# Patient Record
Sex: MALE | Race: WHITE | NOT HISPANIC OR LATINO | URBAN - METROPOLITAN AREA
[De-identification: names, ages, dates, MRNs, and addresses within clinical notes are randomized per-mention and may not be internally consistent; named-entity substitution may affect disease eponyms.]

---

## 2017-01-04 PROBLEM — Z00.00 ENCOUNTER FOR PREVENTIVE HEALTH EXAMINATION: Status: ACTIVE | Noted: 2017-01-04

## 2017-01-14 ENCOUNTER — EMERGENCY (EMERGENCY)
Facility: HOSPITAL | Age: 22
LOS: 0 days | Discharge: HOME | End: 2017-01-14
Admitting: EMERGENCY MEDICINE
Payer: COMMERCIAL

## 2017-01-14 DIAGNOSIS — K92.1 MELENA: ICD-10-CM

## 2017-01-14 DIAGNOSIS — K62.5 HEMORRHAGE OF ANUS AND RECTUM: ICD-10-CM

## 2017-01-14 PROCEDURE — 99284 EMERGENCY DEPT VISIT MOD MDM: CPT

## 2017-02-02 ENCOUNTER — APPOINTMENT (OUTPATIENT)
Dept: PULMONOLOGY | Facility: CLINIC | Age: 22
End: 2017-02-02

## 2017-02-10 ENCOUNTER — INPATIENT (INPATIENT)
Facility: HOSPITAL | Age: 22
LOS: 2 days | Discharge: HOME | End: 2017-02-13
Attending: HOSPITALIST | Admitting: HOSPITALIST
Payer: COMMERCIAL

## 2017-02-10 VITALS — WEIGHT: 139.99 LBS | HEIGHT: 67 IN

## 2017-02-10 DIAGNOSIS — K52.9 NONINFECTIVE GASTROENTERITIS AND COLITIS, UNSPECIFIED: ICD-10-CM

## 2017-02-10 DIAGNOSIS — R10.9 UNSPECIFIED ABDOMINAL PAIN: ICD-10-CM

## 2017-02-10 DIAGNOSIS — R19.7 DIARRHEA, UNSPECIFIED: ICD-10-CM

## 2017-02-10 DIAGNOSIS — K51.211 ULCERATIVE (CHRONIC) PROCTITIS WITH RECTAL BLEEDING: ICD-10-CM

## 2017-02-10 DIAGNOSIS — R10.13 EPIGASTRIC PAIN: ICD-10-CM

## 2017-02-10 DIAGNOSIS — K29.70 GASTRITIS, UNSPECIFIED, WITHOUT BLEEDING: ICD-10-CM

## 2017-02-10 LAB
ALBUMIN SERPL ELPH-MCNC: 3.6 G/DL — SIGNIFICANT CHANGE UP (ref 3.3–5)
ALP SERPL-CCNC: 45 U/L — SIGNIFICANT CHANGE UP (ref 40–120)
ALT FLD-CCNC: 23 U/L — SIGNIFICANT CHANGE UP (ref 12–78)
ANION GAP SERPL CALC-SCNC: 4 MMOL/L — LOW (ref 5–17)
ANION GAP SERPL CALC-SCNC: 5 MMOL/L — SIGNIFICANT CHANGE UP (ref 5–17)
APTT BLD: 25.2 SEC — LOW (ref 27.5–37.4)
AST SERPL-CCNC: 8 U/L — LOW (ref 15–37)
BILIRUB SERPL-MCNC: 0.6 MG/DL — SIGNIFICANT CHANGE UP (ref 0.2–1.2)
BUN SERPL-MCNC: 11 MG/DL — SIGNIFICANT CHANGE UP (ref 7–23)
BUN SERPL-MCNC: 12 MG/DL — SIGNIFICANT CHANGE UP (ref 7–23)
CALCIUM SERPL-MCNC: 7.6 MG/DL — LOW (ref 8.5–10.1)
CALCIUM SERPL-MCNC: 8.6 MG/DL — SIGNIFICANT CHANGE UP (ref 8.5–10.1)
CHLORIDE SERPL-SCNC: 104 MMOL/L — SIGNIFICANT CHANGE UP (ref 96–108)
CHLORIDE SERPL-SCNC: 107 MMOL/L — SIGNIFICANT CHANGE UP (ref 96–108)
CO2 SERPL-SCNC: 30 MMOL/L — SIGNIFICANT CHANGE UP (ref 22–31)
CO2 SERPL-SCNC: 32 MMOL/L — HIGH (ref 22–31)
CREAT SERPL-MCNC: 0.86 MG/DL — SIGNIFICANT CHANGE UP (ref 0.5–1.3)
CREAT SERPL-MCNC: 0.94 MG/DL — SIGNIFICANT CHANGE UP (ref 0.5–1.3)
GLUCOSE SERPL-MCNC: 86 MG/DL — SIGNIFICANT CHANGE UP (ref 70–99)
GLUCOSE SERPL-MCNC: 92 MG/DL — SIGNIFICANT CHANGE UP (ref 70–99)
INR BLD: 1.1 RATIO — SIGNIFICANT CHANGE UP (ref 0.88–1.16)
POTASSIUM SERPL-MCNC: 3.7 MMOL/L — SIGNIFICANT CHANGE UP (ref 3.5–5.3)
POTASSIUM SERPL-MCNC: 3.9 MMOL/L — SIGNIFICANT CHANGE UP (ref 3.5–5.3)
POTASSIUM SERPL-SCNC: 3.7 MMOL/L — SIGNIFICANT CHANGE UP (ref 3.5–5.3)
POTASSIUM SERPL-SCNC: 3.9 MMOL/L — SIGNIFICANT CHANGE UP (ref 3.5–5.3)
PROT SERPL-MCNC: 6.9 GM/DL — SIGNIFICANT CHANGE UP (ref 6–8.3)
PROTHROM AB SERPL-ACNC: 12.1 SEC — SIGNIFICANT CHANGE UP (ref 10–13.1)
SODIUM SERPL-SCNC: 140 MMOL/L — SIGNIFICANT CHANGE UP (ref 135–145)
SODIUM SERPL-SCNC: 142 MMOL/L — SIGNIFICANT CHANGE UP (ref 135–145)

## 2017-02-10 PROCEDURE — 99285 EMERGENCY DEPT VISIT HI MDM: CPT

## 2017-02-10 PROCEDURE — 74177 CT ABD & PELVIS W/CONTRAST: CPT | Mod: 26

## 2017-02-10 RX ORDER — PROCHLORPERAZINE MALEATE 5 MG
10 TABLET ORAL ONCE
Qty: 0 | Refills: 0 | Status: COMPLETED | OUTPATIENT
Start: 2017-02-10 | End: 2017-02-10

## 2017-02-10 RX ORDER — HYDROMORPHONE HYDROCHLORIDE 2 MG/ML
0.5 INJECTION INTRAMUSCULAR; INTRAVENOUS; SUBCUTANEOUS ONCE
Qty: 0 | Refills: 0 | Status: DISCONTINUED | OUTPATIENT
Start: 2017-02-10 | End: 2017-02-10

## 2017-02-10 RX ORDER — DEXTROSE MONOHYDRATE, SODIUM CHLORIDE, AND POTASSIUM CHLORIDE 50; .745; 4.5 G/1000ML; G/1000ML; G/1000ML
1000 INJECTION, SOLUTION INTRAVENOUS
Qty: 0 | Refills: 0 | Status: DISCONTINUED | OUTPATIENT
Start: 2017-02-10 | End: 2017-02-13

## 2017-02-10 RX ORDER — CIPROFLOXACIN LACTATE 400MG/40ML
400 VIAL (ML) INTRAVENOUS EVERY 12 HOURS
Qty: 0 | Refills: 0 | Status: DISCONTINUED | OUTPATIENT
Start: 2017-02-11 | End: 2017-02-13

## 2017-02-10 RX ORDER — CIPROFLOXACIN LACTATE 400MG/40ML
400 VIAL (ML) INTRAVENOUS ONCE
Qty: 0 | Refills: 0 | Status: COMPLETED | OUTPATIENT
Start: 2017-02-10 | End: 2017-02-10

## 2017-02-10 RX ORDER — PANTOPRAZOLE SODIUM 20 MG/1
40 TABLET, DELAYED RELEASE ORAL
Qty: 0 | Refills: 0 | Status: DISCONTINUED | OUTPATIENT
Start: 2017-02-10 | End: 2017-02-13

## 2017-02-10 RX ORDER — MORPHINE SULFATE 50 MG/1
2 CAPSULE, EXTENDED RELEASE ORAL
Qty: 0 | Refills: 0 | Status: DISCONTINUED | OUTPATIENT
Start: 2017-02-10 | End: 2017-02-11

## 2017-02-10 RX ORDER — ONDANSETRON 8 MG/1
4 TABLET, FILM COATED ORAL EVERY 6 HOURS
Qty: 0 | Refills: 0 | Status: DISCONTINUED | OUTPATIENT
Start: 2017-02-10 | End: 2017-02-13

## 2017-02-10 RX ORDER — SODIUM CHLORIDE 9 MG/ML
1000 INJECTION INTRAMUSCULAR; INTRAVENOUS; SUBCUTANEOUS ONCE
Qty: 0 | Refills: 0 | Status: COMPLETED | OUTPATIENT
Start: 2017-02-10 | End: 2017-02-10

## 2017-02-10 RX ORDER — METRONIDAZOLE 500 MG
500 TABLET ORAL EVERY 8 HOURS
Qty: 0 | Refills: 0 | Status: DISCONTINUED | OUTPATIENT
Start: 2017-02-10 | End: 2017-02-13

## 2017-02-10 RX ORDER — ONDANSETRON 8 MG/1
4 TABLET, FILM COATED ORAL ONCE
Qty: 0 | Refills: 0 | Status: COMPLETED | OUTPATIENT
Start: 2017-02-10 | End: 2017-02-10

## 2017-02-10 RX ORDER — CIPROFLOXACIN LACTATE 400MG/40ML
VIAL (ML) INTRAVENOUS
Qty: 0 | Refills: 0 | Status: DISCONTINUED | OUTPATIENT
Start: 2017-02-10 | End: 2017-02-13

## 2017-02-10 RX ORDER — MORPHINE SULFATE 50 MG/1
4 CAPSULE, EXTENDED RELEASE ORAL ONCE
Qty: 0 | Refills: 0 | Status: DISCONTINUED | OUTPATIENT
Start: 2017-02-10 | End: 2017-02-10

## 2017-02-10 RX ADMIN — HYDROMORPHONE HYDROCHLORIDE 0.5 MILLIGRAM(S): 2 INJECTION INTRAMUSCULAR; INTRAVENOUS; SUBCUTANEOUS at 13:59

## 2017-02-10 RX ADMIN — PANTOPRAZOLE SODIUM 40 MILLIGRAM(S): 20 TABLET, DELAYED RELEASE ORAL at 19:23

## 2017-02-10 RX ADMIN — MORPHINE SULFATE 2 MILLIGRAM(S): 50 CAPSULE, EXTENDED RELEASE ORAL at 22:52

## 2017-02-10 RX ADMIN — MORPHINE SULFATE 4 MILLIGRAM(S): 50 CAPSULE, EXTENDED RELEASE ORAL at 12:28

## 2017-02-10 RX ADMIN — Medication 200 MILLIGRAM(S): at 22:31

## 2017-02-10 RX ADMIN — ONDANSETRON 4 MILLIGRAM(S): 8 TABLET, FILM COATED ORAL at 20:47

## 2017-02-10 RX ADMIN — ONDANSETRON 4 MILLIGRAM(S): 8 TABLET, FILM COATED ORAL at 12:29

## 2017-02-10 RX ADMIN — MORPHINE SULFATE 2 MILLIGRAM(S): 50 CAPSULE, EXTENDED RELEASE ORAL at 18:24

## 2017-02-10 RX ADMIN — Medication 10 MILLIGRAM(S): at 13:59

## 2017-02-10 RX ADMIN — MORPHINE SULFATE 2 MILLIGRAM(S): 50 CAPSULE, EXTENDED RELEASE ORAL at 18:40

## 2017-02-10 RX ADMIN — DEXTROSE MONOHYDRATE, SODIUM CHLORIDE, AND POTASSIUM CHLORIDE 75 MILLILITER(S): 50; .745; 4.5 INJECTION, SOLUTION INTRAVENOUS at 22:30

## 2017-02-10 RX ADMIN — MORPHINE SULFATE 4 MILLIGRAM(S): 50 CAPSULE, EXTENDED RELEASE ORAL at 13:59

## 2017-02-10 RX ADMIN — HYDROMORPHONE HYDROCHLORIDE 0.5 MILLIGRAM(S): 2 INJECTION INTRAMUSCULAR; INTRAVENOUS; SUBCUTANEOUS at 14:05

## 2017-02-10 RX ADMIN — SODIUM CHLORIDE 1000 MILLILITER(S): 9 INJECTION INTRAMUSCULAR; INTRAVENOUS; SUBCUTANEOUS at 15:44

## 2017-02-10 RX ADMIN — Medication 20 MILLIGRAM(S): at 15:36

## 2017-02-10 NOTE — CONSULT NOTE ADULT - PROBLEM SELECTOR RECOMMENDATION 2
check repeat cdiff  at increased risk due to colitis and steroids  iv fluids until taking signficant po

## 2017-02-10 NOTE — CONSULT NOTE ADULT - SUBJECTIVE AND OBJECTIVE BOX
Patient is a 21y old  Male who presents with a chief complaint of colitis, rectal bleeding, nausea with vomiting    HPI:  20yo male with hx nonspecific colitis (likely crohn's colitis) with recent flare responding slowly to oral prednisone now tapered to 10mg po bid.  Pt with some persistent rectal bleeding and nausea/vomiting last 2 days.  Also with intermittent crampy abd pain.  Pt with outpt cdiff postive for gdh antigen but negative toxin.      PAST MEDICAL & SURGICAL HISTORY:  colitis diagnosed this year - had responded to uceris in the past and now on prednisone 10 mg bid tapered from 40mg      MEDICATIONS  (STANDING):  see h and p and med rec  MEDICATIONS  (PRN):    SOCIAL HISTORY: occ marijuana, tob, etoh, ex ivdu    FAMILY HISTORY:  noncontributory    REVIEW OF SYSTEMS:    CONSTITUTIONAL: weakness  EYES/ENT: No visual changes;  No vertigo or throat pain   NECK: No pain or stiffness  RESPIRATORY: No cough, wheezing, hemoptysis; No shortness of breath  CARDIOVASCULAR: No chest pain or palpitations  GASTROINTESTINAL: abdominal pain, rectal bleeding  GENITOURINARY: No dysuria, frequency or hematuria  NEUROLOGICAL: No numbness or weakness  SKIN: No itching, burning, rashes, or lesions   PSYCH: Normal mood and affect. feels hyper from steroids  All other review of systems is negative unless indicated above.    Vital Signs Last 24 Hrs  T(C): 37, Max: 37 (02-10 @ 11:33)  T(F): 98.6, Max: 98.6 (02-10 @ 11:33)  HR: 94 (94 - 94)  BP: 121/109 (121/109 - 121/109)  BP(mean): --  RR: 18 (18 - 18)  SpO2: 100% (100% - 100%)    PHYSICAL EXAM:    Constitutional: NAD, well-developed  HEENT: MMM  Neck: No LAD  Respiratory: CTAB  Cardiovascular: S1 and S2, RRR, no M/G/R  Gastrointestinal: BS+, mildly tender lower abdomen  Extremities: No peripheral edema  Vascular: 2+ peripheral pulses  Neurological: A/O x 3, no focal deficits  Psychiatric: Normal mood, normal affect  Skin: No rashes    LABS:                        17.0   21.0  )-----------( 332      ( 10 Feb 2017 12:17 )             49.1     10 Feb 2017 12:17    140    |  104    |  12     ----------------------------<  92     3.9     |  32     |  0.94     Ca    8.6        10 Feb 2017 12:17    TPro  6.9    /  Alb  3.6    /  TBili  0.6    /  DBili  x      /  AST  8      /  ALT  23     /  AlkPhos  45     10 Feb 2017 12:17    PT/INR - ( 10 Feb 2017 12:17 )   PT: 12.1 sec;   INR: 1.10 ratio         PTT - ( 10 Feb 2017 12:17 )  PTT:25.2 sec  LIVER FUNCTIONS - ( 10 Feb 2017 12:17 )  Alb: 3.6 g/dL / Pro: 6.9 gm/dL / ALK PHOS: 45 U/L / ALT: 23 U/L / AST: 8 U/L / GGT: x             RADIOLOGY & ADDITIONAL STUDIES:

## 2017-02-10 NOTE — ED PROVIDER NOTE - OBJECTIVE STATEMENT
22 y/o M with a PMHx of UC in August presents to the ED c/o bright red blood per rectum for the past 2 months with NVD for the past few days with associated abd pain. Pt states that he had 8 episodes of NV this morning, spoke with GI who advised him to visit the ED for an evaluation. Pt currently uncomfortable, anxious and denies hematuria, HA, CP, SOB, fever, chills, cough or any other acute c/o at this time. [facility]. 22 y/o M with a PMHx of UC in August presents to the ED c/o bright red blood per rectum for the past 2 months with NVD for the past few days with associated intermittent abd pain. Pt states that he had 8 episodes of NV this morning, spoke with GI who advised him to visit the ED for an evaluation. Pt currently uncomfortable, anxious and denies hematuria, HA, CP, SOB, fever, chills, cough or any other acute c/o at this time. [facility]. 22 y/o M with a PMHx of UC in August presents to the ED c/o bright red blood per rectum for the past 2 months as well as NV with associated intermittent abd pain for the past few days. Pt states that he had 8 episodes of NV this morning, spoke with GI who advised him to visit the ED for an evaluation. Pt currently uncomfortable, anxious and denies hematuria, HA, CP, SOB, fever, chills, cough or any other acute c/o at this time. GI [facility].

## 2017-02-10 NOTE — H&P ADULT - NSHPPHYSICALEXAM_GEN_ALL_CORE
PHYSICAL EXAM:      Constitutional: comfortable    Eyes: no icterus,    HEENT- PERRLA    Neck:-supple    Back:-normal     Respiratory: air entry equal, no rales    Cardiovascular:S1s2 normal    Gastrointestinal: soft, bs+    Genitourinary: no palpable bladder    Extremities: no swelling noted      Neurological: CN-II-XII    Skin: no rash, ulcer    Lymph Nodes: no enlarged lymph node    Musculoskeletal: power intact in all muscle     Psychiatric: alert, oriented to time, place and persone

## 2017-02-10 NOTE — H&P ADULT - ASSESSMENT
#Abdominal pain with thickened gastric antrum in CT scan   -DDX- gastritis/GE  vs other etiology   -start emperically abx, C diff to follow, Dr. Ferrera evaluation to follow, clear liquid diet for now   -IV ppi    #Chronic colitis- due to ulcerative colitis- ct his dose of steroids     #dvt pr     #Above discussed with pt and all questions have been answered

## 2017-02-10 NOTE — ED PROVIDER NOTE - DETAILS:
I, Felipa Ugarte, performed the initial face to face bedside interview with this patient regarding history of present illness, review of symptoms and relevant past medical, social and family history.  I completed an independent physical examination.  I was the initial provider who evaluated this patient. The history, relevant review of systems, past medical and surgical history, medical decision making, and physical examination was documented by the scribe in my presence and I attest to the accuracy of the documentation.

## 2017-02-10 NOTE — ED STATDOCS - PROGRESS NOTE DETAILS
22 y/o male pmhx colitis presents to ED sent in by GI for work up. Possible c diff. Sent for likely admission. Pt sent to MAIN ER for further eval.

## 2017-02-10 NOTE — ED ADULT NURSE REASSESSMENT NOTE - NS ED NURSE REASSESS COMMENT FT1
pt complains of intermittent nausea, spoke with dr lynn about order for zofran which she will put in. report given to RN on 2S.

## 2017-02-10 NOTE — H&P ADULT - HISTORY OF PRESENT ILLNESS
20 y/o male was relatively alright till few days ago when he noticed pain in abdomen, located in epigastric area, crampy in nature, aggravated by food, relieved by itself, associated with bloody colored bowel movement(which is old for him). Non radiating.   -he has been diagnosed with ulcervative colitis and  was taking steroids prescribed by Dr. Ferrera which he was taking till this incidence happened

## 2017-02-10 NOTE — ED PROVIDER NOTE - MEDICAL DECISION MAKING DETAILS
Pt currently anxious presenting abd pain with plans to receive labs and imaging for further evaluation.

## 2017-02-10 NOTE — CONSULT NOTE ADULT - PROBLEM SELECTOR RECOMMENDATION 9
continue prednisone 10mg po bid for now  will continue taper likely as outpt in few days  no need for iv steroids at this time

## 2017-02-10 NOTE — H&P ADULT - NSHPLABSRESULTS_GEN_ALL_CORE
10 Feb 2017 12:17    140    |  104    |  12     ----------------------------<  92     3.9     |  32     |  0.94     Ca    8.6        10 Feb 2017 12:17    TPro  6.9    /  Alb  3.6    /  TBili  0.6    /  DBili  x      /  AST  8      /  ALT  23     /  AlkPhos  45     10 Feb 2017 12:17                          17.0   21.0  )-----------( 332      ( 10 Feb 2017 12:17 )             49.1

## 2017-02-10 NOTE — ED PROVIDER NOTE - CONSTITUTIONAL, MLM
normal... Ill appearing, well nourished, awake, alert, oriented to person, place, time/situation presenting mild distress. Well appearing, well nourished, awake, alert, oriented to person, place, time/situation presenting mild distress.

## 2017-02-11 LAB
ALBUMIN SERPL ELPH-MCNC: 2.6 G/DL — LOW (ref 3.3–5)
ANION GAP SERPL CALC-SCNC: 7 MMOL/L — SIGNIFICANT CHANGE UP (ref 5–17)
BUN SERPL-MCNC: 9 MG/DL — SIGNIFICANT CHANGE UP (ref 7–23)
CALCIUM SERPL-MCNC: 7.7 MG/DL — LOW (ref 8.5–10.1)
CHLORIDE SERPL-SCNC: 103 MMOL/L — SIGNIFICANT CHANGE UP (ref 96–108)
CO2 SERPL-SCNC: 31 MMOL/L — SIGNIFICANT CHANGE UP (ref 22–31)
CREAT SERPL-MCNC: 1.03 MG/DL — SIGNIFICANT CHANGE UP (ref 0.5–1.3)
GLUCOSE SERPL-MCNC: 108 MG/DL — HIGH (ref 70–99)
HCT VFR BLD CALC: 46.2 % — SIGNIFICANT CHANGE UP (ref 39–50)
HGB BLD-MCNC: 16.4 G/DL — SIGNIFICANT CHANGE UP (ref 13–17)
MAGNESIUM SERPL-MCNC: 1.7 MG/DL — LOW (ref 1.8–2.4)
MCHC RBC-ENTMCNC: 32.7 PG — SIGNIFICANT CHANGE UP (ref 27–34)
MCHC RBC-ENTMCNC: 35.5 GM/DL — SIGNIFICANT CHANGE UP (ref 32–36)
MCV RBC AUTO: 92.2 FL — SIGNIFICANT CHANGE UP (ref 80–100)
PHOSPHATE SERPL-MCNC: 2.5 MG/DL — SIGNIFICANT CHANGE UP (ref 2.5–4.5)
PLATELET # BLD AUTO: 262 K/UL — SIGNIFICANT CHANGE UP (ref 150–400)
POTASSIUM SERPL-MCNC: 3.5 MMOL/L — SIGNIFICANT CHANGE UP (ref 3.5–5.3)
POTASSIUM SERPL-SCNC: 3.5 MMOL/L — SIGNIFICANT CHANGE UP (ref 3.5–5.3)
RBC # BLD: 5.02 M/UL — SIGNIFICANT CHANGE UP (ref 4.2–5.8)
RBC # FLD: 11.1 % — SIGNIFICANT CHANGE UP (ref 10.3–14.5)
SODIUM SERPL-SCNC: 141 MMOL/L — SIGNIFICANT CHANGE UP (ref 135–145)
WBC # BLD: 19.2 K/UL — HIGH (ref 3.8–10.5)
WBC # FLD AUTO: 19.2 K/UL — HIGH (ref 3.8–10.5)

## 2017-02-11 RX ORDER — MORPHINE SULFATE 50 MG/1
2 CAPSULE, EXTENDED RELEASE ORAL
Qty: 0 | Refills: 0 | Status: DISCONTINUED | OUTPATIENT
Start: 2017-02-11 | End: 2017-02-13

## 2017-02-11 RX ORDER — HYDROMORPHONE HYDROCHLORIDE 2 MG/ML
0.5 INJECTION INTRAMUSCULAR; INTRAVENOUS; SUBCUTANEOUS
Qty: 0 | Refills: 0 | Status: DISCONTINUED | OUTPATIENT
Start: 2017-02-11 | End: 2017-02-13

## 2017-02-11 RX ORDER — DIPHENHYDRAMINE HCL 50 MG
12.5 CAPSULE ORAL ONCE
Qty: 0 | Refills: 0 | Status: COMPLETED | OUTPATIENT
Start: 2017-02-11 | End: 2017-02-11

## 2017-02-11 RX ORDER — METOCLOPRAMIDE HCL 10 MG
5 TABLET ORAL ONCE
Qty: 0 | Refills: 0 | Status: COMPLETED | OUTPATIENT
Start: 2017-02-11 | End: 2017-02-11

## 2017-02-11 RX ADMIN — Medication 12.5 MILLIGRAM(S): at 23:35

## 2017-02-11 RX ADMIN — Medication 200 MILLIGRAM(S): at 06:46

## 2017-02-11 RX ADMIN — MORPHINE SULFATE 2 MILLIGRAM(S): 50 CAPSULE, EXTENDED RELEASE ORAL at 11:25

## 2017-02-11 RX ADMIN — MORPHINE SULFATE 2 MILLIGRAM(S): 50 CAPSULE, EXTENDED RELEASE ORAL at 20:19

## 2017-02-11 RX ADMIN — Medication 5 MILLIGRAM(S): at 03:22

## 2017-02-11 RX ADMIN — Medication 200 MILLIGRAM(S): at 18:33

## 2017-02-11 RX ADMIN — Medication 20 MILLIGRAM(S): at 13:14

## 2017-02-11 RX ADMIN — Medication 20 MILLIGRAM(S): at 09:08

## 2017-02-11 RX ADMIN — PANTOPRAZOLE SODIUM 40 MILLIGRAM(S): 20 TABLET, DELAYED RELEASE ORAL at 06:48

## 2017-02-11 RX ADMIN — MORPHINE SULFATE 2 MILLIGRAM(S): 50 CAPSULE, EXTENDED RELEASE ORAL at 21:20

## 2017-02-11 RX ADMIN — ONDANSETRON 4 MILLIGRAM(S): 8 TABLET, FILM COATED ORAL at 10:51

## 2017-02-11 RX ADMIN — DEXTROSE MONOHYDRATE, SODIUM CHLORIDE, AND POTASSIUM CHLORIDE 75 MILLILITER(S): 50; .745; 4.5 INJECTION, SOLUTION INTRAVENOUS at 13:29

## 2017-02-11 RX ADMIN — ONDANSETRON 4 MILLIGRAM(S): 8 TABLET, FILM COATED ORAL at 05:36

## 2017-02-11 RX ADMIN — MORPHINE SULFATE 2 MILLIGRAM(S): 50 CAPSULE, EXTENDED RELEASE ORAL at 02:40

## 2017-02-11 RX ADMIN — PANTOPRAZOLE SODIUM 40 MILLIGRAM(S): 20 TABLET, DELAYED RELEASE ORAL at 19:39

## 2017-02-11 RX ADMIN — MORPHINE SULFATE 2 MILLIGRAM(S): 50 CAPSULE, EXTENDED RELEASE ORAL at 10:51

## 2017-02-11 NOTE — PROGRESS NOTE ADULT - ASSESSMENT
UC  cont tx    gastritis with G antral thickenning  PPI  will consider EGD  advance diet  anti emetics

## 2017-02-11 NOTE — PROGRESS NOTE ADULT - ASSESSMENT
A/P    #Abdominal pain with thickened gastric antrum in CT scan   -DDX- gastritis/GE  vs other etiology   -ct emperically abx, C diff to follow, Dr. Ferrera evaluation appreciated, clear liquid diet for now   -IV ppi to continue   -still having cramp in abdomen     #Chronic colitis- due to ulcerative colitis- in view of his nausea episode change po steroids to iv and monitor him     #dvt pr     #Above discussed with pt and all questions have been answered

## 2017-02-11 NOTE — PROGRESS NOTE ADULT - SUBJECTIVE AND OBJECTIVE BOX
Patient is a 21y old  Male who presents with a chief complaint of abdominal pain, nausea, vomiting, abdominal cramp (10 Feb 2017 17:07)      HPI:  22 y/o male was relatively alright till few days ago when he noticed pain in abdomen, located in epigastric area, crampy in nature, aggravated by food, relieved by itself, associated with bloody colored bowel movement(which is old for him). Non radiating.   -he has been diagnosed with ulcervative colitis and  was taking steroids prescribed by Dr. Cheung which he was taking till this incidence happened (10 Feb 2017 17:07)      n V improved and will try clears  feels that inc steroids helped him  mild upper abd pain, burning, neg radiation  dec diarrhea    PAST MEDICAL & SURGICAL HISTORY:  Ulcerative proctitis with rectal bleeding  No pertinent past medical history  No significant past surgical history  No significant past surgical history      MEDICATIONS  (STANDING):  ciprofloxacin   IVPB  IV Intermittent   metroNIDAZOLE    Tablet 500milliGRAM(s) Oral every 8 hours  dextrose 5% + sodium chloride 0.45% with potassium chloride 20 mEq/L 1000milliLiter(s) IV Continuous <Continuous>  pantoprazole  Injectable 40milliGRAM(s) IV Push two times a day  ciprofloxacin   IVPB 400milliGRAM(s) IV Intermittent every 12 hours  dicyclomine 20milliGRAM(s) Oral three times a day before meals  methylPREDNISolone sodium succinate Injectable 20milliGRAM(s) IV Push every 12 hours    MEDICATIONS  (PRN):  ondansetron Injectable 4milliGRAM(s) IV Push every 6 hours PRN Nausea and/or Vomiting  morphine  - Injectable 2milliGRAM(s) IV Push every 3 hours PRN Moderate Pain (4 - 6)  HYDROmorphone  Injectable 0.5milliGRAM(s) IV Push every 2 hours PRN Severe Pain (7 - 10)      Allergies    No Known Allergies    Intolerances        SOCIAL HISTORY:    FAMILY HISTORY:  No pertinent family history in first degree relatives      REVIEW OF SYSTEMS:    CONSTITUTIONAL: No weakness, fevers or chills  EYES/ENT: No visual changes;  No vertigo or throat pain   NECK: No pain or stiffness  RESPIRATORY: No cough, wheezing, hemoptysis; No shortness of breath  CARDIOVASCULAR: No chest pain or palpitations  GENITOURINARY: No dysuria, frequency or hematuria  NEUROLOGICAL: No numbness or weakness  SKIN: No itching, burning, rashes, or lesions   All other review of systems is negative unless indicated above.    Vital Signs Last 24 Hrs  T(C): 36.6, Max: 36.6 (02-10 @ 20:15)  T(F): 97.9, Max: 97.9 (02-11 @ 08:57)  HR: 97 (86 - 97)  BP: 120/67 (120/67 - 135/56)  BP(mean): --  RR: 18 (16 - 18)  SpO2: 100% (100% - 100%)    PHYSICAL EXAM:    Constitutional: NAD, well-developed  HEENT: EOMI, throat clear  Neck: No LAD, supple  Respiratory: CTA and P  Cardiovascular: S1 and S2, RRR, no M  Gastrointestinal: BS+, soft, NT/ND, neg HSM,  Extremities: No peripheral edema, neg clubing, cyanosis  Vascular: 2+ peripheral pulses  Neurological: A/O x 3, no focal deficits  Psychiatric: Normal mood, normal affect  Skin: No rashes    LABS:  CBC Full  -  ( 11 Feb 2017 11:05 )  WBC Count : 19.2 K/uL  Hemoglobin : 16.4 g/dL  Hematocrit : 46.2 %  Platelet Count - Automated : 262 K/uL  Mean Cell Volume : 92.2 fl  Mean Cell Hemoglobin : 32.7 pg  Mean Cell Hemoglobin Concentration : 35.5 gm/dL  Auto Neutrophil # : x  Auto Lymphocyte # : x  Auto Monocyte # : x  Auto Eosinophil # : x  Auto Basophil # : x  Auto Neutrophil % : x  Auto Lymphocyte % : x  Auto Monocyte % : x  Auto Eosinophil % : x  Auto Basophil % : x    11 Feb 2017 11:05    141    |  103    |  9      ----------------------------<  108    3.5     |  31     |  1.03     Ca    7.7        11 Feb 2017 11:05  Phos  2.5       11 Feb 2017 11:05  Mg     1.7       11 Feb 2017 11:05    TPro  x      /  Alb  2.6    /  TBili  x      /  DBili  x      /  AST  x      /  ALT  x      /  AlkPhos  x      11 Feb 2017 11:05    PT/INR - ( 10 Feb 2017 12:17 )   PT: 12.1 sec;   INR: 1.10 ratio         PTT - ( 10 Feb 2017 12:17 )  PTT:25.2 sec        RADIOLOGY & ADDITIONAL STUDIES:EXAM:  CT ABDOMEN AND PELVIS OC IC                            PROCEDURE DATE:  02/10/2017        INTERPRETATION:      CT Abdomen and Pelvis with IV contrast        Clinical Information:Abdominal pain  Exam Date: 2/10/2017 11:43 AM  Technique: CT ofthe abdomen and pelvis was performed following the   administration of oral and IV contrast          with no prior studies   available for comparison .    FINDINGS:    Visualized lung bases: within normal limits    Liver: within normal limits  Gallbladder: within normal limits.  Bile ducts: No intrahepatic or extrahepatic biliary dilatation  Pancreas: within normal limits    Spleen: within normal limits  Adrenal: within normal limits    Kidneys, Ureters and Bladder:: Kidneys enhance bilaterally and   symmetrically without hydronephrosis. No calculi, or renal masses. The   ureters and bladder are within normal limits.    Pelvis: No pelvic free fluid, mass or adenopathy.    Bowel: Moderate thickening of the gastric antrum with some mucosal wall  enhancement may represent gastritis however neoplastic etiology cannot be   excluded and upper endoscopy is recommended. Subcentimeter adjacent lymph   nodes. No bowel obstruction. Minimal thickening and shagginess to the   colon with prominence ofthe vasa recta particularly the rectosigmoid   colon and descending colon may represent chronic colitis and may be   indicative of a inflammatory bowel disease, clinically correlate. Normal   appendix visualized.    Peritoneum: No intra-abdominal free air, ascites or organized fluid   collections.    Retroperitoneum:     Subcentimeter retroperitoneal lymph nodes   non-specific in nature   Vessels:     Within normal limits.    Abdominal wall:  within normal limits  Bones:  Within normal limits.    IMPRESSION:    Moderate thickening of the gastric antrum with submucosal wall   enhancement compatible with gastritis, upper endoscopy is recommended.     Minimal thickening and shagginess to the colon with prominence of the   vasa recta particularlythe rectosigmoid colon and descending colon may   represent chronic colitis and may be indicative of a inflammatory bowel   disease, clinically correlate.     No bowel obstruction. Normal appendix visualized.              VITOR LANDIN M.D., ATTENDING RADIOLOGIST  This document has been electronically signed. Feb 10 2017  2:54PM

## 2017-02-11 NOTE — PROGRESS NOTE ADULT - SUBJECTIVE AND OBJECTIVE BOX
Patient is a 21y old  Male who presents with a chief complaint of abdominal pain, nausea, vomiting, abdominal cramp (10 Feb 2017 17:07)        HPI:  20 y/o male was relatively alright till few days ago when he noticed pain in abdomen, located in epigastric area, crampy in nature, aggravated by food, relieved by itself, associated with bloody colored bowel movement(which is old for him). Non radiating.   -he has been diagnosed with ulcervative colitis and  was taking steroids prescribed by Dr. Ferrera which he was taking till this incidence happened (10 Feb 2017 17:07)        PHYSICAL EXAM:  Vital Signs Last 24 Hrs  T(C): 36.6, Max: 37 (02-10 @ 11:33)  T(F): 97.8, Max: 98.6 (02-10 @ 11:33)  HR: 86 (78 - 94)  BP: 135/56 (115/74 - 135/56)  BP(mean): --  RR: 16 (16 - 18)  SpO2: 100% (99% - 100%)  GENERAL: comfortable   HEAD:  Atraumatic, Normocephalic  EYES: EOMI, PERRLA, conjunctiva and sclera clear  HEENT: Moist mucous membranes  NECK: Supple, No JVD  NERVOUS SYSTEM:  Alert & Oriented X3, Motor Strength 5/5 B/L upper and lower extremities; DTRs 2+ intact and symmetric  CHEST/LUNG: Clear to auscultation bilaterally; No rales, rhonchi, wheezing, or rubs  HEART: Regular rate and rhythm; No murmurs, rubs, or gallops  ABDOMEN: Soft, Nontender, Nondistended; Bowel sounds present  GENITOURINARY- Voiding, no palpable bladder  EXTREMITIES:  2+ Peripheral Pulses, No clubbing, cyanosis, or edema  MUSCULOSKELTAL- No muscle tenderness, Muscle tone normal, No joint tenderness, no Joint swelling, Joint range of motion-normal  SKIN-no rash, no lesion

## 2017-02-12 LAB
ALBUMIN SERPL ELPH-MCNC: 2.5 G/DL — LOW (ref 3.3–5)
ANION GAP SERPL CALC-SCNC: 8 MMOL/L — SIGNIFICANT CHANGE UP (ref 5–17)
BUN SERPL-MCNC: 7 MG/DL — SIGNIFICANT CHANGE UP (ref 7–23)
C DIFF BY PCR RESULT: SIGNIFICANT CHANGE UP
C DIFF TOX GENS STL QL NAA+PROBE: SIGNIFICANT CHANGE UP
CALCIUM SERPL-MCNC: 8 MG/DL — LOW (ref 8.5–10.1)
CHLORIDE SERPL-SCNC: 104 MMOL/L — SIGNIFICANT CHANGE UP (ref 96–108)
CO2 SERPL-SCNC: 30 MMOL/L — SIGNIFICANT CHANGE UP (ref 22–31)
CREAT SERPL-MCNC: 0.91 MG/DL — SIGNIFICANT CHANGE UP (ref 0.5–1.3)
GLUCOSE SERPL-MCNC: 110 MG/DL — HIGH (ref 70–99)
HCT VFR BLD CALC: 43.8 % — SIGNIFICANT CHANGE UP (ref 39–50)
HGB BLD-MCNC: 15.4 G/DL — SIGNIFICANT CHANGE UP (ref 13–17)
MAGNESIUM SERPL-MCNC: 2 MG/DL — SIGNIFICANT CHANGE UP (ref 1.8–2.4)
MCHC RBC-ENTMCNC: 32.7 PG — SIGNIFICANT CHANGE UP (ref 27–34)
MCHC RBC-ENTMCNC: 35.2 GM/DL — SIGNIFICANT CHANGE UP (ref 32–36)
MCV RBC AUTO: 93 FL — SIGNIFICANT CHANGE UP (ref 80–100)
PHOSPHATE SERPL-MCNC: 3.5 MG/DL — SIGNIFICANT CHANGE UP (ref 2.5–4.5)
PLATELET # BLD AUTO: 275 K/UL — SIGNIFICANT CHANGE UP (ref 150–400)
POTASSIUM SERPL-MCNC: 3.6 MMOL/L — SIGNIFICANT CHANGE UP (ref 3.5–5.3)
POTASSIUM SERPL-SCNC: 3.6 MMOL/L — SIGNIFICANT CHANGE UP (ref 3.5–5.3)
RBC # BLD: 4.7 M/UL — SIGNIFICANT CHANGE UP (ref 4.2–5.8)
RBC # FLD: 11.2 % — SIGNIFICANT CHANGE UP (ref 10.3–14.5)
SODIUM SERPL-SCNC: 142 MMOL/L — SIGNIFICANT CHANGE UP (ref 135–145)
WBC # BLD: 18.1 K/UL — HIGH (ref 3.8–10.5)
WBC # FLD AUTO: 18.1 K/UL — HIGH (ref 3.8–10.5)

## 2017-02-12 RX ORDER — ALPRAZOLAM 0.25 MG
0.25 TABLET ORAL THREE TIMES A DAY
Qty: 0 | Refills: 0 | Status: DISCONTINUED | OUTPATIENT
Start: 2017-02-12 | End: 2017-02-13

## 2017-02-12 RX ADMIN — ONDANSETRON 4 MILLIGRAM(S): 8 TABLET, FILM COATED ORAL at 14:27

## 2017-02-12 RX ADMIN — Medication 20 MILLIGRAM(S): at 05:53

## 2017-02-12 RX ADMIN — DEXTROSE MONOHYDRATE, SODIUM CHLORIDE, AND POTASSIUM CHLORIDE 75 MILLILITER(S): 50; .745; 4.5 INJECTION, SOLUTION INTRAVENOUS at 18:17

## 2017-02-12 RX ADMIN — MORPHINE SULFATE 2 MILLIGRAM(S): 50 CAPSULE, EXTENDED RELEASE ORAL at 02:30

## 2017-02-12 RX ADMIN — Medication 200 MILLIGRAM(S): at 05:52

## 2017-02-12 RX ADMIN — Medication 0.5 MILLIGRAM(S): at 21:54

## 2017-02-12 RX ADMIN — PANTOPRAZOLE SODIUM 40 MILLIGRAM(S): 20 TABLET, DELAYED RELEASE ORAL at 18:18

## 2017-02-12 RX ADMIN — MORPHINE SULFATE 2 MILLIGRAM(S): 50 CAPSULE, EXTENDED RELEASE ORAL at 01:29

## 2017-02-12 RX ADMIN — Medication 200 MILLIGRAM(S): at 18:17

## 2017-02-12 RX ADMIN — PANTOPRAZOLE SODIUM 40 MILLIGRAM(S): 20 TABLET, DELAYED RELEASE ORAL at 05:53

## 2017-02-12 RX ADMIN — Medication 20 MILLIGRAM(S): at 18:26

## 2017-02-12 NOTE — PROGRESS NOTE ADULT - SUBJECTIVE AND OBJECTIVE BOX
Patient is a 21y old  Male who presents with a chief complaint of abdominal pain, nausea, vomiting, abdominal cramp (10 Feb 2017 17:07)        HPI:  22 y/o male was relatively alright till few days ago when he noticed pain in abdomen, located in epigastric area, crampy in nature, aggravated by food, relieved by itself, associated with bloody colored bowel movement(which is old for him). Non radiating.   -he has been diagnosed with ulcervative colitis and  was taking steroids prescribed by Dr. Ferrera which he was taking till this incidence happened (10 Feb 2017 17:07)        PHYSICAL EXAM:    Vital Signs Last 24 Hrs  T(C): 36.3, Max: 36.4 (02-11 @ 20:34)  T(F): 97.4, Max: 97.6 (02-11 @ 20:34)  HR: 79 (79 - 80)  BP: 128/47 (109/52 - 128/47)  BP(mean): --  RR: 18 (18 - 18)  SpO2: 96% (96% - 100%)    HEAD:  Atraumatic, Normocephalic  EYES: EOMI, PERRLA, conjunctiva and sclera clear  HEENT: Moist mucous membranes  NECK: Supple, No JVD  NERVOUS SYSTEM:  Alert & Oriented X3, Motor Strength 5/5 B/L upper and lower extremities; DTRs 2+ intact and symmetric  CHEST/LUNG: Clear to auscultation bilaterally; No rales, rhonchi, wheezing, or rubs  HEART: Regular rate and rhythm; No murmurs, rubs, or gallops  ABDOMEN: Soft, Nontender, Nondistended; Bowel sounds present  GENITOURINARY- Voiding, no palpable bladder  EXTREMITIES:  2+ Peripheral Pulses, No clubbing, cyanosis, or edema  MUSCULOSKELETAL No muscle tenderness, Muscle tone normal, No joint tenderness, no Joint swelling, Joint range of motion-normal  SKIN-no rash, no lesion

## 2017-02-12 NOTE — PROGRESS NOTE ADULT - ASSESSMENT
A/P    #Abdominal pain with thickened gastric antrum in CT scan   -DDX- gastritis/GE  vs other etiology   -ct emperically abx, C diff negative, Dr. Ferrera evaluation appreciated,  -IV ppi to continue   -better today   -advance diet to thick liquid today     #Chronic colitis- due to ulcerative colitis- ct same rx      #dvt pr     #Above discussed with pt and all questions have been answered

## 2017-02-13 VITALS — RESPIRATION RATE: 17 BRPM | HEART RATE: 90 BPM | SYSTOLIC BLOOD PRESSURE: 143 MMHG | TEMPERATURE: 97 F

## 2017-02-13 LAB
CULTURE RESULTS: SIGNIFICANT CHANGE UP
SPECIMEN SOURCE: SIGNIFICANT CHANGE UP

## 2017-02-13 RX ORDER — PANTOPRAZOLE SODIUM 20 MG/1
1 TABLET, DELAYED RELEASE ORAL
Qty: 7 | Refills: 0 | OUTPATIENT
Start: 2017-02-13 | End: 2017-02-20

## 2017-02-13 RX ADMIN — MORPHINE SULFATE 2 MILLIGRAM(S): 50 CAPSULE, EXTENDED RELEASE ORAL at 04:35

## 2017-02-13 RX ADMIN — Medication 0.5 MILLIGRAM(S): at 09:00

## 2017-02-13 RX ADMIN — Medication 20 MILLIGRAM(S): at 16:56

## 2017-02-13 RX ADMIN — PANTOPRAZOLE SODIUM 40 MILLIGRAM(S): 20 TABLET, DELAYED RELEASE ORAL at 05:45

## 2017-02-13 RX ADMIN — MORPHINE SULFATE 2 MILLIGRAM(S): 50 CAPSULE, EXTENDED RELEASE ORAL at 00:04

## 2017-02-13 RX ADMIN — PANTOPRAZOLE SODIUM 40 MILLIGRAM(S): 20 TABLET, DELAYED RELEASE ORAL at 17:05

## 2017-02-13 RX ADMIN — Medication 20 MILLIGRAM(S): at 05:45

## 2017-02-13 RX ADMIN — Medication 200 MILLIGRAM(S): at 05:46

## 2017-02-13 RX ADMIN — MORPHINE SULFATE 2 MILLIGRAM(S): 50 CAPSULE, EXTENDED RELEASE ORAL at 13:00

## 2017-02-13 RX ADMIN — MORPHINE SULFATE 2 MILLIGRAM(S): 50 CAPSULE, EXTENDED RELEASE ORAL at 12:16

## 2017-02-13 NOTE — DISCHARGE NOTE ADULT - CARE PLAN
Principal Discharge DX:	Abdominal pain  Goal:	take your medicine, follow up in gi office tomorrow for further managemente  Instructions for follow-up, activity and diet:	as above

## 2017-02-13 NOTE — DISCHARGE NOTE ADULT - CARE PROVIDER_API CALL
Sidney Cheung), Gastroenterology; Internal Medicine  [address] [place] [address]  Phone: [phone]  Fax: [phone]

## 2017-02-13 NOTE — DISCHARGE NOTE ADULT - NS MD DC FALL RISK RISK
For information on Fall & Injury Prevention, visit [email or web address]

## 2017-02-13 NOTE — DISCHARGE NOTE ADULT - MEDICATION SUMMARY - MEDICATIONS TO TAKE
I will START or STAY ON the medications listed below when I get home from the hospital:    predniSONE 20 mg oral tablet  -- 20 milligram(s) by mouth 2 times a day  -- Indication: For Colitis    pantoprazole 40 mg oral granule, enteric coated  -- 1 each by mouth every 24 hours  -- It is very important that you take or use this exactly as directed.  Do not skip doses or discontinue unless directed by your doctor.  Obtain medical advice before taking any non-prescription drugs as some may affect the action of this medication.    -- Indication: For Gastritis

## 2017-02-13 NOTE — PROGRESS NOTE ADULT - ASSESSMENT
22 yo man with UC flare, clinically improved    -Continue PPI for gastritis. Can discuss EGD as outpt with Dr. Cheung  -Can DC home on prednisone 40 mg. Can taper as outpt  -Low residue diet  -Doesn't need to go home on abx.  -F/U with Dr. Cheung this week [phone]

## 2017-02-13 NOTE — PROGRESS NOTE ADULT - SUBJECTIVE AND OBJECTIVE BOX
HPI:  20 yo man with UC was admitted flare. Pain has resolved. BMs more formed. Minimal blood. Patient wants to advance diet and go home.      MEDICATIONS  (STANDING):  ciprofloxacin   IVPB  IV Intermittent   metroNIDAZOLE    Tablet 500milliGRAM(s) Oral every 8 hours  dextrose 5% + sodium chloride 0.45% with potassium chloride 20 mEq/L 1000milliLiter(s) IV Continuous <Continuous>  pantoprazole  Injectable 40milliGRAM(s) IV Push two times a day  ciprofloxacin   IVPB 400milliGRAM(s) IV Intermittent every 12 hours  dicyclomine 20milliGRAM(s) Oral three times a day before meals  methylPREDNISolone sodium succinate Injectable 20milliGRAM(s) IV Push every 12 hours    MEDICATIONS  (PRN):  ondansetron Injectable 4milliGRAM(s) IV Push every 6 hours PRN Nausea and/or Vomiting  morphine  - Injectable 2milliGRAM(s) IV Push every 3 hours PRN Moderate Pain (4 - 6)  HYDROmorphone  Injectable 0.5milliGRAM(s) IV Push every 2 hours PRN Severe Pain (7 - 10)  ALPRAZolam 0.25milliGRAM(s) Oral three times a day PRN anxiety      Allergies    No Known Allergies    Intolerances        REVIEW OF SYSTEMS    General: no unexpected weight loss    HEENT: no icterus    Respiratory and Thorax: no SOB  	  Cardiovascular: no CP    Gastrointestinal: as above    Skin: no jaundice      Vital Signs Last 24 Hrs  T(C): 36.4, Max: 36.6 (02-12 @ 15:17)  T(F): 97.6, Max: 97.9 (02-12 @ 15:17)  HR: 87 (84 - 87)  BP: 136/65 (113/47 - 136/65)  BP(mean): --  RR: 100 (17 - 100)  SpO2: 99% (99% - 99%)    PHYSICAL EXAM:    Constitutional: NAD    HEENT: anicteric    Respiratory: CTA BL    Cardiovascular:  RRR    Gastrointestinal: soft ND +BS NTTP    Extremities: no LE edema    Neuro: no focal deficits    Skin: no jaundice      LABS:                        15.4   18.1  )-----------( 275      ( 12 Feb 2017 07:03 )             43.8     12 Feb 2017 07:03    142    |  104    |  7      ----------------------------<  110    3.6     |  30     |  0.91     Ca    8.0        12 Feb 2017 07:03  Phos  3.5       12 Feb 2017 07:03  Mg     2.0       12 Feb 2017 07:03    TPro  x      /  Alb  2.5    /  TBili  x      /  DBili  x      /  AST  x      /  ALT  x      /  AlkPhos  x      12 Feb 2017 07:03      LIVER FUNCTIONS - ( 12 Feb 2017 07:03 )  Alb: 2.5 g/dL / Pro: x     / ALK PHOS: x     / ALT: x     / AST: x     / GGT: x             RADIOLOGY & ADDITIONAL STUDIES:

## 2017-02-13 NOTE — PROGRESS NOTE ADULT - SUBJECTIVE AND OBJECTIVE BOX
Patient is a 21y old  Male who presents with a chief complaint of abdominal pain, nausea, vomiting, abdominal cramp (10 Feb 2017 17:07)        HPI:  22 y/o male was relatively alright till few days ago when he noticed pain in abdomen, located in epigastric area, crampy in nature, aggravated by food, relieved by itself, associated with bloody colored bowel movement(which is old for him). Non radiating.   -he has been diagnosed with ulcerative colitis and  was taking steroids prescribed by Dr. Ferrera which he was taking till this incidence happened (10 Feb 2017 17:07)        PHYSICAL EXAM:                          15.4   18.1  )-----------( 275      ( 12 Feb 2017 07:03 )             43.8     12 Feb 2017 07:03    142    |  104    |  7      ----------------------------<  110    3.6     |  30     |  0.91     Ca    8.0        12 Feb 2017 07:03  Phos  3.5       12 Feb 2017 07:03  Mg     2.0       12 Feb 2017 07:03    TPro  x      /  Alb  2.5    /  TBili  x      /  DBili  x      /  AST  x      /  ALT  x      /  AlkPhos  x      12 Feb 2017 07:03    HEAD:  Atraumatic, Normocephalic  EYES: EOMI, PERRLA, conjunctiva and sclera clear  HEENT: Moist mucous membranes  NECK: Supple, No JVD  NERVOUS SYSTEM:  Alert & Oriented X3, Motor Strength 5/5 B/L upper and lower extremities; DTRs 2+ intact and symmetric  CHEST/LUNG: Clear to auscultation bilaterally; No rales, rhonchi, wheezing, or rubs  HEART: Regular rate and rhythm; No murmurs, rubs, or gallops  ABDOMEN: Soft, Nontender, Nondistended; Bowel sounds present  GENITOURINARY- Voiding, no palpable bladder  EXTREMITIES:  2+ Peripheral Pulses, No clubbing, cyanosis, or edema  MUSCULOSKELETAL No muscle tenderness, Muscle tone normal, No joint tenderness, no Joint swelling, Joint range of motion-normal  SKIN-no rash, no lesion      Assessment and Plan:       #Abdominal pain with thickened gastric antrum in CT scan   -possible gastritis with possible infectious colitis   -improved and feeling much better, tolerating regular diet       #Chronic colitis- due to ulcerative colitis- resume home meds       # GI follow up appreciated     #D/C today with further management as an outpt     #time spent more than 30 minutes

## 2017-02-13 NOTE — DISCHARGE NOTE ADULT - PATIENT PORTAL LINK FT
“You can access the [facility] Patient Portal, offered by [facility], by registering with the following website: [email or web address]”

## 2017-02-13 NOTE — DISCHARGE NOTE ADULT - MEDICATION SUMMARY - MEDICATIONS TO CHANGE
I will SWITCH the dose or number of times a day I take the medications listed below when I get home from the hospital:    Librax 5 mg-2.5 mg oral capsule  -- 2 cap(s) by mouth 4 times a day (before meals and at bedtime)    predniSONE 20 mg oral tablet  --  by mouth

## 2017-02-13 NOTE — DISCHARGE NOTE ADULT - HOSPITAL COURSE
pt admitted for abdominal pain and diarreha. Etiology appears to have gastritis and ulcerative colitis. He was given steroids, abx and ppi . With these measure pt has improved and currently feeling much better and he is being discharged with further management as an outpt    #time spent more than 30 minutes

## 2017-02-15 DIAGNOSIS — R10.9 UNSPECIFIED ABDOMINAL PAIN: ICD-10-CM

## 2017-02-15 DIAGNOSIS — K51.90 ULCERATIVE COLITIS, UNSPECIFIED, WITHOUT COMPLICATIONS: ICD-10-CM

## 2017-02-15 DIAGNOSIS — K29.70 GASTRITIS, UNSPECIFIED, WITHOUT BLEEDING: ICD-10-CM

## 2017-02-16 DIAGNOSIS — K51.818 OTHER ULCERATIVE COLITIS WITH OTHER COMPLICATION: ICD-10-CM

## 2017-02-16 LAB
CULTURE RESULTS: SIGNIFICANT CHANGE UP
CULTURE RESULTS: SIGNIFICANT CHANGE UP
SPECIMEN SOURCE: SIGNIFICANT CHANGE UP
SPECIMEN SOURCE: SIGNIFICANT CHANGE UP

## 2017-05-26 ENCOUNTER — RESULT REVIEW (OUTPATIENT)
Age: 22
End: 2017-05-26

## 2017-05-29 ENCOUNTER — INPATIENT (INPATIENT)
Facility: HOSPITAL | Age: 22
LOS: 0 days | Discharge: HOME | End: 2017-05-30
Attending: STUDENT IN AN ORGANIZED HEALTH CARE EDUCATION/TRAINING PROGRAM | Admitting: HOSPITALIST
Payer: COMMERCIAL

## 2017-05-29 VITALS — HEIGHT: 67 IN | WEIGHT: 126.99 LBS

## 2017-05-29 DIAGNOSIS — K51.90 ULCERATIVE COLITIS, UNSPECIFIED, WITHOUT COMPLICATIONS: ICD-10-CM

## 2017-05-29 PROBLEM — K51.211 ULCERATIVE (CHRONIC) PROCTITIS WITH RECTAL BLEEDING: Chronic | Status: ACTIVE | Noted: 2017-02-10

## 2017-05-29 LAB
ADD ON TEST-SPECIMEN IN LAB: SIGNIFICANT CHANGE UP
ALBUMIN SERPL ELPH-MCNC: 3.7 G/DL — SIGNIFICANT CHANGE UP (ref 3.3–5)
ALP SERPL-CCNC: 45 U/L — SIGNIFICANT CHANGE UP (ref 40–120)
ALT FLD-CCNC: 17 U/L — SIGNIFICANT CHANGE UP (ref 12–78)
ANION GAP SERPL CALC-SCNC: 7 MMOL/L — SIGNIFICANT CHANGE UP (ref 5–17)
AST SERPL-CCNC: 9 U/L — LOW (ref 15–37)
BASOPHILS # BLD AUTO: 0.1 K/UL — SIGNIFICANT CHANGE UP (ref 0–0.2)
BASOPHILS NFR BLD AUTO: 0.4 % — SIGNIFICANT CHANGE UP (ref 0–2)
BILIRUB SERPL-MCNC: 0.4 MG/DL — SIGNIFICANT CHANGE UP (ref 0.2–1.2)
BUN SERPL-MCNC: 11 MG/DL — SIGNIFICANT CHANGE UP (ref 7–23)
CALCIUM SERPL-MCNC: 8.8 MG/DL — SIGNIFICANT CHANGE UP (ref 8.5–10.1)
CHLORIDE SERPL-SCNC: 105 MMOL/L — SIGNIFICANT CHANGE UP (ref 96–108)
CO2 SERPL-SCNC: 28 MMOL/L — SIGNIFICANT CHANGE UP (ref 22–31)
CREAT SERPL-MCNC: 0.91 MG/DL — SIGNIFICANT CHANGE UP (ref 0.5–1.3)
EOSINOPHIL # BLD AUTO: 0.2 K/UL — SIGNIFICANT CHANGE UP (ref 0–0.5)
EOSINOPHIL NFR BLD AUTO: 1.4 % — SIGNIFICANT CHANGE UP (ref 0–6)
GLUCOSE SERPL-MCNC: 73 MG/DL — SIGNIFICANT CHANGE UP (ref 70–99)
HCT VFR BLD CALC: 42 % — SIGNIFICANT CHANGE UP (ref 39–50)
HGB BLD-MCNC: 14.1 G/DL — SIGNIFICANT CHANGE UP (ref 13–17)
LIDOCAIN IGE QN: 109 U/L — SIGNIFICANT CHANGE UP (ref 73–393)
LYMPHOCYTES # BLD AUTO: 25.6 % — SIGNIFICANT CHANGE UP (ref 13–44)
LYMPHOCYTES # BLD AUTO: 3.5 K/UL — HIGH (ref 1–3.3)
MAGNESIUM SERPL-MCNC: 2.3 MG/DL — SIGNIFICANT CHANGE UP (ref 1.6–2.6)
MCHC RBC-ENTMCNC: 28.3 PG — SIGNIFICANT CHANGE UP (ref 27–34)
MCHC RBC-ENTMCNC: 33.7 GM/DL — SIGNIFICANT CHANGE UP (ref 32–36)
MCV RBC AUTO: 84 FL — SIGNIFICANT CHANGE UP (ref 80–100)
MONOCYTES # BLD AUTO: 1.3 K/UL — HIGH (ref 0–0.9)
MONOCYTES NFR BLD AUTO: 9.6 % — SIGNIFICANT CHANGE UP (ref 2–14)
NEUTROPHILS # BLD AUTO: 8.7 K/UL — HIGH (ref 1.8–7.4)
NEUTROPHILS NFR BLD AUTO: 63 % — SIGNIFICANT CHANGE UP (ref 43–77)
PHOSPHATE SERPL-MCNC: 3.3 MG/DL — SIGNIFICANT CHANGE UP (ref 2.5–4.5)
PLATELET # BLD AUTO: 420 K/UL — HIGH (ref 150–400)
POTASSIUM SERPL-MCNC: 3.1 MMOL/L — LOW (ref 3.5–5.3)
POTASSIUM SERPL-SCNC: 3.1 MMOL/L — LOW (ref 3.5–5.3)
PROT SERPL-MCNC: 7.1 GM/DL — SIGNIFICANT CHANGE UP (ref 6–8.3)
RBC # BLD: 5 M/UL — SIGNIFICANT CHANGE UP (ref 4.2–5.8)
RBC # FLD: 12.3 % — SIGNIFICANT CHANGE UP (ref 10.3–14.5)
SODIUM SERPL-SCNC: 140 MMOL/L — SIGNIFICANT CHANGE UP (ref 135–145)
WBC # BLD: 13.9 K/UL — HIGH (ref 3.8–10.5)
WBC # FLD AUTO: 13.9 K/UL — HIGH (ref 3.8–10.5)

## 2017-05-29 PROCEDURE — 99285 EMERGENCY DEPT VISIT HI MDM: CPT

## 2017-05-29 RX ORDER — ONDANSETRON 8 MG/1
4 TABLET, FILM COATED ORAL ONCE
Qty: 0 | Refills: 0 | Status: COMPLETED | OUTPATIENT
Start: 2017-05-29 | End: 2017-05-29

## 2017-05-29 RX ORDER — MORPHINE SULFATE 50 MG/1
4 CAPSULE, EXTENDED RELEASE ORAL EVERY 4 HOURS
Qty: 0 | Refills: 0 | Status: DISCONTINUED | OUTPATIENT
Start: 2017-05-29 | End: 2017-05-30

## 2017-05-29 RX ORDER — ACETAMINOPHEN 500 MG
650 TABLET ORAL ONCE
Qty: 0 | Refills: 0 | Status: COMPLETED | OUTPATIENT
Start: 2017-05-29 | End: 2017-05-29

## 2017-05-29 RX ORDER — POTASSIUM CHLORIDE 20 MEQ
40 PACKET (EA) ORAL ONCE
Qty: 0 | Refills: 0 | Status: COMPLETED | OUTPATIENT
Start: 2017-05-29 | End: 2017-05-29

## 2017-05-29 RX ORDER — DIAZEPAM 5 MG
2 TABLET ORAL AT BEDTIME
Qty: 0 | Refills: 0 | Status: DISCONTINUED | OUTPATIENT
Start: 2017-05-29 | End: 2017-05-30

## 2017-05-29 RX ORDER — SODIUM CHLORIDE 9 MG/ML
1000 INJECTION INTRAMUSCULAR; INTRAVENOUS; SUBCUTANEOUS ONCE
Qty: 0 | Refills: 0 | Status: COMPLETED | OUTPATIENT
Start: 2017-05-29 | End: 2017-05-29

## 2017-05-29 RX ORDER — PANTOPRAZOLE SODIUM 20 MG/1
40 TABLET, DELAYED RELEASE ORAL
Qty: 0 | Refills: 0 | Status: DISCONTINUED | OUTPATIENT
Start: 2017-05-29 | End: 2017-05-30

## 2017-05-29 RX ORDER — MORPHINE SULFATE 50 MG/1
4 CAPSULE, EXTENDED RELEASE ORAL ONCE
Qty: 0 | Refills: 0 | Status: DISCONTINUED | OUTPATIENT
Start: 2017-05-29 | End: 2017-05-29

## 2017-05-29 RX ORDER — INFLIXIMAB-DYYB 120 MG/ML
290 INJECTION SUBCUTANEOUS ONCE
Qty: 0 | Refills: 0 | Status: DISCONTINUED | OUTPATIENT
Start: 2017-05-29 | End: 2017-05-30

## 2017-05-29 RX ORDER — SODIUM CHLORIDE 9 MG/ML
1000 INJECTION INTRAMUSCULAR; INTRAVENOUS; SUBCUTANEOUS
Qty: 0 | Refills: 0 | Status: DISCONTINUED | OUTPATIENT
Start: 2017-05-29 | End: 2017-05-30

## 2017-05-29 RX ORDER — DIPHENHYDRAMINE HCL 50 MG
25 CAPSULE ORAL ONCE
Qty: 0 | Refills: 0 | Status: COMPLETED | OUTPATIENT
Start: 2017-05-29 | End: 2017-05-29

## 2017-05-29 RX ADMIN — Medication 20 MILLIGRAM(S): at 14:41

## 2017-05-29 RX ADMIN — MORPHINE SULFATE 4 MILLIGRAM(S): 50 CAPSULE, EXTENDED RELEASE ORAL at 08:18

## 2017-05-29 RX ADMIN — Medication 40 MILLIEQUIVALENT(S): at 16:00

## 2017-05-29 RX ADMIN — Medication 20 MILLIGRAM(S): at 23:14

## 2017-05-29 RX ADMIN — Medication 0.25 MILLIGRAM(S): at 23:14

## 2017-05-29 RX ADMIN — Medication 20 MILLIGRAM(S): at 09:49

## 2017-05-29 RX ADMIN — SODIUM CHLORIDE 3000 MILLILITER(S): 9 INJECTION INTRAMUSCULAR; INTRAVENOUS; SUBCUTANEOUS at 09:49

## 2017-05-29 RX ADMIN — MORPHINE SULFATE 4 MILLIGRAM(S): 50 CAPSULE, EXTENDED RELEASE ORAL at 14:28

## 2017-05-29 RX ADMIN — MORPHINE SULFATE 4 MILLIGRAM(S): 50 CAPSULE, EXTENDED RELEASE ORAL at 17:15

## 2017-05-29 RX ADMIN — MORPHINE SULFATE 4 MILLIGRAM(S): 50 CAPSULE, EXTENDED RELEASE ORAL at 14:30

## 2017-05-29 RX ADMIN — SODIUM CHLORIDE 75 MILLILITER(S): 9 INJECTION INTRAMUSCULAR; INTRAVENOUS; SUBCUTANEOUS at 21:48

## 2017-05-29 RX ADMIN — ONDANSETRON 4 MILLIGRAM(S): 8 TABLET, FILM COATED ORAL at 09:49

## 2017-05-29 RX ADMIN — MORPHINE SULFATE 4 MILLIGRAM(S): 50 CAPSULE, EXTENDED RELEASE ORAL at 08:33

## 2017-05-29 NOTE — H&P ADULT - NSHPPHYSICALEXAM_GEN_ALL_CORE
PHYSICAL EXAM:    Constitutional: NAD, awake and alert, well-developed  HEENT: PERR, EOMI, Normal Hearing, MMM  Neck: Soft and supple, No LAD, No JVD  Respiratory: Breath sounds are clear bilaterally, No wheezing, rales or rhonchi  Cardiovascular: S1 and S2, regular rate and rhythm, no Murmurs, gallops or rubs  Gastrointestinal: Bowel sounds present, no rebounding or guarding.   Extremities: No peripheral edema  Vascular: 2+ peripheral pulses  Neurological: A/O x 3, no focal deficits

## 2017-05-29 NOTE — ED PROVIDER NOTE - PROGRESS NOTE DETAILS
Gayathri Mcconnell, on behalf of Attending, Dr. Park, Case discussed with Dr. Ferrera(GI) recommends admission for IV steroids and pain management as needed. Will order Remicade as requested.

## 2017-05-29 NOTE — H&P ADULT - NSHPREVIEWOFSYSTEMS_GEN_ALL_CORE
REVIEW OF SYSTEMS:    CONSTITUTIONAL: No weakness, fevers or chills  EYES/ENT: No visual changes;  No vertigo or throat pain   NECK: No pain or stiffness  RESPIRATORY: No cough, wheezing, hemoptysis; No shortness of breath  CARDIOVASCULAR: No chest pain or palpitations  GASTROINTESTINAL: abd pain, rectal pain, + hematochezia  GENITOURINARY: No dysuria, frequency or hematuria  NEUROLOGICAL: No numbness or weakness  SKIN: No itching, burning, rashes, or lesions   All other review of systems is negative unless indicated above

## 2017-05-29 NOTE — PROVIDER CONTACT NOTE (OTHER) - ACTION/TREATMENT ORDERED:
Dr Dennis aware that order needs to be on paper he will forward message to Dr. Cheung. Chemo order form and chemo consent left in front of chart to be filled out in AM.

## 2017-05-29 NOTE — ED PROVIDER NOTE - GASTROINTESTINAL, MLM
Abdomen soft, Tenderness in all 4 quadrants, No rebound, no guarding. small rectal fissure no fistula, Not able to obtain full Rectal IVANA due to pain however Colonoscopy results are in the ED for review.

## 2017-05-29 NOTE — ED PROVIDER NOTE - MEDICAL DECISION MAKING DETAILS
20 y/o male with rectal bleeding and ulcerative colitis will Admit patient as requested by GI. 20 y/o male with rectal bleeding and ulcerative colitis will Admit patient as requested by GI dr atkinson

## 2017-05-29 NOTE — ED ADULT NURSE REASSESSMENT NOTE - NS ED NURSE REASSESS COMMENT FT1
Pt's family feeding pt despite being advised that per ER MD that we're waiting for GI before giving pt food.

## 2017-05-29 NOTE — ED PROVIDER NOTE - OBJECTIVE STATEMENT
20 y/o male with ulcerative colitis on Outpt colonscopy with Dr. Ferrera(GI) on Friday (3 days ago) showing inflammatory dx throughout the colon. C/o Rectal pain, Bright red blood in stool, mild abd pain. denies fever. denies HA or neck pain. no chest pain or sob. no urinary f/u/d. no back pain. no motor or sensory deficits. denies drug use. no recent travel. no rash. no other acute issues symptoms or concerns

## 2017-05-29 NOTE — CONSULT NOTE ADULT - SUBJECTIVE AND OBJECTIVE BOX
HPI:  21 M with previously indeterminate colitis, worsening symptoms, poor response to prednisone and ASA derivatives, underwent colonoscopy friday confirming UC (pancolitis) Patient contineus to have diarrhea, not typically bloody, cramps, and also substantial anal pain. Fissure noted by ER MD. Currently patient is in [place]. Plan was to start Remicade. I d/w Dr Cheung who notes quantiferon outpatient was negative.    PAST MEDICAL & SURGICAL HISTORY:  Ulcerative proctitis with rectal bleeding      MEDICATIONS  (STANDING):  methylPREDNISolone sodium succinate Injectable 20milliGRAM(s) IV Push three times a day  inFLIXimab (REMICADE) IVPB 290milliGRAM(s) IV Intermittent once  diphenhydrAMINE   Injectable 25milliGRAM(s) IV Push once  acetaminophen   Tablet 650milliGRAM(s) Oral once    MEDICATIONS  (PRN):  morphine  - Injectable 4milliGRAM(s) IV Push every 4 hours PRN Moderate Pain (4 - 6)      Allergies    No Known Allergies    Intolerances        SOCIAL HISTORY:  No tob or etoh    FAMILY HISTORY:  No pertinent family history in first degree relatives      As above  Otherwise unremarkable    Vital Signs Last 24 Hrs  T(C): 36.3, Max: 36.3 (05-29 @ 07:30)  T(F): 97.3, Max: 97.3 (05-29 @ 07:30)  HR: 78 (78 - 78)  BP: 124/68 (124/68 - 124/68)  BP(mean): --  RR: 18 (18 - 18)  SpO2: 100% (100% - 100%)    Constitutional: NAD, well-developed  Respiratory: CTAB  Cardiovascular: S1 and S2, RRR  Gastrointestinal: BS+, soft, NT/ND  Extremities: No peripheral edema  Psychiatric: Normal mood, normal affect  Skin: No rashes    LABS:                        14.1   13.9  )-----------( 420      ( 29 May 2017 07:30 )             42.0     05-29    140  |  105  |  11  ----------------------------<  73  3.1<L>   |  28  |  0.91    Ca    8.8      29 May 2017 07:30  Phos  3.3     05-29  Mg     2.3     05-29    TPro  7.1  /  Alb  3.7  /  TBili  0.4  /  DBili  x   /  AST  9<L>  /  ALT  17  /  AlkPhos  45  05-29      LIVER FUNCTIONS - ( 29 May 2017 07:30 )  Alb: 3.7 g/dL / Pro: 7.1 gm/dL / ALK PHOS: 45 U/L / ALT: 17 U/L / AST: 9 U/L / GGT: x

## 2017-05-29 NOTE — CONSULT NOTE ADULT - ASSESSMENT
Imp:  UC flaring, unresponsive to PO steroids  Anal pain, ?fissure    Rec;  D/W Dr. Cheung who wants to start Remicade -- I agree this is appropriate Rx  D/W patient and family who are fully aware of risks and benefits and agree  Check hep serologies  Start Remicade 5 mg/kg  Colorectal surgery evaluation for anal pain  Low residue diet

## 2017-05-29 NOTE — H&P ADULT - PROBLEM SELECTOR PLAN 1
Ulcerative colitis with rectal pain and BRBPR  DDx: proctitis/UC flare  Obtain CT A/P with oral contrast  Steroids/Pain control/ IVF  GI consult Ulcerative colitis with rectal pain and BRBPR  DDx: proctitis/UC flare  Obtain CT A/P with oral contrast  Steroids/Remicade/Pain control/ IVF  GI consult Ulcerative colitis with rectal pain and BRBPR  DDx: proctitis/UC flare  Steroids/Remicade/Pain control/ IVF  GI/CR consult

## 2017-05-29 NOTE — ED ADULT TRIAGE NOTE - CHIEF COMPLAINT QUOTE
c/o abdominal pain and rectal bleeding, s/p colonoscopy on friday, pt states he has a history of ulcerative colitis

## 2017-05-29 NOTE — H&P ADULT - NSHPLABSRESULTS_GEN_ALL_CORE
LABS: All Labs Reviewed:                        14.1   13.9  )-----------( 420      ( 29 May 2017 07:30 )             42.0     05-29    140  |  105  |  11  ----------------------------<  73  3.1<L>   |  28  |  0.91    Ca    8.8      29 May 2017 07:30  Phos  3.3     05-29  Mg     2.3     05-29    TPro  7.1  /  Alb  3.7  /  TBili  0.4  /  DBili  x   /  AST  9<L>  /  ALT  17  /  AlkPhos  45  05-29              Blood Culture:

## 2017-05-30 VITALS — SYSTOLIC BLOOD PRESSURE: 130 MMHG | HEART RATE: 96 BPM | DIASTOLIC BLOOD PRESSURE: 67 MMHG

## 2017-05-30 LAB
ALBUMIN SERPL ELPH-MCNC: 3.2 G/DL — LOW (ref 3.3–5)
ALP SERPL-CCNC: 41 U/L — SIGNIFICANT CHANGE UP (ref 40–120)
ALT FLD-CCNC: 15 U/L — SIGNIFICANT CHANGE UP (ref 12–78)
ANION GAP SERPL CALC-SCNC: 6 MMOL/L — SIGNIFICANT CHANGE UP (ref 5–17)
AST SERPL-CCNC: 6 U/L — LOW (ref 15–37)
BILIRUB SERPL-MCNC: 0.4 MG/DL — SIGNIFICANT CHANGE UP (ref 0.2–1.2)
BUN SERPL-MCNC: 8 MG/DL — SIGNIFICANT CHANGE UP (ref 7–23)
CALCIUM SERPL-MCNC: 8.5 MG/DL — SIGNIFICANT CHANGE UP (ref 8.5–10.1)
CHLORIDE SERPL-SCNC: 107 MMOL/L — SIGNIFICANT CHANGE UP (ref 96–108)
CO2 SERPL-SCNC: 28 MMOL/L — SIGNIFICANT CHANGE UP (ref 22–31)
CREAT SERPL-MCNC: 0.74 MG/DL — SIGNIFICANT CHANGE UP (ref 0.5–1.3)
GLUCOSE SERPL-MCNC: 111 MG/DL — HIGH (ref 70–99)
HAV IGM SER-ACNC: SIGNIFICANT CHANGE UP
HBV CORE IGM SER-ACNC: SIGNIFICANT CHANGE UP
HBV SURFACE AG SER-ACNC: SIGNIFICANT CHANGE UP
HCT VFR BLD CALC: 39.6 % — SIGNIFICANT CHANGE UP (ref 39–50)
HCV AB S/CO SERPL IA: 0.13 S/CO — SIGNIFICANT CHANGE UP
HCV AB SERPL-IMP: SIGNIFICANT CHANGE UP
HGB BLD-MCNC: 13.2 G/DL — SIGNIFICANT CHANGE UP (ref 13–17)
MCHC RBC-ENTMCNC: 28.4 PG — SIGNIFICANT CHANGE UP (ref 27–34)
MCHC RBC-ENTMCNC: 33.2 GM/DL — SIGNIFICANT CHANGE UP (ref 32–36)
MCV RBC AUTO: 85.6 FL — SIGNIFICANT CHANGE UP (ref 80–100)
PLATELET # BLD AUTO: 390 K/UL — SIGNIFICANT CHANGE UP (ref 150–400)
POTASSIUM SERPL-MCNC: 4 MMOL/L — SIGNIFICANT CHANGE UP (ref 3.5–5.3)
POTASSIUM SERPL-SCNC: 4 MMOL/L — SIGNIFICANT CHANGE UP (ref 3.5–5.3)
PROT SERPL-MCNC: 6.4 GM/DL — SIGNIFICANT CHANGE UP (ref 6–8.3)
RBC # BLD: 4.63 M/UL — SIGNIFICANT CHANGE UP (ref 4.2–5.8)
RBC # FLD: 12.7 % — SIGNIFICANT CHANGE UP (ref 10.3–14.5)
SODIUM SERPL-SCNC: 141 MMOL/L — SIGNIFICANT CHANGE UP (ref 135–145)
WBC # BLD: 17.8 K/UL — HIGH (ref 3.8–10.5)
WBC # FLD AUTO: 17.8 K/UL — HIGH (ref 3.8–10.5)

## 2017-05-30 PROCEDURE — 99222 1ST HOSP IP/OBS MODERATE 55: CPT

## 2017-05-30 RX ORDER — DIPHENHYDRAMINE HCL 50 MG
50 CAPSULE ORAL EVERY 6 HOURS
Qty: 0 | Refills: 0 | Status: DISCONTINUED | OUTPATIENT
Start: 2017-05-30 | End: 2017-05-30

## 2017-05-30 RX ORDER — PANTOPRAZOLE SODIUM 20 MG/1
40 TABLET, DELAYED RELEASE ORAL DAILY
Qty: 0 | Refills: 0 | Status: DISCONTINUED | OUTPATIENT
Start: 2017-05-30 | End: 2017-05-30

## 2017-05-30 RX ORDER — OXYCODONE AND ACETAMINOPHEN 5; 325 MG/1; MG/1
1 TABLET ORAL
Qty: 42 | Refills: 0
Start: 2017-05-30 | End: 2017-06-06

## 2017-05-30 RX ORDER — DIPHENHYDRAMINE HCL 50 MG
25 CAPSULE ORAL ONCE
Qty: 0 | Refills: 0 | Status: COMPLETED | OUTPATIENT
Start: 2017-05-30 | End: 2017-05-30

## 2017-05-30 RX ORDER — PANTOPRAZOLE SODIUM 20 MG/1
1 TABLET, DELAYED RELEASE ORAL
Qty: 30 | Refills: 0
Start: 2017-05-30

## 2017-05-30 RX ORDER — HYDROMORPHONE HYDROCHLORIDE 2 MG/ML
1 INJECTION INTRAMUSCULAR; INTRAVENOUS; SUBCUTANEOUS ONCE
Qty: 0 | Refills: 0 | Status: DISCONTINUED | OUTPATIENT
Start: 2017-05-30 | End: 2017-05-30

## 2017-05-30 RX ORDER — ACETAMINOPHEN 500 MG
650 TABLET ORAL EVERY 6 HOURS
Qty: 0 | Refills: 0 | Status: DISCONTINUED | OUTPATIENT
Start: 2017-05-30 | End: 2017-05-30

## 2017-05-30 RX ORDER — HYDROCORTISONE 1 %
1 OINTMENT (GRAM) TOPICAL
Qty: 1 | Refills: 0
Start: 2017-05-30 | End: 2017-06-13

## 2017-05-30 RX ORDER — ACETAMINOPHEN 500 MG
2 TABLET ORAL
Qty: 0 | Refills: 0 | DISCHARGE
Start: 2017-05-30

## 2017-05-30 RX ORDER — INFLIXIMAB-DYYB 120 MG/ML
290 INJECTION SUBCUTANEOUS ONCE
Qty: 0 | Refills: 0 | Status: COMPLETED | OUTPATIENT
Start: 2017-05-30 | End: 2017-05-30

## 2017-05-30 RX ORDER — ONDANSETRON 8 MG/1
4 TABLET, FILM COATED ORAL ONCE
Qty: 0 | Refills: 0 | Status: COMPLETED | OUTPATIENT
Start: 2017-05-30 | End: 2017-05-30

## 2017-05-30 RX ORDER — HYDROMORPHONE HYDROCHLORIDE 2 MG/ML
2 INJECTION INTRAMUSCULAR; INTRAVENOUS; SUBCUTANEOUS ONCE
Qty: 0 | Refills: 0 | Status: DISCONTINUED | OUTPATIENT
Start: 2017-05-30 | End: 2017-05-30

## 2017-05-30 RX ORDER — HYDROCORTISONE 1 %
1 OINTMENT (GRAM) TOPICAL DAILY
Qty: 0 | Refills: 0 | Status: DISCONTINUED | OUTPATIENT
Start: 2017-05-30 | End: 2017-05-30

## 2017-05-30 RX ADMIN — Medication 20 MILLIGRAM(S): at 05:47

## 2017-05-30 RX ADMIN — Medication 2 MILLIGRAM(S): at 14:21

## 2017-05-30 RX ADMIN — Medication 650 MILLIGRAM(S): at 13:05

## 2017-05-30 RX ADMIN — MORPHINE SULFATE 4 MILLIGRAM(S): 50 CAPSULE, EXTENDED RELEASE ORAL at 06:00

## 2017-05-30 RX ADMIN — HYDROMORPHONE HYDROCHLORIDE 1 MILLIGRAM(S): 2 INJECTION INTRAMUSCULAR; INTRAVENOUS; SUBCUTANEOUS at 09:05

## 2017-05-30 RX ADMIN — ONDANSETRON 4 MILLIGRAM(S): 8 TABLET, FILM COATED ORAL at 06:11

## 2017-05-30 RX ADMIN — INFLIXIMAB-DYYB 125 MILLIGRAM(S): 120 INJECTION SUBCUTANEOUS at 13:20

## 2017-05-30 RX ADMIN — Medication 25 MILLIGRAM(S): at 12:45

## 2017-05-30 RX ADMIN — Medication 20 MILLIGRAM(S): at 12:40

## 2017-05-30 RX ADMIN — HYDROMORPHONE HYDROCHLORIDE 1 MILLIGRAM(S): 2 INJECTION INTRAMUSCULAR; INTRAVENOUS; SUBCUTANEOUS at 09:20

## 2017-05-30 RX ADMIN — MORPHINE SULFATE 4 MILLIGRAM(S): 50 CAPSULE, EXTENDED RELEASE ORAL at 05:53

## 2017-05-30 RX ADMIN — PANTOPRAZOLE SODIUM 40 MILLIGRAM(S): 20 TABLET, DELAYED RELEASE ORAL at 12:40

## 2017-05-30 RX ADMIN — Medication 1 SUPPOSITORY(S): at 10:45

## 2017-05-30 NOTE — PROGRESS NOTE ADULT - ASSESSMENT
20 yo man admitted with ulcerative pancolitis and anorectal pain.    -Starting remicade today. IRBA d/w patient. Consent obtained.  -Planned DC after remicade today  -Plan to DC home on prednisone 40 mg  -F/U OV with Dr. Cheung Thursday  Miller guerrero  -CRS follow up

## 2017-05-30 NOTE — PROGRESS NOTE ADULT - SUBJECTIVE AND OBJECTIVE BOX
:  20 yo male with pmh  recently diagnosed ulcerative colitis presents with complaints of rectal pain, bright serg blood per rectum, mid abd pain.   denies fevers/recent travel. Recently underwent colonoscopy 3 days ago with dr Cheung who discovered inflammatory changes throughout the colon    5/30: rectal pain worse today.       ED course: WBC 13.9. Afebrile. Solumderol/morphine given (29 May 2017 14:38)        REVIEW OF SYSTEMS:    CONSTITUTIONAL: No weakness, fevers or chills  EYES/ENT: No visual changes;  No vertigo or throat pain   NECK: No pain or stiffness  RESPIRATORY: No cough, wheezing, hemoptysis; No shortness of breath  CARDIOVASCULAR: No chest pain or palpitations  GASTROINTESTINAL: No abdominal or epigastric pain. No nausea, vomiting, or hematemesis; No diarrhea or constipation. No melena or hematochezia.  GENITOURINARY: No dysuria, frequency or hematuria  NEUROLOGICAL: No numbness or weakness  SKIN: No itching, burning, rashes, or lesions   All other review of systems is negative unless indicated above      Weight (kg): 58.1 (05-29 @ 14:17)    Vital Signs Last 24 Hrs  T(C): 36.7, Max: 36.7 (05-30 @ 05:11)  T(F): 98.1, Max: 98.1 (05-30 @ 05:11)  HR: 72 (72 - 83)  BP: 126/60 (126/60 - 133/86)  BP(mean): --  RR: 18 (18 - 20)  SpO2: 99% (99% - 100%)    I&O's Summary      CAPILLARY BLOOD GLUCOSE      PHYSICAL EXAM:    Constitutional: NAD, awake and alert, well-developed  HEENT: PERR, EOMI, Normal Hearing, MMM  Neck: Soft and supple, No LAD, No JVD  Respiratory: Breath sounds are clear bilaterally, No wheezing, rales or rhonchi  Cardiovascular: S1 and S2, regular rate and rhythm, no Murmurs, gallops or rubs  Gastrointestinal: Bowel Sounds present, soft, no obvious hemorrhoids or surrounding erythema  Extremities: No peripheral edema  Vascular: 2+ peripheral pulses  Neurological: A/O x 3, no focal deficits  Musculoskeletal: 5/5 strength b/l upper and lower extremities  Skin: No rashes    MEDICATIONS:  MEDICATIONS  (STANDING):  methylPREDNISolone sodium succinate Injectable 20milliGRAM(s) IV Push three times a day  inFLIXimab (REMICADE) IVPB 290milliGRAM(s) IV Intermittent once  diphenhydrAMINE   Injectable 25milliGRAM(s) IV Push once  acetaminophen   Tablet 650milliGRAM(s) Oral once  sodium chloride 0.9%. 1000milliLiter(s) IV Continuous <Continuous>  HYDROmorphone  Injectable 2milliGRAM(s) IV Push once      LABS: All Labs Reviewed:                        13.2   17.8  )-----------( 390      ( 30 May 2017 06:24 )             39.6     05-30    141  |  107  |  8   ----------------------------<  111<H>  4.0   |  28  |  0.74    Ca    8.5      30 May 2017 06:24  Phos  3.3     05-29  Mg     2.3     05-29    TPro  6.4  /  Alb  3.2<L>  /  TBili  0.4  /  DBili  x   /  AST  6<L>  /  ALT  15  /  AlkPhos  41  05-30              Blood Culture:     RADIOLOGY/EKG:

## 2017-05-30 NOTE — DISCHARGE NOTE ADULT - PLAN OF CARE
reduce inflammation remicade infusion. Cont prednisone 40 qd. Anusol and sitz baths. Low residue diet

## 2017-05-30 NOTE — DISCHARGE NOTE ADULT - MEDICATION SUMMARY - MEDICATIONS TO TAKE
I will START or STAY ON the medications listed below when I get home from the hospital:    predniSONE 20 mg oral tablet  -- 2 tab(s) by mouth once a day  -- Indication: For UC    acetaminophen 325 mg oral tablet  -- 2 tab(s) by mouth every 6 hours, As needed, pain and fever  -- Indication: For Rectal pain    LORazepam 0.5 mg oral tablet  -- 0.5 - 1 tab(s) by mouth 2 times a day as needed   -- Indication: For Rectal spasms    hydrocortisone 25 mg rectal suppository  -- 1 suppository(ies) rectally once a day  -- Indication: For Rectal pain    Protonix 40 mg oral granule, enteric coated  -- 1 each by mouth once a day  -- It is very important that you take or use this exactly as directed.  Do not skip doses or discontinue unless directed by your doctor.  Obtain medical advice before taking any non-prescription drugs as some may affect the action of this medication.    -- Indication: For antacid    Vitamin D3 50,000 intl units oral capsule  -- 1 cap(s) by mouth every other week  -- Indication: For supplement

## 2017-05-30 NOTE — PROGRESS NOTE ADULT - PROBLEM SELECTOR PLAN 1
Ulcerative colitis with rectal pain and BRBPR  DDx: proctitis/UC flare/poss anal fissure  Dilaudid/valium for rectal spasms, add anusol/sitz baths  DW CR team   Steroids/Remicade/Pain control/ IVF

## 2017-05-30 NOTE — CONSULT NOTE ADULT - ASSESSMENT
21 year old with ulcerating colitis. No definite anal fissure noted but there is excoriation and erythema on the left perianal region where is pain is. Likely related to frequent bowel movements which he has been having. Recommend sitz bath and using a barrier ointment such as calmoseptine or desitin. If he does not improve, I will ? a small abscess although there was no induration or fluctuance to suggest this on my exam. Patient to start remicade for UC per GI.

## 2017-05-30 NOTE — PROGRESS NOTE ADULT - SUBJECTIVE AND OBJECTIVE BOX
HPI:  22 yo man admitted with uncontrolled ulcerative pancolitis and anorectal pain. Starting remicade today. No fever. No abdominal pain. No BM since yesterday am. +anorectal pain.    MEDICATIONS  (STANDING):  methylPREDNISolone sodium succinate Injectable 20milliGRAM(s) IV Push three times a day  diphenhydrAMINE   Injectable 25milliGRAM(s) IV Push once  acetaminophen   Tablet 650milliGRAM(s) Oral once  sodium chloride 0.9%. 1000milliLiter(s) IV Continuous <Continuous>  pantoprazole  Injectable 40milliGRAM(s) IV Push daily  hydrocortisone hemorrhoidal Suppository 1Suppository(s) Rectal daily  diphenhydrAMINE   Capsule 50milliGRAM(s) Oral every 6 hours  methylPREDNISolone sodium succinate Injectable 60milliGRAM(s) IV Push once  inFLIXimab (REMICADE) IVPB 290milliGRAM(s) IV Intermittent once    MEDICATIONS  (PRN):  morphine  - Injectable 4milliGRAM(s) IV Push every 4 hours PRN Moderate Pain (4 - 6)  oxyCODONE  5 mG/acetaminophen 325 mG 1Tablet(s) Oral every 4 hours PRN Moderate Pain (4 - 6)  diazepam    Tablet 2milliGRAM(s) Oral at bedtime PRN anxiety/imsomnia  acetaminophen   Tablet 650milliGRAM(s) Oral every 6 hours PRN pain and fever      Allergies    No Known Allergies    Intolerances        REVIEW OF SYSTEMS    General: no fever    HEENT: no icterus    Respiratory and Thorax: no SOB  	  Cardiovascular: no CP    Gastrointestinal: as above    Skin: no jaundice      Vital Signs Last 24 Hrs  T(C): 36.7, Max: 36.7 (05-30 @ 05:11)  T(F): 98.1, Max: 98.1 (05-30 @ 05:11)  HR: 72 (72 - 83)  BP: 126/60 (126/60 - 133/86)  BP(mean): --  RR: 18 (18 - 20)  SpO2: 99% (99% - 100%)    PHYSICAL EXAM:    Constitutional: NAD    HEENT: anicteric    Respiratory: CTA BL    Cardiovascular:  RRR    Gastrointestinal: soft ND +BS NTTP    Extremities: no LE edema    Neuro: no focal deficits    Skin: no jaundice      LABS:                        13.2   17.8  )-----------( 390      ( 30 May 2017 06:24 )             39.6     05-30    141  |  107  |  8   ----------------------------<  111<H>  4.0   |  28  |  0.74    Ca    8.5      30 May 2017 06:24  Phos  3.3     05-29  Mg     2.3     05-29    TPro  6.4  /  Alb  3.2<L>  /  TBili  0.4  /  DBili  x   /  AST  6<L>  /  ALT  15  /  AlkPhos  41  05-30      LIVER FUNCTIONS - ( 30 May 2017 06:24 )  Alb: 3.2 g/dL / Pro: 6.4 gm/dL / ALK PHOS: 41 U/L / ALT: 15 U/L / AST: 6 U/L / GGT: x             RADIOLOGY & ADDITIONAL STUDIES:

## 2017-05-30 NOTE — DISCHARGE NOTE ADULT - HOSPITAL COURSE
22 yo male with pmh  recently diagnosed ulcerative colitis presents with complaints of rectal pain, bright serg blood per rectum, mid abd pain.   denies fevers/recent travel. Recently underwent colonoscopy 3 days ago with dr Cheung who discovered inflammatory changes throughout the colon. Remicade infusion begun here. Increased prednisone to 40 po qd. Follow up with Dr Cheung this week. Low residue diet, anusol cream and sitz bath for symptomatic relief      REVIEW OF SYSTEMS:     All other review of systems is negative unless indicated above        Vital Signs Last 24 Hrs  T(C): 36.7, Max: 36.7 (05-30 @ 05:11)  T(F): 98.1, Max: 98.1 (05-30 @ 05:11)  HR: 96 (72 - 105)  BP: 130/67 (126/60 - 145/74)  BP(mean): --  RR: 18 (18 - 18)  SpO2: 99% (99% - 100%)        PHYSICAL EXAM:    Constitutional: NAD, awake and alert, well-developed  HEENT: PERR, EOMI, Normal Hearing, MMM  Neck: Soft and supple, No LAD, No JVD  Respiratory: Breath sounds are clear bilaterally, No wheezing, rales or rhonchi  Cardiovascular: S1 and S2, regular rate and rhythm, no Murmurs, gallops or rubs  Gastrointestinal: Bowel Sounds present, soft, nontender, nondistended, no guarding, no rebound  Extremities: No peripheral edema  Vascular: 2+ peripheral pulses  Neurological: A/O x 3, no focal deficits  Musculoskeletal: 5/5 strength b/l upper and lower extremities  Skin: No rashes            LABS: All Labs Reviewed:                        13.2   17.8  )-----------( 390      ( 30 May 2017 06:24 )             39.6     05-30    141  |  107  |  8   ----------------------------<  111<H>  4.0   |  28  |  0.74    Ca    8.5      30 May 2017 06:24  Phos  3.3     05-29  Mg     2.3     05-29    TPro  6.4  /  Alb  3.2<L>  /  TBili  0.4  /  DBili  x   /  AST  6<L>  /  ALT  15  /  AlkPhos  41  05-30              Blood Culture:     RADIOLOGY/EKG:    Total D/C time > 30 min

## 2017-05-30 NOTE — DISCHARGE NOTE ADULT - CARE PROVIDER_API CALL
Sidney Cheung), Gastroenterology; Internal Medicine  [address] [place] [address]  Phone: [phone]  Fax: [phone]

## 2017-05-30 NOTE — DISCHARGE NOTE ADULT - CARE PLAN
Principal Discharge DX:	Ulcerative proctitis with rectal bleeding  Goal:	reduce inflammation  Instructions for follow-up, activity and diet:	remicade infusion. Cont prednisone 40 qd. Anusol and sitz baths. Low residue diet

## 2017-05-30 NOTE — DISCHARGE NOTE ADULT - MEDICATION SUMMARY - MEDICATIONS TO CHANGE
I will SWITCH the dose or number of times a day I take the medications listed below when I get home from the hospital:    predniSONE 20 mg oral tablet  -- 1 tab(s) by mouth once a day

## 2017-05-30 NOTE — CONSULT NOTE ADULT - SUBJECTIVE AND OBJECTIVE BOX
21 year old male with ulcerative colitis currently on prednisone who presents with a 4 day history of anal pain. He recently had a colonoscopy which showed severe colitis. The pain is mostly on the left side and in the perianal region. No drainage but he is having multiple frequent loose sometimes bloody stools. No anal pressure, fevers or chills. No history of fistula or abscess.     PAST MEDICAL & SURGICAL HISTORY:  Ulcerative colitis  No prior surgeries    Allergies: No Known Allergies    Family history: ? grand aunt with colitis    ROS: negative except mentioned in HPI    Social history: social EtoH use, non smoker, marijuana use    Exam:  Vital Signs Last 24 Hrs  T(C): 36.7, Max: 36.7 (05-30 @ 05:11)  T(F): 98.1, Max: 98.1 (05-30 @ 05:11)  HR: 72 (72 - 83)  BP: 126/60 (126/60 - 133/86)  RR: 18 (18 - 20)  SpO2: 99% (99% - 100%)    General: in no distress  HEENT: normocephalic, atraumatic  Respiratory: non labored breathing on room air  Cardiovascular: regular rate and rhythm  Abdomen: soft, non tender, non distended  Rectal: limited by pt cooperation, erythema and tenderness in the left perianal region with excoriation noted. No fluctuance noted. No fissure appreciated. IVANA not performed  Extremities: moves all extremities without difficulty  Skin: warm and dry  Neuro: alert and oriented x 3                          13.2   17.8  )-----------( 390      ( 30 May 2017 06:24 )             39.6   05-30    141  |  107  |  8   ----------------------------<  111<H>  4.0   |  28  |  0.74    Ca    8.5      30 May 2017 06:24  Phos  3.3     05-29  Mg     2.3     05-29    TPro  6.4  /  Alb  3.2<L>  /  TBili  0.4  /  DBili  x   /  AST  6<L>  /  ALT  15  /  AlkPhos  41  05-30

## 2017-06-02 ENCOUNTER — APPOINTMENT (OUTPATIENT)
Dept: COLORECTAL SURGERY | Facility: CLINIC | Age: 22
End: 2017-06-02
Payer: COMMERCIAL

## 2017-06-02 ENCOUNTER — LABORATORY RESULT (OUTPATIENT)
Age: 22
End: 2017-06-02

## 2017-06-02 VITALS
HEART RATE: 81 BPM | WEIGHT: 125 LBS | DIASTOLIC BLOOD PRESSURE: 73 MMHG | TEMPERATURE: 97.8 F | HEIGHT: 67 IN | BODY MASS INDEX: 19.62 KG/M2 | SYSTOLIC BLOOD PRESSURE: 134 MMHG

## 2017-06-02 DIAGNOSIS — Z86.59 PERSONAL HISTORY OF OTHER MENTAL AND BEHAVIORAL DISORDERS: ICD-10-CM

## 2017-06-02 DIAGNOSIS — K51.911 ULCERATIVE COLITIS, UNSPECIFIED WITH RECTAL BLEEDING: ICD-10-CM

## 2017-06-02 DIAGNOSIS — Z79.899 OTHER LONG TERM (CURRENT) DRUG THERAPY: ICD-10-CM

## 2017-06-02 DIAGNOSIS — Z87.19 PERSONAL HISTORY OF OTHER DISEASES OF THE DIGESTIVE SYSTEM: ICD-10-CM

## 2017-06-02 DIAGNOSIS — Z87.891 PERSONAL HISTORY OF NICOTINE DEPENDENCE: ICD-10-CM

## 2017-06-02 DIAGNOSIS — K62.5 HEMORRHAGE OF ANUS AND RECTUM: ICD-10-CM

## 2017-06-02 PROCEDURE — 46050 I&D PERIANAL ABSCESS SUPFC: CPT

## 2017-06-02 PROCEDURE — 99243 OFF/OP CNSLTJ NEW/EST LOW 30: CPT | Mod: 25

## 2017-06-02 RX ORDER — MESALAMINE 1000 MG/1
1000 SUPPOSITORY RECTAL
Qty: 30 | Refills: 0 | Status: DISCONTINUED | COMMUNITY
Start: 2017-02-03

## 2017-06-02 RX ORDER — CIPROFLOXACIN HYDROCHLORIDE 500 MG/1
500 TABLET, FILM COATED ORAL
Qty: 28 | Refills: 0 | Status: DISCONTINUED | COMMUNITY
Start: 2017-03-13

## 2017-06-02 RX ORDER — LEVOFLOXACIN 500 MG/1
500 TABLET, FILM COATED ORAL
Qty: 7 | Refills: 0 | Status: DISCONTINUED | COMMUNITY
Start: 2016-12-23

## 2017-06-02 RX ORDER — AMOXICILLIN AND CLAVULANATE POTASSIUM 875; 125 MG/1; MG/1
875-125 TABLET, COATED ORAL
Qty: 20 | Refills: 0 | Status: DISCONTINUED | COMMUNITY
Start: 2017-01-06 | End: 2017-06-02

## 2017-06-02 RX ORDER — PRAMOXINE HYDROCHLORIDE HYDROCORTISONE ACETATE 100; 100 MG/10G; MG/10G
1-1 AEROSOL, FOAM TOPICAL
Qty: 30 | Refills: 0 | Status: DISCONTINUED | COMMUNITY
Start: 2017-05-24

## 2017-06-02 RX ORDER — INFLIXIMAB 100 MG/10ML
INJECTION, POWDER, LYOPHILIZED, FOR SOLUTION INTRAVENOUS
Refills: 0 | Status: ACTIVE | COMMUNITY

## 2017-06-02 RX ORDER — DIAZEPAM 5 MG/1
5 TABLET ORAL
Qty: 60 | Refills: 0 | Status: ACTIVE | COMMUNITY
Start: 2017-04-07

## 2017-06-02 RX ORDER — HYDROCORTISONE ACETATE 1500 MG/15G
10 AEROSOL, FOAM TOPICAL
Qty: 30 | Refills: 0 | Status: DISCONTINUED | COMMUNITY
Start: 2017-03-20

## 2017-06-02 RX ORDER — PREDNISONE 10 MG/1
10 TABLET ORAL
Refills: 0 | Status: ACTIVE | COMMUNITY
Start: 2017-06-02

## 2017-06-02 RX ORDER — PANTOPRAZOLE 40 MG/1
40 TABLET, DELAYED RELEASE ORAL
Qty: 14 | Refills: 0 | Status: ACTIVE | COMMUNITY
Start: 2017-01-15

## 2017-06-02 RX ORDER — NALOXEGOL OXALATE 25 MG/1
25 TABLET, FILM COATED ORAL
Qty: 30 | Refills: 0 | Status: DISCONTINUED | COMMUNITY
Start: 2017-05-31

## 2017-06-02 RX ORDER — DIAZEPAM 5 MG/1
TABLET ORAL
Refills: 0 | Status: ACTIVE | COMMUNITY

## 2017-06-02 RX ORDER — HYDROCODONE BITARTRATE AND ACETAMINOPHEN 5; 325 MG/1; MG/1
5-325 TABLET ORAL
Qty: 12 | Refills: 0 | Status: DISCONTINUED | COMMUNITY
Start: 2017-03-10

## 2017-06-02 RX ORDER — PREDNISONE 20 MG/1
20 TABLET ORAL
Qty: 60 | Refills: 0 | Status: DISCONTINUED | COMMUNITY
Start: 2017-01-26

## 2017-06-02 RX ORDER — CHLORHEXIDINE GLUCONATE, 0.12% ORAL RINSE 1.2 MG/ML
0.12 SOLUTION DENTAL
Qty: 473 | Refills: 0 | Status: DISCONTINUED | COMMUNITY
Start: 2017-03-30

## 2017-06-02 RX ORDER — METRONIDAZOLE 500 MG/1
500 TABLET ORAL
Qty: 42 | Refills: 0 | Status: DISCONTINUED | COMMUNITY
Start: 2017-03-13

## 2017-06-02 RX ORDER — PREDNISONE 10 MG
TABLET ORAL
Refills: 0 | Status: DISCONTINUED | COMMUNITY

## 2017-06-02 RX ORDER — OXYCODONE AND ACETAMINOPHEN 5; 325 MG/1; MG/1
5-325 TABLET ORAL
Qty: 42 | Refills: 0 | Status: DISCONTINUED | COMMUNITY
Start: 2017-05-30

## 2017-06-02 RX ORDER — HYDROCORTISONE ACETATE 25 MG/1
25 SUPPOSITORY RECTAL
Qty: 30 | Refills: 0 | Status: DISCONTINUED | COMMUNITY
Start: 2017-05-30

## 2017-06-02 RX ORDER — PREDNISONE 10 MG/1
10 TABLET ORAL
Qty: 100 | Refills: 0 | Status: DISCONTINUED | COMMUNITY
Start: 2017-03-08

## 2017-06-02 RX ORDER — CHOLECALCIFEROL (VITAMIN D3) 1250 MCG
1.25 MG CAPSULE ORAL
Qty: 4 | Refills: 0 | Status: DISCONTINUED | COMMUNITY
Start: 2017-04-07

## 2017-06-02 RX ORDER — CHLORDIAZEPOXIDE HYDROCHLORIDE AND CLIDINIUM BROMIDE 5; 2.5 MG/1; MG/1
5-2.5 CAPSULE, GELATIN COATED ORAL
Qty: 24 | Refills: 0 | Status: DISCONTINUED | COMMUNITY
Start: 2017-01-25

## 2017-06-02 RX ORDER — ALUMINUM CHLORIDE 20 %
20 SOLUTION, NON-ORAL TOPICAL
Qty: 60 | Refills: 0 | Status: DISCONTINUED | COMMUNITY
Start: 2017-04-12

## 2017-06-02 RX ORDER — LORAZEPAM 0.5 MG/1
0.5 TABLET ORAL
Qty: 30 | Refills: 0 | Status: DISCONTINUED | COMMUNITY
Start: 2017-05-30

## 2017-06-02 RX ORDER — ADALIMUMAB 40MG/0.8ML
40 KIT SUBCUTANEOUS
Qty: 6 | Refills: 0 | Status: DISCONTINUED | COMMUNITY
Start: 2017-03-29

## 2017-06-02 RX ORDER — OXYCODONE HYDROCHLORIDE AND ACETAMINOPHEN 10; 325 MG/1; MG/1
TABLET ORAL
Refills: 0 | Status: DISCONTINUED | COMMUNITY

## 2017-06-02 RX ORDER — TRAMADOL HYDROCHLORIDE 50 MG/1
50 TABLET, COATED ORAL
Qty: 90 | Refills: 0 | Status: DISCONTINUED | COMMUNITY
Start: 2017-06-01 | End: 2017-06-02

## 2017-06-02 RX ORDER — VEDOLIZUMAB 300 MG/5ML
300 INJECTION, POWDER, LYOPHILIZED, FOR SOLUTION INTRAVENOUS
Qty: 2 | Refills: 0 | Status: DISCONTINUED | COMMUNITY
Start: 2017-04-28

## 2017-06-02 RX ORDER — ONDANSETRON 4 MG/1
4 TABLET, ORALLY DISINTEGRATING ORAL
Qty: 18 | Refills: 0 | Status: DISCONTINUED | COMMUNITY
Start: 2017-03-13

## 2017-06-07 ENCOUNTER — APPOINTMENT (OUTPATIENT)
Dept: COLORECTAL SURGERY | Facility: CLINIC | Age: 22
End: 2017-06-07

## 2017-06-07 VITALS
RESPIRATION RATE: 14 BRPM | HEART RATE: 85 BPM | HEIGHT: 67 IN | SYSTOLIC BLOOD PRESSURE: 121 MMHG | WEIGHT: 125 LBS | DIASTOLIC BLOOD PRESSURE: 76 MMHG | TEMPERATURE: 97.6 F | BODY MASS INDEX: 19.62 KG/M2

## 2017-06-07 DIAGNOSIS — K61.0 ANAL ABSCESS: ICD-10-CM

## 2017-07-25 ENCOUNTER — APPOINTMENT (OUTPATIENT)
Dept: COLORECTAL SURGERY | Facility: CLINIC | Age: 22
End: 2017-07-25

## 2017-07-25 VITALS
HEART RATE: 89 BPM | WEIGHT: 125 LBS | DIASTOLIC BLOOD PRESSURE: 82 MMHG | HEIGHT: 67 IN | RESPIRATION RATE: 14 BRPM | SYSTOLIC BLOOD PRESSURE: 135 MMHG | TEMPERATURE: 97.6 F | BODY MASS INDEX: 19.62 KG/M2

## 2017-07-26 RX ORDER — TRAMADOL HYDROCHLORIDE 50 MG/1
50 TABLET, COATED ORAL
Refills: 0 | Status: ACTIVE | COMMUNITY

## 2017-07-26 RX ORDER — AZELASTINE HYDROCHLORIDE 137 UG/1
0.1 SPRAY, METERED NASAL
Qty: 30 | Refills: 0 | Status: ACTIVE | COMMUNITY
Start: 2017-06-27

## 2017-07-26 RX ORDER — MONTELUKAST 10 MG/1
10 TABLET, FILM COATED ORAL
Qty: 30 | Refills: 0 | Status: ACTIVE | COMMUNITY
Start: 2017-06-27

## 2018-03-02 ENCOUNTER — APPOINTMENT (OUTPATIENT)
Dept: UROLOGY | Facility: CLINIC | Age: 23
End: 2018-03-02
Payer: COMMERCIAL

## 2018-03-02 VITALS
WEIGHT: 140 LBS | OXYGEN SATURATION: 98 % | DIASTOLIC BLOOD PRESSURE: 77 MMHG | TEMPERATURE: 97.7 F | HEIGHT: 67 IN | RESPIRATION RATE: 16 BRPM | HEART RATE: 88 BPM | SYSTOLIC BLOOD PRESSURE: 120 MMHG | BODY MASS INDEX: 21.97 KG/M2

## 2018-03-02 PROCEDURE — 99244 OFF/OP CNSLTJ NEW/EST MOD 40: CPT

## 2018-03-20 ENCOUNTER — APPOINTMENT (OUTPATIENT)
Dept: UROLOGY | Facility: CLINIC | Age: 23
End: 2018-03-20
Payer: COMMERCIAL

## 2018-03-20 DIAGNOSIS — R35.0 FREQUENCY OF MICTURITION: ICD-10-CM

## 2018-03-20 LAB
BILIRUB UR QL STRIP: NEGATIVE
CLARITY UR: CLEAR
COLLECTION METHOD: NORMAL
GLUCOSE UR-MCNC: NEGATIVE
HCG UR QL: 0.2 EU/DL
HGB UR QL STRIP.AUTO: NEGATIVE
KETONES UR-MCNC: NEGATIVE
LEUKOCYTE ESTERASE UR QL STRIP: NEGATIVE
NITRITE UR QL STRIP: NEGATIVE
PH UR STRIP: 7
PROT UR STRIP-MCNC: NEGATIVE
SP GR UR STRIP: 1.01

## 2018-03-20 PROCEDURE — 51798 US URINE CAPACITY MEASURE: CPT | Mod: 59

## 2018-03-20 PROCEDURE — 81003 URINALYSIS AUTO W/O SCOPE: CPT | Mod: QW

## 2018-03-20 PROCEDURE — 99213 OFFICE O/P EST LOW 20 MIN: CPT | Mod: 25

## 2018-03-20 PROCEDURE — 51741 ELECTRO-UROFLOWMETRY FIRST: CPT

## 2018-03-21 PROBLEM — R35.0 URINARY FREQUENCY: Status: ACTIVE | Noted: 2018-03-02

## 2018-08-08 ENCOUNTER — RESULT REVIEW (OUTPATIENT)
Age: 23
End: 2018-08-08

## 2019-05-03 ENCOUNTER — RECORD ABSTRACTING (OUTPATIENT)
Age: 24
End: 2019-05-03

## 2019-05-03 DIAGNOSIS — F19.90 OTHER PSYCHOACTIVE SUBSTANCE USE, UNSPECIFIED, UNCOMPLICATED: ICD-10-CM

## 2019-05-03 DIAGNOSIS — R14.0 ABDOMINAL DISTENSION (GASEOUS): ICD-10-CM

## 2019-05-03 DIAGNOSIS — K59.00 CONSTIPATION, UNSPECIFIED: ICD-10-CM

## 2019-05-03 RX ORDER — PRAMOXINE HYDROCHLORIDE HYDROCORTISONE ACETATE 100; 100 MG/10G; MG/10G
1-1 AEROSOL, FOAM TOPICAL
Refills: 0 | Status: ACTIVE | COMMUNITY

## 2019-05-03 RX ORDER — INFLIXIMAB 100 MG/10ML
100 INJECTION, POWDER, LYOPHILIZED, FOR SOLUTION INTRAVENOUS
Refills: 0 | Status: ACTIVE | COMMUNITY

## 2019-05-10 ENCOUNTER — RESULT REVIEW (OUTPATIENT)
Age: 24
End: 2019-05-10

## 2019-05-10 ENCOUNTER — APPOINTMENT (OUTPATIENT)
Dept: GASTROENTEROLOGY | Facility: AMBULATORY MEDICAL SERVICES | Age: 24
End: 2019-05-10
Payer: COMMERCIAL

## 2019-05-10 PROCEDURE — 45380 COLONOSCOPY AND BIOPSY: CPT

## 2019-05-10 PROCEDURE — 43235 EGD DIAGNOSTIC BRUSH WASH: CPT

## 2019-05-13 ENCOUNTER — RX RENEWAL (OUTPATIENT)
Age: 24
End: 2019-05-13

## 2019-06-06 ENCOUNTER — APPOINTMENT (OUTPATIENT)
Dept: GASTROENTEROLOGY | Facility: CLINIC | Age: 24
End: 2019-06-06
Payer: COMMERCIAL

## 2019-06-06 VITALS
BODY MASS INDEX: 20.4 KG/M2 | HEIGHT: 67 IN | SYSTOLIC BLOOD PRESSURE: 112 MMHG | TEMPERATURE: 97.8 F | DIASTOLIC BLOOD PRESSURE: 66 MMHG | HEART RATE: 86 BPM | WEIGHT: 130 LBS

## 2019-06-06 DIAGNOSIS — Z82.49 FAMILY HISTORY OF ISCHEMIC HEART DISEASE AND OTHER DISEASES OF THE CIRCULATORY SYSTEM: ICD-10-CM

## 2019-06-06 DIAGNOSIS — L81.8 OTHER SPECIFIED DISORDERS OF PIGMENTATION: ICD-10-CM

## 2019-06-06 PROCEDURE — 99214 OFFICE O/P EST MOD 30 MIN: CPT

## 2019-06-06 RX ORDER — BUDESONIDE 28 MG/1
2 AEROSOL, FOAM RECTAL
Qty: 30 | Refills: 5 | Status: ACTIVE | COMMUNITY
Start: 2019-06-06 | End: 1900-01-01

## 2019-06-10 PROBLEM — L81.8 HISTORY OF TATTOO: Status: ACTIVE | Noted: 2019-06-06

## 2019-06-10 PROBLEM — Z82.49 FAMILY HISTORY OF HYPERTENSION: Status: ACTIVE | Noted: 2019-06-06

## 2019-06-10 NOTE — HISTORY OF PRESENT ILLNESS
[de-identified] : 24yo male with colitis overall doing well \par He did proctofoam and then stopped as he ran out\par He occasional mild symptoms with mild intermittent bleeding.  BM largely normal without pain..\par His recent increase in fatigue has improved.

## 2019-06-10 NOTE — REVIEW OF SYSTEMS
[Abdominal Pain] : abdominal pain [Feeling Tired] : feeling tired [As Noted in HPI] : as noted in HPI [Melena] : no melena [Vomiting] : no vomiting [Diarrhea] : diarrhea [Negative] : Neurological

## 2019-06-10 NOTE — ASSESSMENT
[FreeTextEntry1] : 22yo male with UC in remission\par He has been reluctant to take meds a she has been doing well recently.  Most of his symptoms seem to be from his proctitis.  We agreed he would try topical therapy.

## 2019-06-10 NOTE — PHYSICAL EXAM
[Sclera] : the sclera and conjunctiva were normal [General Appearance - Alert] : alert [PERRL With Normal Accommodation] : pupils were equal in size, round, and reactive to light [General Appearance - In No Acute Distress] : in no acute distress [Neck Appearance] : the appearance of the neck was normal [Extraocular Movements] : extraocular movements were intact [Neck Cervical Mass (___cm)] : no neck mass was observed [Thyroid Nodule] : there were no palpable thyroid nodules [Thyroid Diffuse Enlargement] : the thyroid was not enlarged [Jugular Venous Distention Increased] : there was no jugular-venous distention [Heart Rate And Rhythm] : heart rate was normal and rhythm regular [Auscultation Breath Sounds / Voice Sounds] : lungs were clear to auscultation bilaterally [Heart Sounds] : normal S1 and S2 [Heart Sounds Gallop] : no gallops [Murmurs] : no murmurs [Heart Sounds Pericardial Friction Rub] : no pericardial rub [Edema] : there was no peripheral edema [Full Pulse] : the pedal pulses are present [Bowel Sounds] : normal bowel sounds [Abdomen Soft] : soft [] : no hepato-splenomegaly [Abdomen Tenderness] : non-tender [Abdomen Mass (___ Cm)] : no abdominal mass palpated

## 2019-07-09 ENCOUNTER — APPOINTMENT (OUTPATIENT)
Dept: GASTROENTEROLOGY | Facility: CLINIC | Age: 24
End: 2019-07-09
Payer: COMMERCIAL

## 2019-07-09 VITALS
HEART RATE: 78 BPM | WEIGHT: 130 LBS | SYSTOLIC BLOOD PRESSURE: 127 MMHG | HEIGHT: 67 IN | DIASTOLIC BLOOD PRESSURE: 76 MMHG | BODY MASS INDEX: 20.4 KG/M2

## 2019-07-09 DIAGNOSIS — L29.9 PRURITUS, UNSPECIFIED: ICD-10-CM

## 2019-07-09 DIAGNOSIS — L29.0 PRURITUS ANI: ICD-10-CM

## 2019-07-09 DIAGNOSIS — R21 RASH AND OTHER NONSPECIFIC SKIN ERUPTION: ICD-10-CM

## 2019-07-09 PROCEDURE — 99213 OFFICE O/P EST LOW 20 MIN: CPT

## 2019-07-09 RX ORDER — PRAMOXINE HYDROCHLORIDE AND HYDROCORTISONE ACETATE 10; 25 MG/G; MG/G
2.5-1 CREAM TOPICAL
Qty: 1 | Refills: 3 | Status: ACTIVE | COMMUNITY
Start: 2019-07-09 | End: 1900-01-01

## 2019-07-14 NOTE — REVIEW OF SYSTEMS
[Feeling Tired] : feeling tired [Vomiting] : no vomiting [Abdominal Pain] : abdominal pain [As Noted in HPI] : as noted in HPI [Diarrhea] : diarrhea [Melena] : no melena [Negative] : Psychiatric

## 2019-07-14 NOTE — PHYSICAL EXAM
[General Appearance - Alert] : alert [General Appearance - In No Acute Distress] : in no acute distress [PERRL With Normal Accommodation] : pupils were equal in size, round, and reactive to light [Sclera] : the sclera and conjunctiva were normal [Extraocular Movements] : extraocular movements were intact [Neck Cervical Mass (___cm)] : no neck mass was observed [Neck Appearance] : the appearance of the neck was normal [Thyroid Diffuse Enlargement] : the thyroid was not enlarged [Thyroid Nodule] : there were no palpable thyroid nodules [Jugular Venous Distention Increased] : there was no jugular-venous distention [Auscultation Breath Sounds / Voice Sounds] : lungs were clear to auscultation bilaterally [Heart Rate And Rhythm] : heart rate was normal and rhythm regular [Heart Sounds Gallop] : no gallops [Murmurs] : no murmurs [Heart Sounds] : normal S1 and S2 [Heart Sounds Pericardial Friction Rub] : no pericardial rub [Edema] : there was no peripheral edema [Bowel Sounds] : normal bowel sounds [Full Pulse] : the pedal pulses are present [Abdomen Tenderness] : non-tender [] : no hepato-splenomegaly [Abdomen Soft] : soft [FreeTextEntry1] : some erythema on insude of buttocks, no anal lesions seen or palpated [Abdomen Mass (___ Cm)] : no abdominal mass palpated

## 2019-07-14 NOTE — HISTORY OF PRESENT ILLNESS
[de-identified] : 24yo male with colitis overall doing well \par He occasional mild symptoms with mild intermittent bleeding.  BM largely normal without pain..\par His recent increase in fatigue has improved.\par Patient notes some increased anal ithcing and discomfort\par He found new product and has been using it topically\par He has red area and concerned for lesion at anal area\par

## 2019-07-14 NOTE — ASSESSMENT
[FreeTextEntry1] : 24yo male with UC in remission with anal pruritus\par Pt to see dermatology\par Will start empiric perianal treatment and mild cream for rash around buttocks\par no evidence of fistula

## 2019-09-05 ENCOUNTER — APPOINTMENT (OUTPATIENT)
Dept: GASTROENTEROLOGY | Facility: CLINIC | Age: 24
End: 2019-09-05

## 2019-12-16 ENCOUNTER — APPOINTMENT (OUTPATIENT)
Dept: GASTROENTEROLOGY | Facility: CLINIC | Age: 24
End: 2019-12-16
Payer: COMMERCIAL

## 2019-12-16 VITALS
HEIGHT: 67 IN | WEIGHT: 146 LBS | DIASTOLIC BLOOD PRESSURE: 51 MMHG | BODY MASS INDEX: 22.91 KG/M2 | SYSTOLIC BLOOD PRESSURE: 133 MMHG | HEART RATE: 74 BPM

## 2019-12-16 PROCEDURE — 99213 OFFICE O/P EST LOW 20 MIN: CPT

## 2019-12-16 NOTE — REVIEW OF SYSTEMS
[Feeling Tired] : feeling tired [As Noted in HPI] : as noted in HPI [Abdominal Pain] : abdominal pain [Vomiting] : no vomiting [Diarrhea] : diarrhea [Melena] : no melena [Negative] : Psychiatric

## 2019-12-16 NOTE — PHYSICAL EXAM
[General Appearance - Alert] : alert [General Appearance - In No Acute Distress] : in no acute distress [Sclera] : the sclera and conjunctiva were normal [PERRL With Normal Accommodation] : pupils were equal in size, round, and reactive to light [Extraocular Movements] : extraocular movements were intact [Neck Appearance] : the appearance of the neck was normal [Neck Cervical Mass (___cm)] : no neck mass was observed [Jugular Venous Distention Increased] : there was no jugular-venous distention [Thyroid Diffuse Enlargement] : the thyroid was not enlarged [Thyroid Nodule] : there were no palpable thyroid nodules [Auscultation Breath Sounds / Voice Sounds] : lungs were clear to auscultation bilaterally [Heart Rate And Rhythm] : heart rate was normal and rhythm regular [Heart Sounds] : normal S1 and S2 [Murmurs] : no murmurs [Heart Sounds Gallop] : no gallops [Heart Sounds Pericardial Friction Rub] : no pericardial rub [Full Pulse] : the pedal pulses are present [Edema] : there was no peripheral edema [Bowel Sounds] : normal bowel sounds [Abdomen Soft] : soft [Abdomen Tenderness] : non-tender [] : no hepato-splenomegaly [Abdomen Mass (___ Cm)] : no abdominal mass palpated

## 2019-12-16 NOTE — HISTORY OF PRESENT ILLNESS
[de-identified] : 24yo male with colitis overall doing well \par He has been doing very well recently without substantial bleeding or diarrhea\par Just last night he had couple of drinks and then scant blood on paper\par None today\par anal pruritus resolved

## 2020-01-22 NOTE — PATIENT PROFILE ADULT. - PRESSURE ULCER(S)
You can access the FollowMyHealth Patient Portal offered by [facility] by registering at the following website: [email or web address]. By joining [employer]’s FollowMyHealth portal, you will also be able to view your health information using other applications (apps) compatible with our system.
no

## 2020-04-08 ENCOUNTER — APPOINTMENT (OUTPATIENT)
Dept: GASTROENTEROLOGY | Facility: CLINIC | Age: 25
End: 2020-04-08
Payer: COMMERCIAL

## 2020-04-08 PROCEDURE — G2012 BRIEF CHECK IN BY MD/QHP: CPT

## 2020-04-08 PROCEDURE — 99442: CPT

## 2020-05-01 ENCOUNTER — APPOINTMENT (OUTPATIENT)
Dept: GASTROENTEROLOGY | Facility: CLINIC | Age: 25
End: 2020-05-01
Payer: COMMERCIAL

## 2020-05-01 PROCEDURE — 99213 OFFICE O/P EST LOW 20 MIN: CPT | Mod: 95

## 2020-05-11 ENCOUNTER — APPOINTMENT (OUTPATIENT)
Dept: GASTROENTEROLOGY | Facility: CLINIC | Age: 25
End: 2020-05-11
Payer: COMMERCIAL

## 2020-05-11 PROCEDURE — 99213 OFFICE O/P EST LOW 20 MIN: CPT | Mod: 95

## 2020-05-14 ENCOUNTER — APPOINTMENT (OUTPATIENT)
Dept: GASTROENTEROLOGY | Facility: CLINIC | Age: 25
End: 2020-05-14

## 2020-05-19 ENCOUNTER — APPOINTMENT (OUTPATIENT)
Dept: DISASTER EMERGENCY | Facility: CLINIC | Age: 25
End: 2020-05-19

## 2020-05-20 LAB — SARS-COV-2 N GENE NPH QL NAA+PROBE: NOT DETECTED

## 2020-05-21 ENCOUNTER — RESULT REVIEW (OUTPATIENT)
Age: 25
End: 2020-05-21

## 2020-05-21 ENCOUNTER — APPOINTMENT (OUTPATIENT)
Dept: GASTROENTEROLOGY | Facility: AMBULATORY MEDICAL SERVICES | Age: 25
End: 2020-05-21
Payer: COMMERCIAL

## 2020-05-21 PROCEDURE — 45380 COLONOSCOPY AND BIOPSY: CPT

## 2020-05-26 ENCOUNTER — APPOINTMENT (OUTPATIENT)
Dept: GASTROENTEROLOGY | Facility: CLINIC | Age: 25
End: 2020-05-26
Payer: COMMERCIAL

## 2020-05-26 DIAGNOSIS — K64.4 RESIDUAL HEMORRHOIDAL SKIN TAGS: ICD-10-CM

## 2020-05-26 PROCEDURE — 99212 OFFICE O/P EST SF 10 MIN: CPT | Mod: 95

## 2020-05-27 PROBLEM — K64.4 EXTERNAL HEMORRHOID: Status: ACTIVE | Noted: 2020-05-27

## 2020-05-27 RX ORDER — HYDROCORTISONE ACETATE AND PRAMOXINE HYDROCHLORIDE 25; 10 MG/G; MG/G
2.5-1 CREAM TOPICAL
Qty: 1 | Refills: 5 | Status: ACTIVE | COMMUNITY
Start: 2020-05-27 | End: 1900-01-01

## 2020-06-16 ENCOUNTER — OUTPATIENT (OUTPATIENT)
Dept: OUTPATIENT SERVICES | Facility: HOSPITAL | Age: 25
LOS: 1 days | End: 2020-06-16
Payer: COMMERCIAL

## 2020-06-16 VITALS
DIASTOLIC BLOOD PRESSURE: 71 MMHG | HEART RATE: 97 BPM | RESPIRATION RATE: 18 BRPM | TEMPERATURE: 98 F | HEIGHT: 67 IN | WEIGHT: 128.75 LBS | SYSTOLIC BLOOD PRESSURE: 122 MMHG

## 2020-06-16 VITALS
SYSTOLIC BLOOD PRESSURE: 122 MMHG | DIASTOLIC BLOOD PRESSURE: 70 MMHG | RESPIRATION RATE: 17 BRPM | OXYGEN SATURATION: 100 % | HEART RATE: 97 BPM

## 2020-06-16 DIAGNOSIS — K51.00 ULCERATIVE (CHRONIC) PANCOLITIS WITHOUT COMPLICATIONS: ICD-10-CM

## 2020-06-16 DIAGNOSIS — K50.811 CROHN'S DISEASE OF BOTH SMALL AND LARGE INTESTINE WITH RECTAL BLEEDING: ICD-10-CM

## 2020-06-16 PROCEDURE — 96413 CHEMO IV INFUSION 1 HR: CPT

## 2020-06-16 RX ORDER — OMEPRAZOLE 10 MG/1
1 CAPSULE, DELAYED RELEASE ORAL
Qty: 0 | Refills: 0 | DISCHARGE

## 2020-06-16 RX ORDER — AZELASTINE 137 UG/1
2 SPRAY, METERED NASAL
Qty: 0 | Refills: 0 | DISCHARGE

## 2020-06-16 RX ORDER — USTEKINUMAB 45 MG/.5ML
390 INJECTION, SOLUTION SUBCUTANEOUS ONCE
Refills: 0 | Status: COMPLETED | OUTPATIENT
Start: 2020-06-16 | End: 2020-06-16

## 2020-06-16 RX ORDER — DIAZEPAM 5 MG
1 TABLET ORAL
Qty: 0 | Refills: 0 | DISCHARGE

## 2020-06-16 RX ORDER — HYDROCORTISONE/PRAMOXINE 2.5 %-1 %
1 CREAM WITH APPLICATOR RECTAL
Qty: 0 | Refills: 0 | DISCHARGE

## 2020-06-16 RX ORDER — MONTELUKAST 4 MG/1
1 TABLET, CHEWABLE ORAL
Qty: 0 | Refills: 0 | DISCHARGE

## 2020-06-16 RX ORDER — CHOLECALCIFEROL (VITAMIN D3) 125 MCG
1 CAPSULE ORAL
Qty: 0 | Refills: 0 | DISCHARGE

## 2020-06-16 RX ADMIN — USTEKINUMAB 250 MILLIGRAM(S): 45 INJECTION, SOLUTION SUBCUTANEOUS at 12:45

## 2020-06-16 RX ADMIN — USTEKINUMAB 390 MILLIGRAM(S): 45 INJECTION, SOLUTION SUBCUTANEOUS at 14:09

## 2020-06-17 DIAGNOSIS — K51.00 ULCERATIVE (CHRONIC) PANCOLITIS WITHOUT COMPLICATIONS: ICD-10-CM

## 2020-06-22 ENCOUNTER — APPOINTMENT (OUTPATIENT)
Dept: GASTROENTEROLOGY | Facility: CLINIC | Age: 25
End: 2020-06-22

## 2020-07-21 PROBLEM — F41.9 ANXIETY DISORDER, UNSPECIFIED: Chronic | Status: ACTIVE | Noted: 2020-06-15

## 2020-07-21 PROBLEM — K51.90 ULCERATIVE COLITIS, UNSPECIFIED, WITHOUT COMPLICATIONS: Chronic | Status: ACTIVE | Noted: 2020-06-15

## 2020-07-21 PROBLEM — K21.9 GASTRO-ESOPHAGEAL REFLUX DISEASE WITHOUT ESOPHAGITIS: Chronic | Status: ACTIVE | Noted: 2020-06-15

## 2020-07-21 PROBLEM — K64.9 UNSPECIFIED HEMORRHOIDS: Chronic | Status: ACTIVE | Noted: 2020-06-15

## 2020-07-21 PROBLEM — K58.9 IRRITABLE BOWEL SYNDROME WITHOUT DIARRHEA: Chronic | Status: ACTIVE | Noted: 2020-06-15

## 2020-07-21 PROBLEM — F32.9 MAJOR DEPRESSIVE DISORDER, SINGLE EPISODE, UNSPECIFIED: Chronic | Status: ACTIVE | Noted: 2020-06-15

## 2020-07-24 ENCOUNTER — APPOINTMENT (OUTPATIENT)
Dept: GASTROENTEROLOGY | Facility: CLINIC | Age: 25
End: 2020-07-24
Payer: COMMERCIAL

## 2020-07-24 DIAGNOSIS — K51.014: ICD-10-CM

## 2020-07-24 DIAGNOSIS — L30.9 DERMATITIS, UNSPECIFIED: ICD-10-CM

## 2020-07-24 PROCEDURE — 99213 OFFICE O/P EST LOW 20 MIN: CPT | Mod: 95

## 2020-07-26 NOTE — HISTORY OF PRESENT ILLNESS
[Home] : at home, [unfilled] , at the time of the visit. [Medical Office: ([facility])___] : at the medical office located in  [Verbal consent obtained from patient] : the patient, [unfilled] [de-identified] : 23yo male with colitis on stelara since mid june\par \par doing well overall with stomach symptoms\par off prednisone for few weeks\par He is having some skin issues, with flaky itchy - ?eczema\par He is unclear if some stelara side effects or due to preexisting issues

## 2020-07-26 NOTE — ASSESSMENT
[FreeTextEntry1] : 25yo male with IBD in remission on Stelara\par some eczema but unclear if due to Stelara\par continue stelara\par \par zyrtec and hydrocortisone cream\par

## 2020-10-08 ENCOUNTER — APPOINTMENT (OUTPATIENT)
Dept: GASTROENTEROLOGY | Facility: CLINIC | Age: 25
End: 2020-10-08

## 2020-10-23 ENCOUNTER — APPOINTMENT (OUTPATIENT)
Dept: GASTROENTEROLOGY | Facility: CLINIC | Age: 25
End: 2020-10-23
Payer: COMMERCIAL

## 2020-10-23 VITALS
SYSTOLIC BLOOD PRESSURE: 117 MMHG | BODY MASS INDEX: 21.97 KG/M2 | DIASTOLIC BLOOD PRESSURE: 72 MMHG | HEIGHT: 67 IN | WEIGHT: 140 LBS | HEART RATE: 60 BPM

## 2020-10-23 PROCEDURE — 99072 ADDL SUPL MATRL&STAF TM PHE: CPT

## 2020-10-23 PROCEDURE — 99213 OFFICE O/P EST LOW 20 MIN: CPT | Mod: 25

## 2020-10-25 NOTE — HISTORY OF PRESENT ILLNESS
[de-identified] : 23 yo male with hx of UC on Stelara doing overall well.  No longer having any abdominal pain, cramping.  No rectal bleeding.  No fevers, n/v.  Did see an allergist and reports being allergic to dust mites and pollen.

## 2020-10-25 NOTE — ASSESSMENT
[FreeTextEntry1] : 23 yo male with hx of UC on Stelara doing overall well. \par No complaints. \par Continue current tx. \par F/u 3 months. \par Discussed with Dr. Cheung.

## 2021-01-27 ENCOUNTER — APPOINTMENT (OUTPATIENT)
Dept: UROLOGY | Facility: CLINIC | Age: 26
End: 2021-01-27
Payer: COMMERCIAL

## 2021-01-27 VITALS
HEIGHT: 67 IN | HEART RATE: 81 BPM | WEIGHT: 138 LBS | OXYGEN SATURATION: 99 % | BODY MASS INDEX: 21.66 KG/M2 | DIASTOLIC BLOOD PRESSURE: 71 MMHG | SYSTOLIC BLOOD PRESSURE: 114 MMHG

## 2021-01-27 DIAGNOSIS — R39.198 OTHER DIFFICULTIES WITH MICTURITION: ICD-10-CM

## 2021-01-27 PROCEDURE — 99212 OFFICE O/P EST SF 10 MIN: CPT

## 2021-01-27 PROCEDURE — 99072 ADDL SUPL MATRL&STAF TM PHE: CPT

## 2021-01-27 NOTE — HISTORY OF PRESENT ILLNESS
[FreeTextEntry1] : This patient presents today for a checkup.  He has been doing well and has been diagnosed with ulcerative colitis.  He has occasional urinary issues but at the moment feels that he is doing well

## 2021-01-27 NOTE — REVIEW OF SYSTEMS
[Feeling Tired] : feeling tired [Dry Eyes] : dryness of the eyes [Earache] : earache [Sore Throat] : sore throat [Abdominal Pain] : abdominal pain [see HPI] : see HPI [Painful [place]] : painful [place] [Seen by urologist before (Name)  ___] : Preciously seen by a urologist: [unfilled] [Pain during urination] : pain during urination [Strain or push to urinate] : strain or push to urinate [Wait a long time to urinate] : waits a long time to urinate [Bladder fullness after urinating] : bladder fullness after urinating [Itching] : itching [Negative] : Heme/Lymph [FreeTextEntry3] : leak and dribbling of urine (enlarged prostate)

## 2021-01-28 LAB
APPEARANCE: CLEAR
BACTERIA: NEGATIVE
BILIRUBIN URINE: NEGATIVE
BLOOD URINE: NEGATIVE
COLOR: NORMAL
GLUCOSE QUALITATIVE U: NEGATIVE
HYALINE CASTS: 0 /LPF
KETONES URINE: NEGATIVE
LEUKOCYTE ESTERASE URINE: NEGATIVE
MICROSCOPIC-UA: NORMAL
NITRITE URINE: NEGATIVE
PH URINE: 6.5
PROTEIN URINE: NEGATIVE
RED BLOOD CELLS URINE: 1 /HPF
SPECIFIC GRAVITY URINE: 1.01
SQUAMOUS EPITHELIAL CELLS: 0 /HPF
UROBILINOGEN URINE: NORMAL
WHITE BLOOD CELLS URINE: 0 /HPF

## 2021-02-25 ENCOUNTER — APPOINTMENT (OUTPATIENT)
Dept: GASTROENTEROLOGY | Facility: CLINIC | Age: 26
End: 2021-02-25
Payer: COMMERCIAL

## 2021-02-25 VITALS
DIASTOLIC BLOOD PRESSURE: 79 MMHG | HEART RATE: 72 BPM | SYSTOLIC BLOOD PRESSURE: 134 MMHG | HEIGHT: 67 IN | WEIGHT: 129 LBS | BODY MASS INDEX: 20.25 KG/M2 | TEMPERATURE: 98 F

## 2021-02-25 DIAGNOSIS — K62.5 HEMORRHAGE OF ANUS AND RECTUM: ICD-10-CM

## 2021-02-25 DIAGNOSIS — Z09 ENCOUNTER FOR FOLLOW-UP EXAMINATION AFTER COMPLETED TREATMENT FOR CONDITIONS OTHER THAN MALIGNANT NEOPLASM: ICD-10-CM

## 2021-02-25 PROCEDURE — 99072 ADDL SUPL MATRL&STAF TM PHE: CPT

## 2021-02-25 PROCEDURE — 99214 OFFICE O/P EST MOD 30 MIN: CPT

## 2021-03-01 PROBLEM — Z09 FOLLOW UP: Status: ACTIVE | Noted: 2021-02-25

## 2021-03-01 PROBLEM — K62.5 RECTAL BLEEDING: Status: ACTIVE | Noted: 2019-05-03

## 2021-03-01 NOTE — PHYSICAL EXAM
[General Appearance - Alert] : alert [General Appearance - In No Acute Distress] : in no acute distress [Sclera] : the sclera and conjunctiva were normal [PERRL With Normal Accommodation] : pupils were equal in size, round, and reactive to light [Extraocular Movements] : extraocular movements were intact [Neck Appearance] : the appearance of the neck was normal [Neck Cervical Mass (___cm)] : no neck mass was observed [Jugular Venous Distention Increased] : there was no jugular-venous distention [Thyroid Diffuse Enlargement] : the thyroid was not enlarged [Thyroid Nodule] : there were no palpable thyroid nodules [Auscultation Breath Sounds / Voice Sounds] : lungs were clear to auscultation bilaterally [Heart Rate And Rhythm] : heart rate was normal and rhythm regular [Heart Sounds] : normal S1 and S2 [Heart Sounds Gallop] : no gallops [Murmurs] : no murmurs [Heart Sounds Pericardial Friction Rub] : no pericardial rub [Full Pulse] : the pedal pulses are present [Edema] : there was no peripheral edema [Bowel Sounds] : normal bowel sounds [Abdomen Soft] : soft [Abdomen Tenderness] : non-tender [] : no hepato-splenomegaly [Abdomen Mass (___ Cm)] : no abdominal mass palpated [FreeTextEntry1] : some erythema on insude of buttocks, no anal lesions seen or palpated

## 2021-03-01 NOTE — ASSESSMENT
[FreeTextEntry1] : 26yo male with colitis\par His dyspeptic symptoms are most likely due to post-COVID effects\par Will start iberogast empirically\par continue stelara\par  f/u 2 months - and will repeat colonoscopy after next visit

## 2021-03-01 NOTE — HISTORY OF PRESENT ILLNESS
[de-identified] : 24yo male with colitis maintained on Stelara\par He had COVID-19 last month and has had some residual GI issues\par He notes persistent dyspepsia and decreased appetite. He has been taking Pepcid with some relief. He is also with some increased BM frequency with formed stool and some diarrhea at times with scant bleeding.

## 2021-03-10 ENCOUNTER — APPOINTMENT (OUTPATIENT)
Dept: COLORECTAL SURGERY | Facility: CLINIC | Age: 26
End: 2021-03-10
Payer: COMMERCIAL

## 2021-03-10 VITALS
WEIGHT: 129 LBS | TEMPERATURE: 97.3 F | HEIGHT: 67 IN | BODY MASS INDEX: 20.25 KG/M2 | SYSTOLIC BLOOD PRESSURE: 117 MMHG | RESPIRATION RATE: 16 BRPM | DIASTOLIC BLOOD PRESSURE: 80 MMHG | HEART RATE: 80 BPM

## 2021-03-10 DIAGNOSIS — R19.7 DIARRHEA, UNSPECIFIED: ICD-10-CM

## 2021-03-10 PROCEDURE — 46600 DIAGNOSTIC ANOSCOPY SPX: CPT

## 2021-03-10 PROCEDURE — 99072 ADDL SUPL MATRL&STAF TM PHE: CPT

## 2021-03-10 PROCEDURE — 99214 OFFICE O/P EST MOD 30 MIN: CPT | Mod: 25

## 2021-03-10 NOTE — ASSESSMENT
[FreeTextEntry1] : Mr. Peter presents to the office for followup of his UC and h/o left perianal abscess. Despite prior MRI findings, on examination today, there was no e/o an external fistulous opening or e/o ongoing infection within the left perianal skin. Anoscopy also demonstrated healthy anorectal mucosa without inflammation. I have reassured him of this, and recommended that should he notice active drainage or symptoms of a recurrent abscess, he should seek consultation immediately for operative EUA, abscess drainage and seton placement. He understands and is agreeable.\par \par 3/10/21 Yohan presents to the office for follow-up.  At his last office visit he appeared to have ulcerative colitis, but now that diagnosis has changed to Crohn's disease based on his most recent colonoscopy in June 2020.  He has been well controlled with Stelara, but 2-1/2 months ago suffered from COVID-19 and developed diarrhea.  Overall, the diarrhea is gradually improving though he is concerned that it has not completely resolved.  I discussed with him that I do not believe his symptoms are secondary to medication failure from the Stelara as symptoms appeared after denise coronavirus.  Anoscopy was unremarkable as well for proctitis.  I suspect he is suffering from postinfectious diarrhea and reassured him that it should gradually improve over time.  Recommended high-fiber diet with gradual reintroduction of insoluble fibers into his diet and ample water intake.  He is debating whether to proceed with colonoscopy to determine if there was any damage done to his GI tract from coronavirus.  As he is overall improving although slowly, I do not suspect further colonoscopy is going to elucidate enough information to alter any current management plans.  I also discussed with him proper toileting habits including to avoid sitting on the toilet for up to an hour which will predispose him to worsening hemorrhoidal disease and rectal swelling/pressure with the sensation of needing to constantly defecate.  At the end of our visit, all questions were answered to his satisfaction.

## 2021-03-10 NOTE — PHYSICAL EXAM
[Normal rectal exam] : exam was normal [Fistula] : no fistulas [Nonprolapsing] : a nonprolapsing (grade I) [Skin Tags] : there were no residual hemorrhoidal skin tags seen [Normal] : was normal [None] : there was no rectal mass  [Gross Blood] : no gross blood [No Rash or Lesion] : No rash or lesion [Alert] : alert [Oriented to Person] : oriented to person [Oriented to Place] : oriented to place [Oriented to Time] : oriented to time [Calm] : calm [de-identified] : No evidence of anorectal fistulous opening [de-identified] : No apparent distress [de-identified] : Normocephalic atraumatic [de-identified] : Moving all extremities x4 [de-identified] : A&O x 3

## 2021-04-26 ENCOUNTER — APPOINTMENT (OUTPATIENT)
Dept: GASTROENTEROLOGY | Facility: CLINIC | Age: 26
End: 2021-04-26
Payer: COMMERCIAL

## 2021-04-26 VITALS
SYSTOLIC BLOOD PRESSURE: 131 MMHG | HEART RATE: 81 BPM | HEIGHT: 67 IN | BODY MASS INDEX: 21.19 KG/M2 | TEMPERATURE: 98 F | DIASTOLIC BLOOD PRESSURE: 79 MMHG | WEIGHT: 135 LBS

## 2021-04-26 DIAGNOSIS — R11.2 NAUSEA WITH VOMITING, UNSPECIFIED: ICD-10-CM

## 2021-04-26 DIAGNOSIS — Z09 ENCOUNTER FOR FOLLOW-UP EXAMINATION AFTER COMPLETED TREATMENT FOR CONDITIONS OTHER THAN MALIGNANT NEOPLASM: ICD-10-CM

## 2021-04-26 DIAGNOSIS — R10.9 UNSPECIFIED ABDOMINAL PAIN: ICD-10-CM

## 2021-04-26 PROCEDURE — 99213 OFFICE O/P EST LOW 20 MIN: CPT

## 2021-04-26 PROCEDURE — 99072 ADDL SUPL MATRL&STAF TM PHE: CPT

## 2021-04-26 NOTE — ASSESSMENT
[FreeTextEntry1] : 24yo male with crohns\par stable on tapering steroids and stelara\par \par he is going to [place] and having a colonoscopy when he returns

## 2021-04-26 NOTE — HISTORY OF PRESENT ILLNESS
[de-identified] : 25oy male with Crohn's colitis\par \par He is overall feeling fairly well. He is on tapering steroids for ?mild flare of his disease set off by COVID-19 infection. \par He has largely normal BM with some rare abdominal discomfort. Energy level is very good on prednisone. Issues with nausea are improving.

## 2021-04-26 NOTE — PHYSICAL EXAM
[General Appearance - Alert] : alert [General Appearance - In No Acute Distress] : in no acute distress [Neck Appearance] : the appearance of the neck was normal [Neck Cervical Mass (___cm)] : no neck mass was observed [Jugular Venous Distention Increased] : there was no jugular-venous distention [Thyroid Diffuse Enlargement] : the thyroid was not enlarged [Thyroid Nodule] : there were no palpable thyroid nodules [Auscultation Breath Sounds / Voice Sounds] : lungs were clear to auscultation bilaterally [Heart Rate And Rhythm] : heart rate was normal and rhythm regular [Heart Sounds] : normal S1 and S2 [Heart Sounds Gallop] : no gallops [Murmurs] : no murmurs [Heart Sounds Pericardial Friction Rub] : no pericardial rub [Full Pulse] : the pedal pulses are present [Edema] : there was no peripheral edema [Bowel Sounds] : normal bowel sounds [Abdomen Soft] : soft [Abdomen Tenderness] : non-tender [] : no hepato-splenomegaly [Abdomen Mass (___ Cm)] : no abdominal mass palpated [FreeTextEntry1] : some erythema on insude of buttocks, no anal lesions seen or palpated [Abnormal Walk] : normal gait [Nail Clubbing] : no clubbing  or cyanosis of the fingernails [Musculoskeletal - Swelling] : no joint swelling seen [Motor Tone] : muscle strength and tone were normal [Oriented To Time, Place, And Person] : oriented to person, place, and time [Impaired Insight] : insight and judgment were intact [Affect] : the affect was normal

## 2021-05-16 ENCOUNTER — APPOINTMENT (OUTPATIENT)
Dept: DISASTER EMERGENCY | Facility: CLINIC | Age: 26
End: 2021-05-16

## 2021-05-16 DIAGNOSIS — Z01.818 ENCOUNTER FOR OTHER PREPROCEDURAL EXAMINATION: ICD-10-CM

## 2021-05-16 LAB — SARS-COV-2 N GENE NPH QL NAA+PROBE: NOT DETECTED

## 2021-05-19 ENCOUNTER — RESULT REVIEW (OUTPATIENT)
Age: 26
End: 2021-05-19

## 2021-05-19 ENCOUNTER — APPOINTMENT (OUTPATIENT)
Dept: GASTROENTEROLOGY | Facility: AMBULATORY MEDICAL SERVICES | Age: 26
End: 2021-05-19
Payer: COMMERCIAL

## 2021-05-19 PROCEDURE — 45380 COLONOSCOPY AND BIOPSY: CPT

## 2021-06-21 ENCOUNTER — APPOINTMENT (OUTPATIENT)
Dept: GASTROENTEROLOGY | Facility: CLINIC | Age: 26
End: 2021-06-21
Payer: COMMERCIAL

## 2021-06-21 VITALS
BODY MASS INDEX: 22.13 KG/M2 | HEIGHT: 67 IN | WEIGHT: 141 LBS | SYSTOLIC BLOOD PRESSURE: 126 MMHG | HEART RATE: 65 BPM | DIASTOLIC BLOOD PRESSURE: 80 MMHG

## 2021-06-21 DIAGNOSIS — K50.811 CROHN'S DISEASE OF BOTH SMALL AND LARGE INTESTINE WITH RECTAL BLEEDING: ICD-10-CM

## 2021-06-21 DIAGNOSIS — Z09 ENCOUNTER FOR FOLLOW-UP EXAMINATION AFTER COMPLETED TREATMENT FOR CONDITIONS OTHER THAN MALIGNANT NEOPLASM: ICD-10-CM

## 2021-06-21 PROCEDURE — 99072 ADDL SUPL MATRL&STAF TM PHE: CPT

## 2021-06-21 PROCEDURE — 99213 OFFICE O/P EST LOW 20 MIN: CPT

## 2021-06-21 NOTE — ASSESSMENT
[FreeTextEntry1] : 24yo male with crohns\par Continue stelara\par he had brief flare and increased symptoms after stelara but now improved\par reviewed colonoscopy findings with him without significant active inflammation

## 2021-06-21 NOTE — HISTORY OF PRESENT ILLNESS
[de-identified] : 24yo male with Crohn's disease on Stelara\par \par He is in remission and doing well. No significant pain and normal BM. He is off steroids after flare and COVID-19 infection

## 2021-06-28 ENCOUNTER — APPOINTMENT (OUTPATIENT)
Dept: UROLOGY | Facility: CLINIC | Age: 26
End: 2021-06-28

## 2021-07-06 ENCOUNTER — APPOINTMENT (OUTPATIENT)
Dept: UROLOGY | Facility: CLINIC | Age: 26
End: 2021-07-06

## 2021-07-09 ENCOUNTER — APPOINTMENT (OUTPATIENT)
Dept: UROLOGY | Facility: CLINIC | Age: 26
End: 2021-07-09

## 2021-08-10 NOTE — ED PROVIDER NOTE - PMH
Continue current management. <<----- Click to add NO pertinent Past Medical History No pertinent past medical history

## 2021-10-04 ENCOUNTER — APPOINTMENT (OUTPATIENT)
Dept: DERMATOLOGY | Facility: CLINIC | Age: 26
End: 2021-10-04

## 2022-02-24 ENCOUNTER — RX RENEWAL (OUTPATIENT)
Age: 27
End: 2022-02-24

## 2022-04-12 NOTE — CONSULT NOTE ADULT - PROBLEM SELECTOR RECOMMENDATION 4
Try immodium once daily before breakfast.  Okay to add an additional dose before lunch or dinner if it helps.  Classical stretch on PBS or google Sit and Be Fit exercises.   pain meds as needed  bentyl 20mg po tid - had been using librax at home

## 2022-06-10 NOTE — ED ADULT TRIAGE NOTE - CCCP TRG CHIEF CMPLNT
rectal bleeding
pt presents to the ED with SOB since yesterday. Pt with hx of pneumothorax and copd. Pt was found to be hypoxic at dr's office and sent here for further eval of SOB and hypoxia. Pt is awake and alert, following commands appropriately with equal respirations. Pt placed on 2L NC upon arrival with SPO2 96%. Pt denies any chest pain. Pt given call bell and safety is maintained.

## 2022-09-15 NOTE — CONSULT NOTE ADULT - CONSULT REASON
Anal pain, ? fissure Complex Repair And Flap Additional Text (Will Appearing After The Standard Complex Repair Text): The complex repair was not sufficient to completely close the primary defect. The remaining additional defect was repaired with the flap mentioned below.

## 2023-05-18 RX ORDER — MESALAMINE 1000 MG/1
1000 SUPPOSITORY RECTAL
Qty: 30 | Refills: 3 | Status: ACTIVE | COMMUNITY
Start: 2020-04-04

## 2023-05-18 RX ORDER — BUDESONIDE 3 MG/1
3 CAPSULE, COATED PELLETS ORAL
Qty: 90 | Refills: 0 | Status: ACTIVE | COMMUNITY
Start: 2023-05-18 | End: 1900-01-01

## 2023-05-19 RX ORDER — BUDESONIDE 9 MG/1
9 TABLET, EXTENDED RELEASE ORAL DAILY
Qty: 30 | Refills: 3 | Status: ACTIVE | COMMUNITY
Start: 2017-05-16

## 2023-05-22 ENCOUNTER — INPATIENT (INPATIENT)
Facility: HOSPITAL | Age: 28
LOS: 9 days | Discharge: HOME | DRG: 385 | End: 2023-06-01
Attending: FAMILY MEDICINE | Admitting: INTERNAL MEDICINE
Payer: COMMERCIAL

## 2023-05-22 VITALS — WEIGHT: 134.92 LBS | HEIGHT: 67 IN

## 2023-05-22 DIAGNOSIS — K50.90 CROHN'S DISEASE, UNSPECIFIED, WITHOUT COMPLICATIONS: ICD-10-CM

## 2023-05-22 LAB
ALBUMIN SERPL ELPH-MCNC: 3.4 G/DL — SIGNIFICANT CHANGE UP (ref 3.3–5)
ALP SERPL-CCNC: 70 U/L — SIGNIFICANT CHANGE UP (ref 40–120)
ALT FLD-CCNC: 16 U/L — SIGNIFICANT CHANGE UP (ref 12–78)
ANION GAP SERPL CALC-SCNC: 5 MMOL/L — SIGNIFICANT CHANGE UP (ref 5–17)
APPEARANCE UR: CLEAR — SIGNIFICANT CHANGE UP
AST SERPL-CCNC: 10 U/L — LOW (ref 15–37)
BACTERIA # UR AUTO: ABNORMAL
BASOPHILS # BLD AUTO: 0 K/UL — SIGNIFICANT CHANGE UP (ref 0–0.2)
BASOPHILS NFR BLD AUTO: 0 % — SIGNIFICANT CHANGE UP (ref 0–2)
BILIRUB SERPL-MCNC: 0.2 MG/DL — SIGNIFICANT CHANGE UP (ref 0.2–1.2)
BILIRUB UR-MCNC: ABNORMAL
BUN SERPL-MCNC: 9 MG/DL — SIGNIFICANT CHANGE UP (ref 7–23)
CALCIUM SERPL-MCNC: 8.8 MG/DL — SIGNIFICANT CHANGE UP (ref 8.5–10.1)
CHLORIDE SERPL-SCNC: 102 MMOL/L — SIGNIFICANT CHANGE UP (ref 96–108)
CO2 SERPL-SCNC: 31 MMOL/L — SIGNIFICANT CHANGE UP (ref 22–31)
COLOR SPEC: YELLOW — SIGNIFICANT CHANGE UP
COMMENT - URINE: SIGNIFICANT CHANGE UP
CREAT SERPL-MCNC: 0.89 MG/DL — SIGNIFICANT CHANGE UP (ref 0.5–1.3)
DIFF PNL FLD: ABNORMAL
EGFR: 120 ML/MIN/1.73M2 — SIGNIFICANT CHANGE UP
EOSINOPHIL # BLD AUTO: 0.77 K/UL — HIGH (ref 0–0.5)
EOSINOPHIL NFR BLD AUTO: 3 % — SIGNIFICANT CHANGE UP (ref 0–6)
EPI CELLS # UR: NEGATIVE — SIGNIFICANT CHANGE UP
ERYTHROCYTE [SEDIMENTATION RATE] IN BLOOD: 22 MM/HR — HIGH (ref 0–15)
GLUCOSE SERPL-MCNC: 100 MG/DL — HIGH (ref 70–99)
GLUCOSE UR QL: NEGATIVE — SIGNIFICANT CHANGE UP
HCT VFR BLD CALC: 45.4 % — SIGNIFICANT CHANGE UP (ref 39–50)
HGB BLD-MCNC: 15.8 G/DL — SIGNIFICANT CHANGE UP (ref 13–17)
KETONES UR-MCNC: ABNORMAL
LEUKOCYTE ESTERASE UR-ACNC: ABNORMAL
LIDOCAIN IGE QN: 52 U/L — LOW (ref 73–393)
LYMPHOCYTES # BLD AUTO: 1.29 K/UL — SIGNIFICANT CHANGE UP (ref 1–3.3)
LYMPHOCYTES # BLD AUTO: 5 % — LOW (ref 13–44)
MANUAL SMEAR VERIFICATION: SIGNIFICANT CHANGE UP
MCHC RBC-ENTMCNC: 32.5 PG — SIGNIFICANT CHANGE UP (ref 27–34)
MCHC RBC-ENTMCNC: 34.8 GM/DL — SIGNIFICANT CHANGE UP (ref 32–36)
MCV RBC AUTO: 93.4 FL — SIGNIFICANT CHANGE UP (ref 80–100)
MONOCYTES # BLD AUTO: 2.84 K/UL — HIGH (ref 0–0.9)
MONOCYTES NFR BLD AUTO: 11 % — SIGNIFICANT CHANGE UP (ref 2–14)
NEUTROPHILS # BLD AUTO: 20.62 K/UL — HIGH (ref 1.8–7.4)
NEUTROPHILS NFR BLD AUTO: 80 % — HIGH (ref 43–77)
NITRITE UR-MCNC: NEGATIVE — SIGNIFICANT CHANGE UP
NRBC # BLD: 0 /100 — SIGNIFICANT CHANGE UP (ref 0–0)
NRBC # BLD: SIGNIFICANT CHANGE UP /100 WBCS (ref 0–0)
PH UR: 6.5 — SIGNIFICANT CHANGE UP (ref 5–8)
PLAT MORPH BLD: NORMAL — SIGNIFICANT CHANGE UP
PLATELET # BLD AUTO: 349 K/UL — SIGNIFICANT CHANGE UP (ref 150–400)
POTASSIUM SERPL-MCNC: 3.7 MMOL/L — SIGNIFICANT CHANGE UP (ref 3.5–5.3)
POTASSIUM SERPL-SCNC: 3.7 MMOL/L — SIGNIFICANT CHANGE UP (ref 3.5–5.3)
PROT SERPL-MCNC: 7.8 GM/DL — SIGNIFICANT CHANGE UP (ref 6–8.3)
PROT UR-MCNC: 30 MG/DL
RBC # BLD: 4.86 M/UL — SIGNIFICANT CHANGE UP (ref 4.2–5.8)
RBC # FLD: 11.5 % — SIGNIFICANT CHANGE UP (ref 10.3–14.5)
RBC BLD AUTO: NORMAL — SIGNIFICANT CHANGE UP
RBC CASTS # UR COMP ASSIST: SIGNIFICANT CHANGE UP /HPF (ref 0–4)
SODIUM SERPL-SCNC: 138 MMOL/L — SIGNIFICANT CHANGE UP (ref 135–145)
SP GR SPEC: 1.01 — SIGNIFICANT CHANGE UP (ref 1.01–1.02)
UROBILINOGEN FLD QL: 1
VARIANT LYMPHS # BLD: 1 % — SIGNIFICANT CHANGE UP (ref 0–6)
WBC # BLD: 25.78 K/UL — HIGH (ref 3.8–10.5)
WBC # FLD AUTO: 25.78 K/UL — HIGH (ref 3.8–10.5)
WBC UR QL: SIGNIFICANT CHANGE UP /HPF (ref 0–5)

## 2023-05-22 PROCEDURE — 82306 VITAMIN D 25 HYDROXY: CPT

## 2023-05-22 PROCEDURE — 80048 BASIC METABOLIC PNL TOTAL CA: CPT

## 2023-05-22 PROCEDURE — 84100 ASSAY OF PHOSPHORUS: CPT

## 2023-05-22 PROCEDURE — 74177 CT ABD & PELVIS W/CONTRAST: CPT | Mod: 26,MA

## 2023-05-22 PROCEDURE — 82728 ASSAY OF FERRITIN: CPT

## 2023-05-22 PROCEDURE — 87507 IADNA-DNA/RNA PROBE TQ 12-25: CPT

## 2023-05-22 PROCEDURE — 36415 COLL VENOUS BLD VENIPUNCTURE: CPT

## 2023-05-22 PROCEDURE — 83993 ASSAY FOR CALPROTECTIN FECAL: CPT

## 2023-05-22 PROCEDURE — 85025 COMPLETE CBC W/AUTO DIFF WBC: CPT

## 2023-05-22 PROCEDURE — 99285 EMERGENCY DEPT VISIT HI MDM: CPT

## 2023-05-22 PROCEDURE — 82607 VITAMIN B-12: CPT

## 2023-05-22 PROCEDURE — 83540 ASSAY OF IRON: CPT

## 2023-05-22 PROCEDURE — 85652 RBC SED RATE AUTOMATED: CPT

## 2023-05-22 PROCEDURE — 80053 COMPREHEN METABOLIC PANEL: CPT

## 2023-05-22 PROCEDURE — 85027 COMPLETE CBC AUTOMATED: CPT

## 2023-05-22 PROCEDURE — 83550 IRON BINDING TEST: CPT

## 2023-05-22 PROCEDURE — 82962 GLUCOSE BLOOD TEST: CPT

## 2023-05-22 PROCEDURE — 87493 C DIFF AMPLIFIED PROBE: CPT

## 2023-05-22 PROCEDURE — 84443 ASSAY THYROID STIM HORMONE: CPT

## 2023-05-22 PROCEDURE — 99223 1ST HOSP IP/OBS HIGH 75: CPT

## 2023-05-22 PROCEDURE — 83735 ASSAY OF MAGNESIUM: CPT

## 2023-05-22 PROCEDURE — 82746 ASSAY OF FOLIC ACID SERUM: CPT

## 2023-05-22 PROCEDURE — 86140 C-REACTIVE PROTEIN: CPT

## 2023-05-22 PROCEDURE — 74177 CT ABD & PELVIS W/CONTRAST: CPT

## 2023-05-22 PROCEDURE — 99222 1ST HOSP IP/OBS MODERATE 55: CPT

## 2023-05-22 RX ORDER — SODIUM CHLORIDE 9 MG/ML
1000 INJECTION INTRAMUSCULAR; INTRAVENOUS; SUBCUTANEOUS ONCE
Refills: 0 | Status: COMPLETED | OUTPATIENT
Start: 2023-05-22 | End: 2023-05-22

## 2023-05-22 RX ORDER — ACETAMINOPHEN 500 MG
1000 TABLET ORAL ONCE
Refills: 0 | Status: COMPLETED | OUTPATIENT
Start: 2023-05-22 | End: 2023-05-22

## 2023-05-22 RX ORDER — DICYCLOMINE HYDROCHLORIDE 20 MG/1
20 TABLET ORAL
Qty: 5 | Refills: 0 | Status: ACTIVE | COMMUNITY

## 2023-05-22 RX ORDER — SODIUM CHLORIDE 9 MG/ML
1000 INJECTION INTRAMUSCULAR; INTRAVENOUS; SUBCUTANEOUS
Refills: 0 | Status: DISCONTINUED | OUTPATIENT
Start: 2023-05-22 | End: 2023-05-23

## 2023-05-22 RX ORDER — GABAPENTIN 400 MG/1
100 CAPSULE ORAL EVERY 8 HOURS
Refills: 0 | Status: DISCONTINUED | OUTPATIENT
Start: 2023-05-22 | End: 2023-06-01

## 2023-05-22 RX ORDER — ACETAMINOPHEN 500 MG
650 TABLET ORAL EVERY 6 HOURS
Refills: 0 | Status: DISCONTINUED | OUTPATIENT
Start: 2023-05-22 | End: 2023-06-01

## 2023-05-22 RX ORDER — MORPHINE SULFATE 50 MG/1
4 CAPSULE, EXTENDED RELEASE ORAL ONCE
Refills: 0 | Status: DISCONTINUED | OUTPATIENT
Start: 2023-05-22 | End: 2023-05-22

## 2023-05-22 RX ORDER — LIDOCAINE 4 G/100G
1 CREAM TOPICAL DAILY
Refills: 0 | Status: DISCONTINUED | OUTPATIENT
Start: 2023-05-22 | End: 2023-06-01

## 2023-05-22 RX ORDER — MESALAMINE 400 MG
1000 TABLET, DELAYED RELEASE (ENTERIC COATED) ORAL AT BEDTIME
Refills: 0 | Status: DISCONTINUED | OUTPATIENT
Start: 2023-05-22 | End: 2023-06-01

## 2023-05-22 RX ORDER — ENOXAPARIN SODIUM 100 MG/ML
40 INJECTION SUBCUTANEOUS EVERY 24 HOURS
Refills: 0 | Status: DISCONTINUED | OUTPATIENT
Start: 2023-05-22 | End: 2023-06-01

## 2023-05-22 RX ORDER — MESALAMINE 400 MG
1 TABLET, DELAYED RELEASE (ENTERIC COATED) ORAL
Qty: 0 | Refills: 0 | DISCHARGE

## 2023-05-22 RX ADMIN — SODIUM CHLORIDE 50 MILLILITER(S): 9 INJECTION INTRAMUSCULAR; INTRAVENOUS; SUBCUTANEOUS at 23:11

## 2023-05-22 RX ADMIN — Medication 1000 MILLIGRAM(S): at 16:45

## 2023-05-22 RX ADMIN — Medication 400 MILLIGRAM(S): at 16:23

## 2023-05-22 RX ADMIN — SODIUM CHLORIDE 1000 MILLILITER(S): 9 INJECTION INTRAMUSCULAR; INTRAVENOUS; SUBCUTANEOUS at 13:24

## 2023-05-22 RX ADMIN — Medication 400 MILLIGRAM(S): at 23:11

## 2023-05-22 RX ADMIN — Medication 1000 MILLIGRAM(S): at 23:40

## 2023-05-22 NOTE — H&P ADULT - MUSCULOSKELETAL
no calf tenderness/normal gait/strength 5/5 bilateral upper extremities/strength 5/5 bilateral lower extremities

## 2023-05-22 NOTE — ED ADULT TRIAGE NOTE - CHIEF COMPLAINT QUOTE
Pt presents to ED c/o bright red rectal bleeding w/ abdominal pain x5days. Pt states "I have been having a lot of rectal bleeding for the past 5days and not really been eating. I have been on steroids for my Crohn's flare up for 5days but it is only getting worse. I am going to the bathroom like over 50x a day and feel very dehydrated my doctor told me to come in for IV steroids and fluids." Enodrses increased fatigue and lightheadedness. Denies use of blood thinners.

## 2023-05-22 NOTE — ED ADULT TRIAGE NOTE - NS ED TRIAGE AVPU SCALE
[facility] [place]  PROGRESS NOTE  Chief Complaint:   Patient presents with:  Breathing Problem: short of breath, wheezing      HPI:   This is a 43year old male with history of wheezing in the past presents complaining of having shortness of br vision or yellow sclerae. HEENT:  Denies hearing loss,or sore throat. See HPI  INTEGUMENTARY:  Denies rashes, itching, skin lesion, or excessive skin dryness.   CARDIOVASCULAR:  Denies chest pain, chest pressure, chest discomfort, palpitations, edema, dy gallops. EXTREMITIES: No edema, no cyanosis, no clubbing, FROM, 2+ dorsalis pedis pulses bilaterally. ABDOMEN: Soft, nondistended, nontender, bowel sounds normal in all 4 quadrants, no Masses, no hepatosplenomegaly.   SKIN: No rashes, no skin lesion, no b call with any side effects or complications from the treatments as a result of today.      Problem List:  Patient Active Problem List:     Viral warts     Asymptomatic varicose veins     Personal history of methicillin resistant Staphylococcus aureus     To Alert-The patient is alert, awake and responds to voice. The patient is oriented to time, place, and person. The triage nurse is able to obtain subjective information.

## 2023-05-22 NOTE — CONSULT NOTE ADULT - SUBJECTIVE AND OBJECTIVE BOX
Patient is a 27y old  Male who presents with a chief complaint of abdominal pain    HPI:  Formal note to follow. See GI plan of care note.      PAST MEDICAL & SURGICAL HISTORY:  Ulcerative proctitis with rectal bleeding      Anxiety disorder      Depression      Esophageal reflux      Hemorrhoids      IBS (irritable bowel syndrome)      Ulcerative colitis      No significant past surgical history          MEDICATIONS  (STANDING):    MEDICATIONS  (PRN):  acetaminophen     Tablet .. 650 milliGRAM(s) Oral every 6 hours PRN Mild Pain (1 - 3)      Allergies    No Known Allergies    Intolerances        SOCIAL HISTORY:    FAMILY HISTORY:  No pertinent family history in first degree relatives        REVIEW OF SYSTEMS:    CONSTITUTIONAL: No weakness, fevers or chills  EYES/ENT: No visual changes;  No vertigo or throat pain   NECK: No pain or stiffness  RESPIRATORY: No cough, wheezing, hemoptysis; No shortness of breath  CARDIOVASCULAR: No chest pain or palpitations  GASTROINTESTINAL: No abdominal or epigastric pain. No nausea, vomiting, or hematemesis; No diarrhea or constipation. No melena or hematochezia.  GENITOURINARY: No dysuria, frequency or hematuria  NEUROLOGICAL: No numbness or weakness  SKIN: No itching, burning, rashes, or lesions   PSYCH: Normal mood and affect  All other review of systems is negative unless indicated above.    Vital Signs Last 24 Hrs  T(C): 36.8 (22 May 2023 15:11), Max: 36.8 (22 May 2023 15:11)  T(F): 98.3 (22 May 2023 15:11), Max: 98.3 (22 May 2023 15:11)  HR: 91 (22 May 2023 15:11) (91 - 100)  BP: 122/63 (22 May 2023 15:11) (122/63 - 127/80)  BP(mean): 75 (22 May 2023 15:11) (75 - 94)  RR: 14 (22 May 2023 15:11) (14 - 18)  SpO2: 100% (22 May 2023 15:11) (99% - 100%)    Parameters below as of 22 May 2023 15:11  Patient On (Oxygen Delivery Method): room air        PHYSICAL EXAM:    Constitutional: No acute distress, well-developed, non-toxic appearing  HEENT: masked, good phonation, not icteric  Neck: supple, no lymphadenopathy  Respiratory: clear to ascultation bilaterally, no wheezing  Cardiovascular: S1 and S2, regular rate and rhythm, no murmurs rubs or gallops  Gastrointestinal: soft, non-tender, non-distended, +bowel sounds, no rebound or guarding, no surgical scars, no drains  Extremities: No peripheral edema, no cyanosis or clubbing  Vascular: 2+ peripheral pulses, no venous stasis  Neurological: A/O x 3, no focal deficits, no asterixis  Psychiatric: Normal mood, normal affect  Skin: No rashes, not jaundiced    LABS:                        15.8   25.78 )-----------( 349      ( 22 May 2023 13:22 )             45.4     05-22    138  |  102  |  9   ----------------------------<  100<H>  3.7   |  31  |  0.89    Ca    8.8      22 May 2023 13:22    TPro  7.8  /  Alb  3.4  /  TBili  0.2  /  DBili  x   /  AST  10<L>  /  ALT  16  /  AlkPhos  70  05-22      LIVER FUNCTIONS - ( 22 May 2023 13:22 )  Alb: 3.4 g/dL / Pro: 7.8 gm/dL / ALK PHOS: 70 U/L / ALT: 16 U/L / AST: 10 U/L / GGT: x             RADIOLOGY & ADDITIONAL STUDIES: Patient is a 27y old  Male who presents with a chief complaint of abdominal pain/loose stools    HPI:  This is a 27 year old male with history anxiety, GERD, UC with 7 day report of abdominal pain and high frequency loose stools. Per patient has been under increased stress as of late and 7 days ago had onset of predominantly LLQ abdominal pain radiating into flank followed with numerous loose stools and rectal pain and hemorrhoids. Does admit to hemorrhoidal bleeding/pain and using Proctofoam and Uceris without relief. Spoke with GI office- and prescribed mesalamine supps and Uceris. Patient endorses that pain worsened and was aggravated by any oral intake of food or water. Usually consumed about 2800calories daily now down to about 400calories and limites liquids due to cramping/pain. Denies hematemesis or melena. Denies vomiting. Admits to fatigue. Does feel hungry but scared to eat due to pain. Denies any sick contacts, recent travel, or consumption of undercooked raw meats. Does endorse history of biologics in the past. Remicade several years ago and self discontinued and more recently on Stelara in 2020 after thought to tb stress induced flare during height of Covid 2020. Stopped this on his own over a year ago. Has had flares in the past with good response to prednisone. Last colonoscopy within last 1-2 years per patient unremarkable. Currently with WBC 25 and CT showing pancolitis. IV tylenol with relief of pain. HH stable.      PAST MEDICAL & SURGICAL HISTORY:  Ulcerative proctitis with rectal bleeding      Anxiety disorder      Depression      Esophageal reflux      Hemorrhoids      IBS (irritable bowel syndrome)      Ulcerative colitis      No significant past surgical history    MEDICATIONS  (STANDING):    MEDICATIONS  (PRN):  acetaminophen     Tablet .. 650 milliGRAM(s) Oral every 6 hours PRN Mild Pain (1 - 3)      Allergies    No Known Allergies    Intolerances      SOCIAL HISTORY:    FAMILY HISTORY:  No pertinent family history in first degree relatives    REVIEW OF SYSTEMS:    CONSTITUTIONAL: No weakness, fevers or chills  EYES/ENT: No visual changes;  No vertigo or throat pain   NECK: No pain or stiffness  RESPIRATORY: No cough, wheezing, hemoptysis; No shortness of breath  CARDIOVASCULAR: No chest pain or palpitations  GASTROINTESTINAL: See HPI.  GENITOURINARY: No dysuria, frequency or hematuria  NEUROLOGICAL: No numbness or weakness  SKIN: No itching, burning, rashes, or lesions   PSYCH: Normal mood and affect  All other review of systems is negative unless indicated above.    Vital Signs Last 24 Hrs  T(C): 36.8 (22 May 2023 15:11), Max: 36.8 (22 May 2023 15:11)  T(F): 98.3 (22 May 2023 15:11), Max: 98.3 (22 May 2023 15:11)  HR: 91 (22 May 2023 15:11) (91 - 100)  BP: 122/63 (22 May 2023 15:11) (122/63 - 127/80)  BP(mean): 75 (22 May 2023 15:11) (75 - 94)  RR: 14 (22 May 2023 15:11) (14 - 18)  SpO2: 100% (22 May 2023 15:11) (99% - 100%)    Parameters below as of 22 May 2023 15:11  Patient On (Oxygen Delivery Method): room air        PHYSICAL EXAM:    Constitutional: No acute distress, well-developed, non-toxic appearing  HEENT: masked, good phonation, not icteric  Neck: supple, no lymphadenopathy  Respiratory: clear to ascultation bilaterally, no wheezing  Cardiovascular: S1 and S2, regular rate and rhythm, no murmurs rubs or gallops  Gastrointestinal: soft, LLQ tender, non-distended, +bowel sounds, no rebound or guarding, no surgical scars, no drains  Extremities: No peripheral edema, no cyanosis or clubbing  Vascular: 2+ peripheral pulses, no venous stasis  Neurological: A/O x 3, no focal deficits, no asterixis  Psychiatric: Normal mood, normal affect  Skin: No rashes, not jaundiced    LABS:                        15.8   25.78 )-----------( 349      ( 22 May 2023 13:22 )             45.4     05-22    138  |  102  |  9   ----------------------------<  100<H>  3.7   |  31  |  0.89    Ca    8.8      22 May 2023 13:22    TPro  7.8  /  Alb  3.4  /  TBili  0.2  /  DBili  x   /  AST  10<L>  /  ALT  16  /  AlkPhos  70  05-22      LIVER FUNCTIONS - ( 22 May 2023 13:22 )  Alb: 3.4 g/dL / Pro: 7.8 gm/dL / ALK PHOS: 70 U/L / ALT: 16 U/L / AST: 10 U/L / GGT: x             RADIOLOGY & ADDITIONAL STUDIES:  FINDINGS:  LOWER CHEST: Clear lung bases.    LIVER: Within normal limits.  BILE DUCTS: Normal caliber.  GALLBLADDER: Within normal limits.  SPLEEN: Within normal limits.  PANCREAS: Within normal limits.  ADRENALS: Within normal limits.  KIDNEYS/URETERS: Within normal limits.    BLADDER: Within normal limits.  REPRODUCTIVE ORGANS: Prostate within normal limits.    BOWEL: No bowel obstruction. Appendix is not visualized. Diffuse colonic   wall thickening and inflammation.  PERITONEUM: No ascites.  VESSELS: Within normal limits.  RETROPERITONEUM/LYMPH NODES: Mild mesenteric adenopathy, similar to prior.  ABDOMINAL WALL: Tiny fat-containing umbilical hernia.  BONES: Within normal limits.    IMPRESSION:  Pancolitis. Patient is a 27y old  Male who presents with a chief complaint of abdominal pain/loose stools    27 yaer old man with anxiety, reflux, ulcerative colitis admitted with abdominal pain and diarrhea.     Per patient has been under increased stress as of late and 7 days ago had onset of predominantly LLQ abdominal cramping radiating into flank followed with numerous loose stools and rectal pain. Pain is crampy in nature, sudden onset, 5/10, waxes and wanes, without known exacerbating or alleviating factors.  This is also associated with rectal bleeding without clots. Spoke with GI office- and prescribed mesalamine supps and Uceris. Patient endorses that pain/cramping worsened and was aggravated by any oral intake of food or water. Usually consumed about 2800calories daily now down to about 400calories and limites liquids due to cramping/pain. Denies hematemesis or melena. Denies vomiting. Admits to fatigue. Does feel hungry but scared to eat due to discomfortf. Denies any sick contacts, recent travel, or consumption of undercooked raw meats. Does endorse history of biologics in the past. Remicade several years ago and self discontinued and more recently on Stelara in 2020 after thought to tb stress induced flare during height of Covid 2020. Stopped this on his own over a year ago. Has had flares in the past with good response to prednisone. Last colonoscopy within last 1-2 years per patient unremarkable. Currently with WBC 25 and CT showing pancolitis. IV tylenol with relief of pain. HH stable.      PAST MEDICAL & SURGICAL HISTORY:  Ulcerative proctitis with rectal bleeding      Anxiety disorder      Depression      Esophageal reflux      Hemorrhoids      IBS (irritable bowel syndrome)      Ulcerative colitis      No significant past surgical history    MEDICATIONS  (STANDING):    MEDICATIONS  (PRN):  acetaminophen     Tablet .. 650 milliGRAM(s) Oral every 6 hours PRN Mild Pain (1 - 3)      Allergies    No Known Allergies    Intolerances      SOCIAL HISTORY:  no smoking, drinking or drugs    FAMILY HISTORY:  No colon or stomach cancer  no history of IBD    REVIEW OF SYSTEMS:    CONSTITUTIONAL: No weakness, fevers or chills  EYES/ENT: No visual changes;  No vertigo or throat pain   NECK: No pain or stiffness  RESPIRATORY: No cough, wheezing, hemoptysis; No shortness of breath  CARDIOVASCULAR: No chest pain or palpitations  GASTROINTESTINAL: See HPI.  GENITOURINARY: No dysuria, frequency or hematuria  NEUROLOGICAL: No numbness or weakness  SKIN: No itching, burning, rashes, or lesions   PSYCH: Normal mood and affect  All other review of systems is negative unless indicated above.    Vital Signs Last 24 Hrs  T(C): 36.8 (22 May 2023 15:11), Max: 36.8 (22 May 2023 15:11)  T(F): 98.3 (22 May 2023 15:11), Max: 98.3 (22 May 2023 15:11)  HR: 91 (22 May 2023 15:11) (91 - 100)  BP: 122/63 (22 May 2023 15:11) (122/63 - 127/80)  BP(mean): 75 (22 May 2023 15:11) (75 - 94)  RR: 14 (22 May 2023 15:11) (14 - 18)  SpO2: 100% (22 May 2023 15:11) (99% - 100%)    Parameters below as of 22 May 2023 15:11  Patient On (Oxygen Delivery Method): room air      PHYSICAL EXAM:    Constitutional: No acute distress, well-developed, non-toxic appearing  HEENT: unmasked, good phonation, not icteric  Neck: supple, no lymphadenopathy  Respiratory: clear to ascultation bilaterally, no wheezing  Cardiovascular: S1 and S2, regular rate and rhythm, no murmurs rubs or gallops  Gastrointestinal: soft, LLQ tender, non-distended, +bowel sounds, no rebound or guarding, no surgical scars, no drains  Extremities: No peripheral edema, no cyanosis or clubbing  Vascular: 2+ peripheral pulses, no venous stasis  Neurological: A/O x 3, no focal deficits, no asterixis  Psychiatric: Normal mood, normal affect  Skin: No rashes, not jaundiced    LABS:                        15.8   25.78 )-----------( 349      ( 22 May 2023 13:22 )             45.4     05-22    138  |  102  |  9   ----------------------------<  100<H>  3.7   |  31  |  0.89    Ca    8.8      22 May 2023 13:22    TPro  7.8  /  Alb  3.4  /  TBili  0.2  /  DBili  x   /  AST  10<L>  /  ALT  16  /  AlkPhos  70  05-22      LIVER FUNCTIONS - ( 22 May 2023 13:22 )  Alb: 3.4 g/dL / Pro: 7.8 gm/dL / ALK PHOS: 70 U/L / ALT: 16 U/L / AST: 10 U/L / GGT: x             RADIOLOGY & ADDITIONAL STUDIES:  FINDINGS:  LOWER CHEST: Clear lung bases.    LIVER: Within normal limits.  BILE DUCTS: Normal caliber.  GALLBLADDER: Within normal limits.  SPLEEN: Within normal limits.  PANCREAS: Within normal limits.  ADRENALS: Within normal limits.  KIDNEYS/URETERS: Within normal limits.    BLADDER: Within normal limits.  REPRODUCTIVE ORGANS: Prostate within normal limits.    BOWEL: No bowel obstruction. Appendix is not visualized. Diffuse colonic   wall thickening and inflammation.  PERITONEUM: No ascites.  VESSELS: Within normal limits.  RETROPERITONEUM/LYMPH NODES: Mild mesenteric adenopathy, similar to prior.  ABDOMINAL WALL: Tiny fat-containing umbilical hernia.  BONES: Within normal limits.    IMPRESSION:  Pancolitis.

## 2023-05-22 NOTE — H&P ADULT - NSICDXFAMILYHX_GEN_ALL_CORE_FT
FAMILY HISTORY:  No pertinent family history in first degree relatives     FAMILY HISTORY:  Father  Still living? Yes, Estimated age: Age Unknown  Family history of psoriasis in father, Age at diagnosis: Age Unknown  Paternal family history of hypertension, Age at diagnosis: Age Unknown

## 2023-05-22 NOTE — CONSULT NOTE ADULT - NS ATTEND AMEND GEN_ALL_CORE FT
27 year old man with UC formerly on biologics (remicade, stelara), now with pain and diarrhea with bleeding, colitis on CT.     R/o c diff and infectious diarrhea.   If negative then IV steroids.   Daily esr crp   Fecal calprotectin  DVT prophylaxis, chemical, as IBD high risk for clots despite mobility

## 2023-05-22 NOTE — ED ADULT NURSE NOTE - NSFALLHARMRISKINTERV_ED_ALL_ED

## 2023-05-22 NOTE — ED STATDOCS - OBJECTIVE STATEMENT
28 y/o M w/PMHx of Chrohn's, anxiety disorder, esophageal reflux, hemorrhoids, IBS presents to ED c/o Chrohn's flare up, on steroids for 5 days. pt endorses bright red rectal bleeding with abdominal cramping x5 days. NKDA. denies tobacco use.

## 2023-05-22 NOTE — H&P ADULT - NSHPPHYSICALEXAM_GEN_ALL_CORE
ICU Vital Signs Last 24 Hrs  T(C): 36.7 (22 May 2023 18:00), Max: 36.8 (22 May 2023 15:11)  T(F): 98.1 (22 May 2023 18:00), Max: 98.3 (22 May 2023 15:11)  HR: 78 (22 May 2023 18:00) (78 - 100)  BP: 118/61 (22 May 2023 18:00) (118/61 - 127/80)  BP(mean): 75 (22 May 2023 15:11) (75 - 94)    RR: 17 (22 May 2023 18:00) (14 - 18)  SpO2: 99% (22 May 2023 18:00) (99% - 100%)    O2 Parameters below as of 22 May 2023 18:00  Patient On (Oxygen Delivery Method): room air

## 2023-05-22 NOTE — PATIENT PROFILE ADULT - NSTRANSFERBELONGINGSDISPO_GEN_A_NUR
Urgent Care Note   Staff Urgent Care Physician        Assessment/Plan   +Strep, VEETID ordered. Follow up as needed.     1. Sore throat    2. Strep pharyngitis      Orders Placed This Encounter   • COVID-19 ANTIGEN Test ([place])     Order Specific Question:   Is the patient symptomatic per [facility]?     Answer:   Yes     Order Specific Question:   Days since symptom onset?     Answer:   5 or less     Order Specific Question:   Is PCR testing needed? (If Yes is selected, the PCR lab order will be placed not the Point of Care Antigen lab order.)     Answer:   No     Order Specific Question:   Who will be resulting the test?     Answer:   By Lab   • Streptococcus group A PCR   • COVID DIAGNOSTIC TESTING     Order Specific Question:   Is the patient symptomatic per CDC?     Answer:   Yes     Order Specific Question:   Days since symptom onset?     Answer:   5 or less     Order Specific Question:   Is PCR testing needed? (If Yes is selected, the PCR lab order will be placed not the Point of Care Antigen lab order.)     Answer:   No     Order Specific Question:   Who will be resulting the test?     Answer:   By Lab   • ondansetron (Zofran) 4 MG tablet     Sig: Take 1 tablet by mouth every 8 hours as needed for Nausea.     Dispense:  20 tablet     Refill:  0   • penicillin v potassium (VEETID) 500 MG tablet     Sig: Take 1 tablet by mouth 3 times daily for 10 days.     Dispense:  30 tablet     Refill:  0         Subjective/CC      Tessa Perez is a 25 year old female who presented requesting evaluation for sore throat, nausea, fevers, fatigue for the past 2-3 days.  Noticed that her tonsils are very big.  No known sick contacts or recent travel history.  Is around children.  No child is sick.  No history of strep.  Concern for strep.  No known COVID exposure.    Objective     Visit Vitals  BP (!) 144/98   Pulse 98   Temp 98 °F (36.7 °C)   Resp 18   LMP 02/06/2021   SpO2 98%         Physical Exam   Constitutional: She  is oriented to person, place, and time and well-developed, well-nourished, and in no distress.   HENT:   Head: Normocephalic and atraumatic.   Mouth/Throat: Uvula is midline and mucous membranes are normal. Posterior oropharyngeal erythema present. No oropharyngeal exudate, posterior oropharyngeal edema or tonsillar abscesses.   2+ Tonsillar hypertrophy   Neurological: She is alert and oriented to person, place, and time.   Psychiatric: Mood, memory, affect and judgment normal.          Past Medical History   Problem List:    Patient Active Problem List   Diagnosis   • History of recurrent miscarriages, not currently pregnant       Past History:   Allergies, Medications, Medical history, Surgical history, Social history and Family history were reviewed.   Past Medical History:   Diagnosis Date   • Negative History of Cancer    • Negative History of Deep vein thrombosis    • Negative History of Diabetes    • Negative History of Heart disease    • Negative History of Hypertension    • Negative History of Kidney disease    • PAST MEDICAL HISTORY 03/10/05    asthma   • PAST MEDICAL HISTORY 03/10/05    acid reflux     Past Surgical History:   Procedure Laterality Date   • Past surgical history  03/10/05    none noted     Social History     Tobacco Use   • Smoking status: Never Smoker   • Smokeless tobacco: Never Used   • Tobacco comment: 5/31/05  no smokers at home   Substance Use Topics   • Alcohol use: No     Family History   Problem Relation Age of Onset   • Allergies Mother         penicillin   • Arthritis Mother    • Thyroid Mother    • Arthritis Paternal Grandmother    • Hypertension Paternal Grandmother    • Arthritis Maternal Grandmother    • Asthma Maternal Grandmother    • Hypertension Maternal Grandmother    • Thyroid Maternal Grandmother    • Arthritis Maternal Grandfather    • Diabetes Maternal Grandfather    • Hypertension Maternal Grandfather    • Arthritis Paternal Grandfather    • Asthma Paternal  Grandfather    • Hypertension Paternal Grandfather    • Stroke Paternal Grandfather    • Migraine Sister        Follow up/Instructions       Return if symptoms worsen or fail to improve.    Instructions provided as documented in the after visit summary.    Kevin Bautista MD 11:32 AM               with patient

## 2023-05-22 NOTE — ED ADULT NURSE NOTE - NS ED PATIENT SAFETY CONCERN
----- Message from Rosalba Hernandez sent at 4/24/2018  7:40 AM CDT -----  Contact: Ms Kirby  Ms Kofi states need to speak with nurse.   Ms Kirby states  patient sick today will be bringing patient to see PCP.  Ms Kirby states want to know if patient will be able to still be seen by  doctor on Friday.   Please call Ms Kirby [phone]   No

## 2023-05-22 NOTE — H&P ADULT - NSHPSOCIALHISTORY_GEN_ALL_CORE
Pt  denies tobacco use. Pt works as a .  He lives with his wife and daughter.  He  denies tobacco/drug/ETOH abuse.

## 2023-05-22 NOTE — ED STATDOCS - PROGRESS NOTE DETAILS
LANE Hughes: 27 yr old make with hx of  Crohn's, anxiety disorder, esophageal reflux, hemorrhoids, IBS presents to ED c/o "Crohn's flare up", on steroids for 5 days. pt endorses bright red rectal bleeding with abdominal cramping x5 days, with no improvement.  No fever or chills   on exam abd diffuse tenderness   will check labs, and paged GI- Dr. Cheung LANE Hughes: Dr. Cheung office called and recommends to page NP EREN for consult. NP eren called and recommends ct  abd/ pelvis and then admit to medicine for iv steriod. Declan Hughes: Ct Reviewed. d/w with NP HAIDER. suggest pt to be admitted to medicine for IV steriods. d/w hospitalists. Pt agrees with plan.

## 2023-05-22 NOTE — CONSULT NOTE ADULT - ASSESSMENT
27 year old with history UC presenting with abdominal pain/frequent loose stools    CT pancolitis  Imp: rule out UC flare versus infectious etiology of pancolitis    Rec:  ::ESR/CRP and trend  ::Fecal calpro  ::GI PCR  ::IV hydration  ::Sips n chips, adv to clears if tolerated  ::Hold off on steroids until stool specimens result  ::Chemical VTE prophylaxis despite age and mobility due to high risk in IBD patients

## 2023-05-22 NOTE — ED STATDOCS - NSICDXPASTMEDICALHX_GEN_ALL_CORE_FT
PAST MEDICAL HISTORY:  Anxiety disorder     Depression     Esophageal reflux     Hemorrhoids     IBS (irritable bowel syndrome)     Ulcerative colitis     Ulcerative proctitis with rectal bleeding

## 2023-05-22 NOTE — ED STATDOCS - ATTENDING APP SHARED VISIT CONTRIBUTION OF CARE
I personally saw the patient with the CHIP, and completed the key components of the history and physical exam. I then discussed the management plan with the CHIP.

## 2023-05-22 NOTE — H&P ADULT - ASSESSMENT
Pt is admitted w/    Abdominal pain  GI Bleed  Pancolitis  Leukocytosis , inflammatory due to recent prednisone  Dehydration  Crohn's Disease  Eczema of left hand     - adm to medicine  - ESR is 22, awaiting CRP  - s/p 1 L NS in the ED, cont with 100ml/hr  - clear liq diet  - apprec GI consult,  - f/u stool studies, GI PCR, fecal calpro, trend ESR, CRP  - possible iv steroids   - Nutrition consult  - reassess left hand skin  - DVT proph: lovenox

## 2023-05-22 NOTE — ED ADULT NURSE NOTE - OBJECTIVE STATEMENT
Pediatric Electrophysiology Clinic Note     Referring Physician:  Mali Cronin MD  [address]  [place] [address]  [phone]     RE: Rachel Sanabria    2020     Problem List:    Patient Active Problem List   Diagnosis   • Trisomy 21   • Small perimembranous VSD (ventricular septal defect) with left-to-right shunt   • Obstructive sleep apnea   • Pulmonary hypertension         History:   This note is in reference to the recent Pediatric Electrophysiology Clinic visit on 10/1/19 at [facility] for Rachel Sanabria, the 5 week old female with the above problem list.  She was accompanied by her parents.  Rachel was being seen for an evaluation of ventricular septal defect.    She was born at 37 and 3/7 weeks gestation.  Birth weight was 3.11kg.  Her APGARS were 8 and 9.  Shortly after birth, her oxygen levels were 55%, and with oxygen, they improved to >95%.  She was transferred to the NICU for evaluation and found to have a ventricular septal defect and pulmonary hypertension.  In the NICU, she was doing well and had a sleep study that demonstrated obstructive sleep apnea.  She was sent home with oxygen.    Since she has been home, she has been doing well.  She has had no sweating with feeds.  She has been taking in about 650-750 mL / day of Similac 24kcal/oz (75%) or unfortified breastmilk (25%).  She has had oxygen 0.75LPM via nasal cannula when asleep and has done well.       A complete 10-point review of systems was otherwise negative.    Rachel's past medical history is summarized in the problem list above.    ALLERGIES:  No Known Allergies    She is taking no medications.    Social History     Patient does not qualify to have social determinant information on file (likely too young).   Social History Narrative    Born at 37 weeks and 3 days gestation, vaginally at 6lbs     Family lives in [facility].      There is no family history of sudden  unexplained deaths at a young age.      Physical Exam:    Pulse 147   Resp 50   Ht 20.08\" (51 cm)   Wt 3.81 kg (8 lb 6.4 oz)   BMI 14.65 kg/m²   BSA 0.22 m²     On physical exam, she was an alert and pleasant girl in no distress.   The conjunctiva and sclera were clear bilaterally.  The oral mucosa was pink and moist, and the neck was supple.  The lungs were clear to auscultation bilaterally.  The point of maximal impulse was normally placed, and there were no lifts, heaves, or thrills.  There was a normal S1 and a physiologically split S2.  There was a regular rate and rhythm, and there was a harsh II/VI holosystolic murmur at the left sternal border.  The abdomen was soft and nontender, with no hepatomegaly.  The extremities were warm and well-perfused, and there was no edema.  The strength and tone were normal.  There were no rashes.     Laboratory Studies:  A 12-lead electrocardiogram was performed, and I reviewed and interpreted it; it demonstrated sinus rhythm with a rate of 153 beats per minute.  The MD, QRS, and QTc intervals were normal.  There was possible left ventricular hypertrophy.  There was no evidence of pre-excitation.      I reviewed and interpreted the echocardiogram, which demonstrated the followin. Atrial septum: There is a patent foramen ovale.  2. Left ventricle: The cavity size is normal. Wall thickness is     normal. Systolic function is normal.  3. Ventricular septum: There is a small ventricular septal defect.     There is left-to-right shunt, with a peak gradient of 27-30mm     Hg.  4. Mild tricuspid regurgitation, with a peak gradient of 29mm Hg.     Assessment:    Rachel is a five-week-old infant with trisomy 21 and a small perimembranous ventricular septal defect.  The VSD appears smaller compared to previous study, and there is a chance that it will close spontaneously.  The pulmonary artery pressures do not seem as high based on today's TR gradient, and this will need  to be followed.     Recommendations:  1.  Continue current feeds with Similac 24kcal/oz and breast milk (estimating a total of 150kcal/kg/day).  2.  Discussed symptoms and signs of pulmonary overcirculation with family.  They will contact me if they notice fast or labored breathing, poor weight gain, sweating with feeds, or other concerns.  3.  I would like to see her back in clinic in January 2020.     Thank you for allowing me to participate in the care of Rachel.  Please do not hesitate to contact me if you have any questions.     Sincerely,    Demetrius Croft MD  Pediatric Cardiology / Electrophysiology     Pt presents to ED c/o bright red rectal bleeding w/ abdominal pain x5days. Pt states "I have been having a lot of rectal bleeding for the past 5days and not really been eating. I have been on steroids for my Crohn's flare up for 5days but it is only getting worse. I am going to the bathroom like over 50x a day and feel very dehydrated my doctor told me to come in for IV steroids and fluids." Endorses increased fatigue and lightheadedness. Denies use of blood thinners.

## 2023-05-23 LAB
ANION GAP SERPL CALC-SCNC: 8 MMOL/L — SIGNIFICANT CHANGE UP (ref 5–17)
BUN SERPL-MCNC: 7 MG/DL — SIGNIFICANT CHANGE UP (ref 7–23)
CALCIUM SERPL-MCNC: 8.4 MG/DL — LOW (ref 8.5–10.1)
CHLORIDE SERPL-SCNC: 108 MMOL/L — SIGNIFICANT CHANGE UP (ref 96–108)
CO2 SERPL-SCNC: 25 MMOL/L — SIGNIFICANT CHANGE UP (ref 22–31)
CREAT SERPL-MCNC: 0.71 MG/DL — SIGNIFICANT CHANGE UP (ref 0.5–1.3)
CRP SERPL-MCNC: 88 MG/L — HIGH
EGFR: 128 ML/MIN/1.73M2 — SIGNIFICANT CHANGE UP
GI PCR PANEL: SIGNIFICANT CHANGE UP
GLUCOSE BLDC GLUCOMTR-MCNC: 109 MG/DL — HIGH (ref 70–99)
GLUCOSE SERPL-MCNC: 86 MG/DL — SIGNIFICANT CHANGE UP (ref 70–99)
HCT VFR BLD CALC: 39 % — SIGNIFICANT CHANGE UP (ref 39–50)
HGB BLD-MCNC: 13.4 G/DL — SIGNIFICANT CHANGE UP (ref 13–17)
MCHC RBC-ENTMCNC: 32.3 PG — SIGNIFICANT CHANGE UP (ref 27–34)
MCHC RBC-ENTMCNC: 34.4 GM/DL — SIGNIFICANT CHANGE UP (ref 32–36)
MCV RBC AUTO: 94 FL — SIGNIFICANT CHANGE UP (ref 80–100)
PLATELET # BLD AUTO: 325 K/UL — SIGNIFICANT CHANGE UP (ref 150–400)
POTASSIUM SERPL-MCNC: 3.4 MMOL/L — LOW (ref 3.5–5.3)
POTASSIUM SERPL-SCNC: 3.4 MMOL/L — LOW (ref 3.5–5.3)
RBC # BLD: 4.15 M/UL — LOW (ref 4.2–5.8)
RBC # FLD: 11.6 % — SIGNIFICANT CHANGE UP (ref 10.3–14.5)
SODIUM SERPL-SCNC: 141 MMOL/L — SIGNIFICANT CHANGE UP (ref 135–145)
WBC # BLD: 23.18 K/UL — HIGH (ref 3.8–10.5)
WBC # FLD AUTO: 23.18 K/UL — HIGH (ref 3.8–10.5)

## 2023-05-23 PROCEDURE — 99232 SBSQ HOSP IP/OBS MODERATE 35: CPT

## 2023-05-23 RX ORDER — SODIUM CHLORIDE 9 MG/ML
1000 INJECTION INTRAMUSCULAR; INTRAVENOUS; SUBCUTANEOUS
Refills: 0 | Status: DISCONTINUED | OUTPATIENT
Start: 2023-05-23 | End: 2023-05-27

## 2023-05-23 RX ORDER — PETROLATUM,WHITE
1 JELLY (GRAM) TOPICAL
Refills: 0 | Status: DISCONTINUED | OUTPATIENT
Start: 2023-05-23 | End: 2023-06-01

## 2023-05-23 RX ORDER — PHENYLEPHRINE-SHARK LIVER OIL-MINERAL OIL-PETROLATUM RECTAL OINTMENT
1 OINTMENT (GRAM) RECTAL EVERY 6 HOURS
Refills: 0 | Status: DISCONTINUED | OUTPATIENT
Start: 2023-05-23 | End: 2023-06-01

## 2023-05-23 RX ORDER — HYDROCORTISONE 1 %
1 OINTMENT (GRAM) TOPICAL THREE TIMES A DAY
Refills: 0 | Status: DISCONTINUED | OUTPATIENT
Start: 2023-05-23 | End: 2023-06-01

## 2023-05-23 RX ORDER — POTASSIUM CHLORIDE 20 MEQ
40 PACKET (EA) ORAL ONCE
Refills: 0 | Status: COMPLETED | OUTPATIENT
Start: 2023-05-23 | End: 2023-05-23

## 2023-05-23 RX ORDER — ACETAMINOPHEN 500 MG
1000 TABLET ORAL ONCE
Refills: 0 | Status: COMPLETED | OUTPATIENT
Start: 2023-05-23 | End: 2023-05-23

## 2023-05-23 RX ORDER — ACETAMINOPHEN 500 MG
1000 TABLET ORAL ONCE
Refills: 0 | Status: COMPLETED | OUTPATIENT
Start: 2023-05-23 | End: 2023-05-24

## 2023-05-23 RX ORDER — SODIUM CHLORIDE 9 MG/ML
1000 INJECTION INTRAMUSCULAR; INTRAVENOUS; SUBCUTANEOUS
Refills: 0 | Status: COMPLETED | OUTPATIENT
Start: 2023-05-23 | End: 2023-05-23

## 2023-05-23 RX ORDER — BACITRACIN ZINC 500 UNIT/G
1 OINTMENT IN PACKET (EA) TOPICAL THREE TIMES A DAY
Refills: 0 | Status: DISCONTINUED | OUTPATIENT
Start: 2023-05-23 | End: 2023-06-01

## 2023-05-23 RX ORDER — HYDROCORTISONE 20 MG
100 TABLET ORAL THREE TIMES A DAY
Refills: 0 | Status: DISCONTINUED | OUTPATIENT
Start: 2023-05-23 | End: 2023-05-25

## 2023-05-23 RX ADMIN — Medication 10 MILLIGRAM(S): at 01:24

## 2023-05-23 RX ADMIN — SODIUM CHLORIDE 100 MILLILITER(S): 9 INJECTION INTRAMUSCULAR; INTRAVENOUS; SUBCUTANEOUS at 10:21

## 2023-05-23 RX ADMIN — Medication 1 APPLICATION(S): at 22:43

## 2023-05-23 RX ADMIN — Medication 40 MILLIEQUIVALENT(S): at 18:50

## 2023-05-23 RX ADMIN — PHENYLEPHRINE-SHARK LIVER OIL-MINERAL OIL-PETROLATUM RECTAL OINTMENT 1 APPLICATION(S): at 06:54

## 2023-05-23 RX ADMIN — Medication 1000 MILLIGRAM(S): at 07:15

## 2023-05-23 RX ADMIN — Medication 100 MILLIGRAM(S): at 12:03

## 2023-05-23 RX ADMIN — Medication 1 APPLICATION(S): at 12:09

## 2023-05-23 RX ADMIN — Medication 1 APPLICATION(S): at 12:07

## 2023-05-23 RX ADMIN — Medication 400 MILLIGRAM(S): at 13:49

## 2023-05-23 RX ADMIN — PHENYLEPHRINE-SHARK LIVER OIL-MINERAL OIL-PETROLATUM RECTAL OINTMENT 1 APPLICATION(S): at 12:09

## 2023-05-23 RX ADMIN — Medication 1 APPLICATION(S): at 18:44

## 2023-05-23 RX ADMIN — Medication 400 MILLIGRAM(S): at 06:54

## 2023-05-23 RX ADMIN — PHENYLEPHRINE-SHARK LIVER OIL-MINERAL OIL-PETROLATUM RECTAL OINTMENT 1 APPLICATION(S): at 18:43

## 2023-05-23 RX ADMIN — Medication 1 APPLICATION(S): at 01:24

## 2023-05-23 RX ADMIN — Medication 1 APPLICATION(S): at 18:45

## 2023-05-23 RX ADMIN — Medication 100 MILLIGRAM(S): at 18:45

## 2023-05-23 RX ADMIN — Medication 1000 MILLIGRAM(S): at 14:19

## 2023-05-23 RX ADMIN — PHENYLEPHRINE-SHARK LIVER OIL-MINERAL OIL-PETROLATUM RECTAL OINTMENT 1 APPLICATION(S): at 23:35

## 2023-05-23 NOTE — PROGRESS NOTE ADULT - SUBJECTIVE AND OBJECTIVE BOX
CC: Pt is a pleasant 26 y/o Male w/PMHx of Crohn's disease,  IBS, anxiety disorder, esophageal reflux, hemorrhoids who  presented to [place] ED c/o Crohn's flare.  Pt has been  on steroids for 5 days and reported left side and lower abdominal cramping  for five days with associated  bright red  blood per rectum.   He has been eating less as it causes more abd pain and stools.  He reports associated fatigue and  also light-headedness .  He  denies cpain/SOB,  no fever/chills, no nausea /vomiting,  no urinary or resp complaints.     5/23 - no cp palps sob; mild diffuse abdo pain. diarrhea     Vital Signs Last 24 Hrs  T(C): 36.8 (23 May 2023 19:08), Max: 37.1 (23 May 2023 00:52)  T(F): 98.2 (23 May 2023 19:08), Max: 98.8 (23 May 2023 16:01)  HR: 91 (23 May 2023 19:08) (91 - 94)  BP: 125/66 (23 May 2023 19:08) (125/66 - 134/68)  RR: 18 (23 May 2023 19:08) (18 - 18)  SpO2: 100% (23 May 2023 19:08) (97% - 100%)/RA   Constitutional: NAD, awake and alert  HEENT: PERR, EOMI  Neck: Soft and supple,  No JVD  Respiratory: Breath sounds are clear bilaterally, No wheezing, rales or rhonchi  Cardiovascular: S1 and S2, regular rate and rhythm, no Murmurs  Gastrointestinal: Bowel Sounds present, soft, nondistended, mild generalized tenderness, mostly central   Extremities: No peripheral edema  Vascular: 2+ peripheral pulses  Neurological: A/O x 3, no focal deficits  Musculoskeletal: 5/5 strength b/l upper and lower extremities  Skin: No rashes    MEDICATIONS:  MEDICATIONS  (STANDING):  AQUAPHOR (petrolatum Ointment) 1 Application(s) Topical two times a day  bacitracin   Ointment 1 Application(s) Topical three times a day  enoxaparin Injectable 40 milliGRAM(s) SubCutaneous every 24 hours  gabapentin 100 milliGRAM(s) Oral every 8 hours  hemorrhoidal Ointment 1 Application(s) Rectal every 6 hours  hydrocortisone 2.5% Rectal Cream 1 Application(s) Rectal three times a day  hydrocortisone sodium succinate Injectable 100 milliGRAM(s) IV Push three times a day  lidocaine   4% Patch 1 Patch Transdermal daily  mesalamine Suppository 1000 milliGRAM(s) Rectal at bedtime  sodium chloride 0.9%. 1000 milliLiter(s) (100 mL/Hr) IV Continuous <Continuous>      LABS: All Labs Reviewed:                        13.4   23.18 )-----------( 325      ( 23 May 2023 06:25 )             39.0   141  |  108  |  7   ----------------------------<  86  3.4<L>   |  25  |  0.71  Ca    8.4<L>      23 May 2023 06:25  TPro  7.8  /  Alb  3.4  /  TBili  0.2  /  DBili  x   /  AST  10<L>  /  ALT  16  /  AlkPhos  70  05-22    RADIOLOGY/EKG:  CT ABDO - shows pancolitis

## 2023-05-23 NOTE — PROGRESS NOTE ADULT - ASSESSMENT
27 year old with history UC presenting with abdominal pain/frequent loose stools    CT pancolitis  GIPCR negative with elevated ESR/CRP    Imp: UC flare    Rec:  ::Trend daily ESR/CRP  ::Start IV hydrocortisone 100mg Q8hr, will transition to oral steroid upon dc- prednisone 50 mg with 5mg weekly taper  ::Proctosol for hemorrhoidal pain  ::Clear liquids and adv as josse  ::Pain control  ::Chemical VTE prophylaxis despite age and mobility due to high risk in IBD patients

## 2023-05-23 NOTE — DIETITIAN INITIAL EVALUATION ADULT - ADD RECOMMEND
Advance diet to low fiber when medically feasible, and as tolerated.  MVI w/ minerals daily to ensure 100% RDA met.  Consider adding thiamine 100 mg daily 2/2 poor PO intake/ malnutrition.  Monitor PO intake, tolerance, labs and weight.  Advance diet to low fiber when medically feasible, and as tolerated.   If unable to take in PO within the next 24 hours, consider initiating PPN.   MVI w/ minerals daily to ensure 100% RDA met.    Consider adding thiamine 100 mg daily 2/2 poor PO intake/ malnutrition.    Monitor PO intake, tolerance, labs and weight.

## 2023-05-23 NOTE — PROGRESS NOTE ADULT - ASSESSMENT
Abdominal pain  GI Bleed  Pancolitis  Leukocytosis , inflammatory due to recent prednisone  Dehydration  Crohn's Disease  Eczema of left hand     - adm to medicine  - ESR is 22, awaiting CRP  - s/p 1 L NS in the ED, cont with 100ml/hr  - clear liq diet  - apprec GI consult,  -GI PCR is negative, can be on IV steroids   - Nutrition consult  - reassess left hand skin  - DVT proph: lovenox

## 2023-05-23 NOTE — PROGRESS NOTE ADULT - SUBJECTIVE AND OBJECTIVE BOX
Patient is a 27y old  Male who presents with a chief complaint of Crohn's disease without complication    Followup: pancolitis  At bedside, patient reported that loose stools continued with pain rectum/hemorrhoids and associated abdominal cramping. GIPCR negative. ESR/CRP elevated. Afebrile.    MEDICATIONS  (STANDING):  AQUAPHOR (petrolatum Ointment) 1 Application(s) Topical two times a day  bacitracin   Ointment 1 Application(s) Topical three times a day  enoxaparin Injectable 40 milliGRAM(s) SubCutaneous every 24 hours  gabapentin 100 milliGRAM(s) Oral every 8 hours  hemorrhoidal Ointment 1 Application(s) Rectal every 6 hours  hydrocortisone 2.5% Rectal Cream 1 Application(s) Rectal three times a day  hydrocortisone sodium succinate Injectable 100 milliGRAM(s) IV Push three times a day  lidocaine   4% Patch 1 Patch Transdermal daily  mesalamine Suppository 1000 milliGRAM(s) Rectal at bedtime  potassium chloride   Powder 40 milliEquivalent(s) Oral once    MEDICATIONS  (PRN):  acetaminophen     Tablet .. 650 milliGRAM(s) Oral every 6 hours PRN Mild Pain (1 - 3)    Vital Signs Last 24 Hrs  T(C): 36.5 (23 May 2023 11:33), Max: 37.1 (23 May 2023 00:52)  T(F): 97.7 (23 May 2023 11:33), Max: 98.7 (23 May 2023 00:52)  HR: 94 (23 May 2023 11:33) (78 - 94)  BP: 126/60 (23 May 2023 11:33) (118/61 - 134/68)  BP(mean): 75 (22 May 2023 15:11) (75 - 75)  RR: 18 (23 May 2023 11:33) (14 - 18)  SpO2: 98% (23 May 2023 11:33) (97% - 100%)    Parameters below as of 23 May 2023 11:33  Patient On (Oxygen Delivery Method): room air        PHYSICAL EXAM:    Constitutional: No acute distress, well-developed, non-toxic appearing  HEENT: masked, good phonation, not icteric  Neck: supple, no lymphadenopathy  Respiratory: clear to ascultation bilaterally, no wheezing  Cardiovascular: S1 and S2, regular rate and rhythm, no murmurs rubs or gallops  Gastrointestinal: soft, tender, non-distended, +bowel sounds, no rebound or guarding, no surgical scars, no drains  Extremities: No peripheral edema, no cyanosis or clubbing  Vascular: 2+ peripheral pulses, no venous stasis  Neurological: A/O x 3, no focal deficits, no asterixis  Psychiatric: Normal mood, normal affect  Skin: No rashes, not jaundiced    LABS:                        13.4   23.18 )-----------( 325      ( 23 May 2023 06:25 )             39.0     05-23    141  |  108  |  7   ----------------------------<  86  3.4<L>   |  25  |  0.71    Ca    8.4<L>      23 May 2023 06:25    TPro  7.8  /  Alb  3.4  /  TBili  0.2  /  DBili  x   /  AST  10<L>  /  ALT  16  /  AlkPhos  70  05-22      LIVER FUNCTIONS - ( 22 May 2023 13:22 )  Alb: 3.4 g/dL / Pro: 7.8 gm/dL / ALK PHOS: 70 U/L / ALT: 16 U/L / AST: 10 U/L / GGT: x             RADIOLOGY & ADDITIONAL STUDIES:

## 2023-05-23 NOTE — DIETITIAN INITIAL EVALUATION ADULT - NAME AND PHONE
Alfreda Garcia RDN, CDN, [facility]      [phone]   [email or web address]

## 2023-05-23 NOTE — DIETITIAN INITIAL EVALUATION ADULT - NSFNSGIASSESSMENTFT_GEN_A_CORE
Loose stools, pt does not want to drink the clears due to apprehension about pain, discomfort.  No n/v.

## 2023-05-23 NOTE — DIETITIAN INITIAL EVALUATION ADULT - OTHER INFO
Pt is a pleasant 28 y/o Male w/PMHx of Crohn's disease,  IBS, anxiety disorder, esophageal reflux, hemorrhoids who  presented to [facility] ED c/o Crohn's flare.  Pt has been  on steroids for 5 days and reported left side and lower abdominal cramping  for five days with associated  bright red  blood per rectum.   He has been eating less as it causes more abd pain and stools.  He reports associated fatigue and  also light-headedness .  He  denies cpain/SOB,  no fever/chills, no nausea /vomiting,  no urinary or resp complaints.  Pt is a pleasant 26 y/o Male w/PMHx of Crohn's disease,  IBS, anxiety disorder, esophageal reflux, hemorrhoids who  presented to [facility] ED c/o Crohn's flare.  Pt has been  on steroids for 5 days and reported left side and lower abdominal cramping  for five days with associated  bright red  blood per rectum.   He has been eating less as it causes more abd pain and stools.  He reports associated fatigue and  also light-headedness .  He  denies cpain/SOB,  no fever/chills, no nausea /vomiting,  no urinary or resp complaints.     Visited with pt at bedside, his wife was present.    Pt continues to have diarrhea and discomfort on eating, is afraid to eat despite advance to clear liquids  K 3.4 low, pt being given K in po form.  NFPE reveals muscle wasting, fat wasting   PO intake estimated < 50% ENN x one week  If pt not advanced in diet in 24 hours, PN may need to be considered   Recommendations to follow in Plan/Intervention

## 2023-05-23 NOTE — DIETITIAN INITIAL EVALUATION ADULT - ORAL INTAKE PTA/DIET HISTORY
Pt lives with wife, they shop and cook.  Pt was eating 2800 kcal a day up until 8 days ago, 400-1000 kcal for 1 week, minimal clears for past 2 days.

## 2023-05-23 NOTE — DIETITIAN INITIAL EVALUATION ADULT - ENERGY INTAKE
Universal Protocol/Time Out Physician Office Checklist       Date: 10/27/2020     Patient Name: Mary Hatfield    : 1967   MRN: [id number]       Procedure (as it appears on the consent form): colposcopy with possible biopsy and endocervical curettage      Check if completed:    [] Correct patient (entire first name, entire last name; date of birth confirmed)    [] Correct procedure    [] Completed Informed Consent reviewed and accurate    [] A urine pregnancy test was performed, if required per [facility] Policy.  The provider  was informed of the result prior to beginning the procedure.  The result was    _____________    [] Pertinent / relevant medical record detail reviewed (H&P, progress notes, etc); including  labs and pertinent imaging studies (all properly labeled)    [] Correct Site (i.e., left vs. right) / Correct Level (i.e., thoracic vs. lumbar). Verbal   confirmation of site, procedure, patient with either patient or family if patient cannot  provide confirmation   [] Right [] Left  [] Bilateral [] Site marking does not apply    [] Immediate availability of all necessary equipment    [] Confirm equipment sterilization by the packaging indicator strip and date or high level  disinfection by confirming the date of cleaning on the appropriate log sheet.      ______________________________   _______________________________             Verification by:       Verification by:   Signature of DO/MD/PA/KUNAL     Signature of Clinical Associate    Comments: ___________________________________________________________________________    ___________________________________________________________________________                Poor (<50%)

## 2023-05-23 NOTE — DIETITIAN INITIAL EVALUATION ADULT - PERTINENT MEDS FT
MEDICATIONS  (STANDING):  AQUAPHOR (petrolatum Ointment) 1 Application(s) Topical two times a day  bacitracin   Ointment 1 Application(s) Topical three times a day  enoxaparin Injectable 40 milliGRAM(s) SubCutaneous every 24 hours  gabapentin 100 milliGRAM(s) Oral every 8 hours  hemorrhoidal Ointment 1 Application(s) Rectal every 6 hours  hydrocortisone sodium succinate Injectable 100 milliGRAM(s) IV Push three times a day  lidocaine   4% Patch 1 Patch Transdermal daily  mesalamine Suppository 1000 milliGRAM(s) Rectal at bedtime  potassium chloride   Powder 40 milliEquivalent(s) Oral once    MEDICATIONS  (PRN):  acetaminophen     Tablet .. 650 milliGRAM(s) Oral every 6 hours PRN Mild Pain (1 - 3)

## 2023-05-24 LAB
ANION GAP SERPL CALC-SCNC: 7 MMOL/L — SIGNIFICANT CHANGE UP (ref 5–17)
BUN SERPL-MCNC: 8 MG/DL — SIGNIFICANT CHANGE UP (ref 7–23)
CALCIUM SERPL-MCNC: 8.3 MG/DL — LOW (ref 8.5–10.1)
CHLORIDE SERPL-SCNC: 106 MMOL/L — SIGNIFICANT CHANGE UP (ref 96–108)
CO2 SERPL-SCNC: 26 MMOL/L — SIGNIFICANT CHANGE UP (ref 22–31)
CREAT SERPL-MCNC: 0.67 MG/DL — SIGNIFICANT CHANGE UP (ref 0.5–1.3)
CRP SERPL-MCNC: 120 MG/L — HIGH
EGFR: 130 ML/MIN/1.73M2 — SIGNIFICANT CHANGE UP
ERYTHROCYTE [SEDIMENTATION RATE] IN BLOOD: 21 MM/HR — HIGH (ref 0–15)
GLUCOSE SERPL-MCNC: 140 MG/DL — HIGH (ref 70–99)
HCT VFR BLD CALC: 38.2 % — LOW (ref 39–50)
HGB BLD-MCNC: 13.3 G/DL — SIGNIFICANT CHANGE UP (ref 13–17)
MAGNESIUM SERPL-MCNC: 2.1 MG/DL — SIGNIFICANT CHANGE UP (ref 1.6–2.6)
MCHC RBC-ENTMCNC: 32.5 PG — SIGNIFICANT CHANGE UP (ref 27–34)
MCHC RBC-ENTMCNC: 34.8 GM/DL — SIGNIFICANT CHANGE UP (ref 32–36)
MCV RBC AUTO: 93.4 FL — SIGNIFICANT CHANGE UP (ref 80–100)
PLATELET # BLD AUTO: 323 K/UL — SIGNIFICANT CHANGE UP (ref 150–400)
POTASSIUM SERPL-MCNC: 4.2 MMOL/L — SIGNIFICANT CHANGE UP (ref 3.5–5.3)
POTASSIUM SERPL-SCNC: 4.2 MMOL/L — SIGNIFICANT CHANGE UP (ref 3.5–5.3)
RBC # BLD: 4.09 M/UL — LOW (ref 4.2–5.8)
RBC # FLD: 11.5 % — SIGNIFICANT CHANGE UP (ref 10.3–14.5)
SODIUM SERPL-SCNC: 139 MMOL/L — SIGNIFICANT CHANGE UP (ref 135–145)
WBC # BLD: 18.86 K/UL — HIGH (ref 3.8–10.5)
WBC # FLD AUTO: 18.86 K/UL — HIGH (ref 3.8–10.5)

## 2023-05-24 PROCEDURE — 99232 SBSQ HOSP IP/OBS MODERATE 35: CPT

## 2023-05-24 RX ORDER — ONDANSETRON 8 MG/1
4 TABLET, FILM COATED ORAL EVERY 8 HOURS
Refills: 0 | Status: DISCONTINUED | OUTPATIENT
Start: 2023-05-24 | End: 2023-06-01

## 2023-05-24 RX ADMIN — Medication 1 APPLICATION(S): at 09:49

## 2023-05-24 RX ADMIN — Medication 1 APPLICATION(S): at 09:42

## 2023-05-24 RX ADMIN — PHENYLEPHRINE-SHARK LIVER OIL-MINERAL OIL-PETROLATUM RECTAL OINTMENT 1 APPLICATION(S): at 09:42

## 2023-05-24 RX ADMIN — SODIUM CHLORIDE 100 MILLILITER(S): 9 INJECTION INTRAMUSCULAR; INTRAVENOUS; SUBCUTANEOUS at 17:32

## 2023-05-24 RX ADMIN — Medication 1 APPLICATION(S): at 21:07

## 2023-05-24 RX ADMIN — Medication 1 APPLICATION(S): at 16:08

## 2023-05-24 RX ADMIN — ONDANSETRON 4 MILLIGRAM(S): 8 TABLET, FILM COATED ORAL at 21:02

## 2023-05-24 RX ADMIN — PHENYLEPHRINE-SHARK LIVER OIL-MINERAL OIL-PETROLATUM RECTAL OINTMENT 1 APPLICATION(S): at 16:07

## 2023-05-24 RX ADMIN — Medication 100 MILLIGRAM(S): at 17:32

## 2023-05-24 RX ADMIN — Medication 100 MILLIGRAM(S): at 09:41

## 2023-05-24 RX ADMIN — Medication 400 MILLIGRAM(S): at 00:05

## 2023-05-24 RX ADMIN — Medication 1 APPLICATION(S): at 15:10

## 2023-05-24 RX ADMIN — PHENYLEPHRINE-SHARK LIVER OIL-MINERAL OIL-PETROLATUM RECTAL OINTMENT 1 APPLICATION(S): at 21:06

## 2023-05-24 RX ADMIN — Medication 1 APPLICATION(S): at 21:06

## 2023-05-24 RX ADMIN — Medication 1000 MILLIGRAM(S): at 00:30

## 2023-05-24 RX ADMIN — Medication 100 MILLIGRAM(S): at 02:01

## 2023-05-24 NOTE — PROGRESS NOTE ADULT - SUBJECTIVE AND OBJECTIVE BOX
CC: Pt is a pleasant 28 y/o Male w/PMHx of Crohn's disease,  IBS, anxiety disorder, esophageal reflux, hemorrhoids who  presented to [facility] ED c/o Crohn's flare.  Pt has been  on steroids for 5 days and reported left side and lower abdominal cramping  for five days with associated  bright red  blood per rectum.   He has been eating less as it causes more abd pain and stools.  He reports associated fatigue and  also light-headedness .  He  denies cpain/SOB,  no fever/chills, no nausea /vomiting,  no urinary or resp complaints.     5/23 - no cp palps sob; mild diffuse abdo pain. diarrhea   5/24 - tolertaed FLD today but with persistent diarrhea, pt was in the bathroom both times that i came to see him  states his abdo pain is a little better     Vital Signs Last 24 Hrs  T(F): 98.6 (24 May 2023 20:46), Max: 98.6 (24 May 2023 20:46)  HR: 62 (24 May 2023 20:46) (62 - 84)  BP: 125/60 (24 May 2023 20:46) (116/65 - 128/62)  RR: 17 (24 May 2023 20:46) (17 - 18)  SpO2: 100% (24 May 2023 20:46) (97% - 100%)/RA   Constitutional: NAD, awake and alert  HEENT: PERR, EOMI  Neck: Soft and supple,  No JVD  Respiratory: Stable no audible wheezing   Cardiovascular: not examined   Gastrointestinal: not examined   Extremities: No peripheral edema  Vascular: 2+ peripheral pulses  Neurological: A/O x 3, no focal deficits  Musculoskeletal: 5/5 strength b/l upper and lower extremities  Skin: No rashes    RADIOLOGY/EKG:  CT ABDO - shows pancolitis     LABS: All Labs Reviewed:                        13.3   18.86 )-----------( 323      ( 24 May 2023 07:02 )             38.2     05-24    139  |  106  |  8   ----------------------------<  140<H>  4.2   |  26  |  0.67    Ca    8.3<L>      24 May 2023 07:02  Mg     2.1     05-24      MEDS  acetaminophen     Tablet .. 650 milliGRAM(s) Oral every 6 hours PRN  AQUAPHOR (petrolatum Ointment) 1 Application(s) Topical two times a day  bacitracin   Ointment 1 Application(s) Topical three times a day  enoxaparin Injectable 40 milliGRAM(s) SubCutaneous every 24 hours  gabapentin 100 milliGRAM(s) Oral every 8 hours  hemorrhoidal Ointment 1 Application(s) Rectal every 6 hours  hydrocortisone 2.5% Rectal Cream 1 Application(s) Rectal three times a day  hydrocortisone sodium succinate Injectable 100 milliGRAM(s) IV Push three times a day  lidocaine   4% Patch 1 Patch Transdermal daily  mesalamine Suppository 1000 milliGRAM(s) Rectal at bedtime  ondansetron Injectable 4 milliGRAM(s) IV Push every 8 hours PRN  sodium chloride 0.9%. 1000 milliLiter(s) IV Continuous <Continuous>

## 2023-05-24 NOTE — PROGRESS NOTE ADULT - ASSESSMENT
27 year old with history UC presenting with abdominal pain/frequent loose stools    Imp: Pancolitis, UC flare    Rec:  ::Trend daily ESR/CRP  ::Continue IV hydrocortisone 100mg Q8hr, will titrate to Q12hr if symptoms improve   ::Transition to oral steroid upon dc- prednisone 50 mg with 5mg weekly taper  ::Proctosol for hemorrhoidal pain  ::Clear liquids and adv as josse  ::Pain control  ::Chemical VTE prophylaxis despite age and mobility due to high risk in IBD patients

## 2023-05-24 NOTE — DIETITIAN NUTRITION RISK NOTIFICATION - ADDITIONAL COMMENTS/DIETITIAN RECOMMENDATIONS
Advance diet to low fiber when medically feasible, and as tolerated.   MVI w/ minerals daily to ensure 100% RDA met.   Consider adding thiamine 100 mg daily 2/2 poor PO intake/ malnutrition.   If unable to take in PO in next 24 hours, consider initiating PN  Monitor PO intake, tolerance, labs and weight.

## 2023-05-24 NOTE — PROGRESS NOTE ADULT - SUBJECTIVE AND OBJECTIVE BOX
Patient is a 27y old  Male who presents with a chief complaint of Abdominal pain    Followup: UC flare  Still with frequent watery stools. Patient endorsing he is tired "because they wake me up every hour," asking to sleep and will attempt to eat clears tray after lunchtime. Still with cramping after drinking water.      MEDICATIONS  (STANDING):  AQUAPHOR (petrolatum Ointment) 1 Application(s) Topical two times a day  bacitracin   Ointment 1 Application(s) Topical three times a day  enoxaparin Injectable 40 milliGRAM(s) SubCutaneous every 24 hours  gabapentin 100 milliGRAM(s) Oral every 8 hours  hemorrhoidal Ointment 1 Application(s) Rectal every 6 hours  hydrocortisone 2.5% Rectal Cream 1 Application(s) Rectal three times a day  hydrocortisone sodium succinate Injectable 100 milliGRAM(s) IV Push three times a day  lidocaine   4% Patch 1 Patch Transdermal daily  mesalamine Suppository 1000 milliGRAM(s) Rectal at bedtime  sodium chloride 0.9%. 1000 milliLiter(s) (100 mL/Hr) IV Continuous <Continuous>    MEDICATIONS  (PRN):  acetaminophen     Tablet .. 650 milliGRAM(s) Oral every 6 hours PRN Mild Pain (1 - 3)      Vital Signs Last 24 Hrs  T(C): 36.7 (24 May 2023 08:46), Max: 37.1 (23 May 2023 16:01)  T(F): 98 (24 May 2023 08:46), Max: 98.8 (23 May 2023 16:01)  HR: 84 (24 May 2023 08:46) (79 - 91)  BP: 125/60 (24 May 2023 08:46) (116/65 - 125/66)  BP(mean): --  RR: 18 (24 May 2023 08:46) (18 - 18)  SpO2: 97% (24 May 2023 08:46) (97% - 100%)    Parameters below as of 24 May 2023 08:46  Patient On (Oxygen Delivery Method): room air        PHYSICAL EXAM:    Constitutional: No acute distress, non-toxic appearing  HEENT: masked, good phonation, not icteric  Neck: supple, no lymphadenopathy  Respiratory: clear to ascultation bilaterally, no wheezing  Cardiovascular: S1 and S2, regular rate and rhythm, no murmurs rubs or gallops  Gastrointestinal: soft, LQ tender, non-distended, +bowel sounds, no rebound or guarding, no surgical scars, no drains  Extremities: No peripheral edema, no cyanosis or clubbing  Vascular: 2+ peripheral pulses, no venous stasis  Neurological: A/O x 3, no focal deficits, no asterixis  Psychiatric: Normal mood, normal affect  Skin: No rashes, not jaundiced    LABS:                        13.3   18.86 )-----------( 323      ( 24 May 2023 07:02 )             38.2     05-24    139  |  106  |  8   ----------------------------<  140<H>  4.2   |  26  |  0.67    Ca    8.3<L>      24 May 2023 07:02  Mg     2.1     05-24    RADIOLOGY & ADDITIONAL STUDIES:

## 2023-05-24 NOTE — PROGRESS NOTE ADULT - ASSESSMENT
Abdominal pain  GI Bleed  Pancolitis  Leukocytosis , inflammatory due to recent prednisone  Dehydration  Crohn's Disease  Eczema of left hand     - adm to medicine  - ESR is 22,  - s/p 1 L NS in the ED, cont with 100ml/hr  - clear liq diet  - apprec GI consult,  -GI PCR is negative, can be on IV steroids , on friday will  reduce to HC q12h if patient improving   - Nutrition consult  - DVT proph: lovenox

## 2023-05-25 ENCOUNTER — APPOINTMENT (OUTPATIENT)
Dept: GASTROENTEROLOGY | Facility: CLINIC | Age: 28
End: 2023-05-25

## 2023-05-25 LAB
ANION GAP SERPL CALC-SCNC: 4 MMOL/L — LOW (ref 5–17)
BUN SERPL-MCNC: 10 MG/DL — SIGNIFICANT CHANGE UP (ref 7–23)
CALCIUM SERPL-MCNC: 8.2 MG/DL — LOW (ref 8.5–10.1)
CHLORIDE SERPL-SCNC: 109 MMOL/L — HIGH (ref 96–108)
CO2 SERPL-SCNC: 30 MMOL/L — SIGNIFICANT CHANGE UP (ref 22–31)
CREAT SERPL-MCNC: 0.58 MG/DL — SIGNIFICANT CHANGE UP (ref 0.5–1.3)
CRP SERPL-MCNC: 51 MG/L — HIGH
EGFR: 136 ML/MIN/1.73M2 — SIGNIFICANT CHANGE UP
ERYTHROCYTE [SEDIMENTATION RATE] IN BLOOD: 15 MM/HR — SIGNIFICANT CHANGE UP (ref 0–15)
GLUCOSE SERPL-MCNC: 140 MG/DL — HIGH (ref 70–99)
MAGNESIUM SERPL-MCNC: 2.3 MG/DL — SIGNIFICANT CHANGE UP (ref 1.6–2.6)
POTASSIUM SERPL-MCNC: 3.9 MMOL/L — SIGNIFICANT CHANGE UP (ref 3.5–5.3)
POTASSIUM SERPL-SCNC: 3.9 MMOL/L — SIGNIFICANT CHANGE UP (ref 3.5–5.3)
SODIUM SERPL-SCNC: 143 MMOL/L — SIGNIFICANT CHANGE UP (ref 135–145)

## 2023-05-25 PROCEDURE — 99232 SBSQ HOSP IP/OBS MODERATE 35: CPT

## 2023-05-25 RX ORDER — SIMETHICONE 80 MG/1
80 TABLET, CHEWABLE ORAL
Refills: 0 | Status: DISCONTINUED | OUTPATIENT
Start: 2023-05-25 | End: 2023-06-01

## 2023-05-25 RX ORDER — ACETAMINOPHEN 500 MG
1000 TABLET ORAL ONCE
Refills: 0 | Status: COMPLETED | OUTPATIENT
Start: 2023-05-25 | End: 2023-05-25

## 2023-05-25 RX ORDER — SIMETHICONE 80 MG/1
80 TABLET, CHEWABLE ORAL ONCE
Refills: 0 | Status: COMPLETED | OUTPATIENT
Start: 2023-05-25 | End: 2023-05-25

## 2023-05-25 RX ORDER — HYDROCORTISONE 20 MG
100 TABLET ORAL
Refills: 0 | Status: DISCONTINUED | OUTPATIENT
Start: 2023-05-25 | End: 2023-05-26

## 2023-05-25 RX ADMIN — Medication 100 MILLIGRAM(S): at 10:02

## 2023-05-25 RX ADMIN — PHENYLEPHRINE-SHARK LIVER OIL-MINERAL OIL-PETROLATUM RECTAL OINTMENT 1 APPLICATION(S): at 10:02

## 2023-05-25 RX ADMIN — Medication 1 APPLICATION(S): at 21:32

## 2023-05-25 RX ADMIN — PHENYLEPHRINE-SHARK LIVER OIL-MINERAL OIL-PETROLATUM RECTAL OINTMENT 1 APPLICATION(S): at 17:07

## 2023-05-25 RX ADMIN — Medication 1 APPLICATION(S): at 10:02

## 2023-05-25 RX ADMIN — Medication 100 MILLIGRAM(S): at 21:27

## 2023-05-25 RX ADMIN — SIMETHICONE 80 MILLIGRAM(S): 80 TABLET, CHEWABLE ORAL at 21:27

## 2023-05-25 RX ADMIN — Medication 1 APPLICATION(S): at 21:31

## 2023-05-25 RX ADMIN — Medication 1 APPLICATION(S): at 17:06

## 2023-05-25 RX ADMIN — SIMETHICONE 80 MILLIGRAM(S): 80 TABLET, CHEWABLE ORAL at 13:20

## 2023-05-25 RX ADMIN — ONDANSETRON 4 MILLIGRAM(S): 8 TABLET, FILM COATED ORAL at 20:46

## 2023-05-25 RX ADMIN — Medication 1 APPLICATION(S): at 17:07

## 2023-05-25 RX ADMIN — PHENYLEPHRINE-SHARK LIVER OIL-MINERAL OIL-PETROLATUM RECTAL OINTMENT 1 APPLICATION(S): at 20:50

## 2023-05-25 RX ADMIN — PHENYLEPHRINE-SHARK LIVER OIL-MINERAL OIL-PETROLATUM RECTAL OINTMENT 1 APPLICATION(S): at 02:46

## 2023-05-25 RX ADMIN — Medication 100 MILLIGRAM(S): at 02:39

## 2023-05-25 RX ADMIN — Medication 400 MILLIGRAM(S): at 22:33

## 2023-05-25 RX ADMIN — Medication 1000 MILLIGRAM(S): at 23:00

## 2023-05-25 RX ADMIN — SODIUM CHLORIDE 100 MILLILITER(S): 9 INJECTION INTRAMUSCULAR; INTRAVENOUS; SUBCUTANEOUS at 02:45

## 2023-05-25 NOTE — PROGRESS NOTE ADULT - SUBJECTIVE AND OBJECTIVE BOX
Patient is a 27y old  Male who presents with a chief complaint of Abdominal pain    Followup: pancolitis, UC flare  Patient with no pain, but still with cramping and loose stools x 4 after midnight and +flatus.    MEDICATIONS  (STANDING):  AQUAPHOR (petrolatum Ointment) 1 Application(s) Topical two times a day  bacitracin   Ointment 1 Application(s) Topical three times a day  enoxaparin Injectable 40 milliGRAM(s) SubCutaneous every 24 hours  gabapentin 100 milliGRAM(s) Oral every 8 hours  hemorrhoidal Ointment 1 Application(s) Rectal every 6 hours  hydrocortisone 2.5% Rectal Cream 1 Application(s) Rectal three times a day  hydrocortisone sodium succinate Injectable 100 milliGRAM(s) IV Push two times a day  lidocaine   4% Patch 1 Patch Transdermal daily  mesalamine Suppository 1000 milliGRAM(s) Rectal at bedtime  simethicone 80 milliGRAM(s) Chew once  sodium chloride 0.9%. 1000 milliLiter(s) (100 mL/Hr) IV Continuous <Continuous>    MEDICATIONS  (PRN):  acetaminophen     Tablet .. 650 milliGRAM(s) Oral every 6 hours PRN Mild Pain (1 - 3)  ondansetron Injectable 4 milliGRAM(s) IV Push every 8 hours PRN Nausea and/or Vomiting  simethicone 80 milliGRAM(s) Chew four times a day PRN Gas      Vital Signs Last 24 Hrs  T(C): 36.7 (25 May 2023 08:00), Max: 37 (24 May 2023 20:46)  T(F): 98 (25 May 2023 08:00), Max: 98.6 (24 May 2023 20:46)  HR: 62 (25 May 2023 08:00) (62 - 75)  BP: 125/61 (25 May 2023 08:00) (123/67 - 128/62)  BP(mean): --  RR: 17 (25 May 2023 08:00) (17 - 18)  SpO2: 98% (25 May 2023 08:00) (98% - 100%)    Parameters below as of 25 May 2023 08:00  Patient On (Oxygen Delivery Method): room air        PHYSICAL EXAM:    Constitutional: No acute distress, well-developed, non-toxic appearing  HEENT: masked, good phonation, not icteric  Neck: supple, no lymphadenopathy  Respiratory: clear to ascultation bilaterally, no wheezing  Cardiovascular: S1 and S2, regular rate and rhythm, no murmurs rubs or gallops  Gastrointestinal: soft, mild tenderness lower abd, non-distended, +bowel sounds, no rebound or guarding, no surgical scars, no drains  Extremities: No peripheral edema, no cyanosis or clubbing  Vascular: 2+ peripheral pulses, no venous stasis  Neurological: A/O x 3, no focal deficits, no asterixis  Psychiatric: Normal mood, normal affect  Skin: No rashes, not jaundiced    LABS:                        13.3   18.86 )-----------( 323      ( 24 May 2023 07:02 )             38.2     05-25    143  |  109<H>  |  10  ----------------------------<  140<H>  3.9   |  30  |  0.58    Ca    8.2<L>      25 May 2023 06:20  Mg     2.3     05-25            RADIOLOGY & ADDITIONAL STUDIES: Patient is a 27y old  Male who presents with a chief complaint of Abdominal pain    Followup: pancolitis, UC flare  Patient with no pain, but still with cramping and loose stools x 4 after midnight and +flatus.    MEDICATIONS  (STANDING):  AQUAPHOR (petrolatum Ointment) 1 Application(s) Topical two times a day  bacitracin   Ointment 1 Application(s) Topical three times a day  enoxaparin Injectable 40 milliGRAM(s) SubCutaneous every 24 hours  gabapentin 100 milliGRAM(s) Oral every 8 hours  hemorrhoidal Ointment 1 Application(s) Rectal every 6 hours  hydrocortisone 2.5% Rectal Cream 1 Application(s) Rectal three times a day  hydrocortisone sodium succinate Injectable 100 milliGRAM(s) IV Push two times a day  lidocaine   4% Patch 1 Patch Transdermal daily  mesalamine Suppository 1000 milliGRAM(s) Rectal at bedtime  simethicone 80 milliGRAM(s) Chew once  sodium chloride 0.9%. 1000 milliLiter(s) (100 mL/Hr) IV Continuous <Continuous>    MEDICATIONS  (PRN):  acetaminophen     Tablet .. 650 milliGRAM(s) Oral every 6 hours PRN Mild Pain (1 - 3)  ondansetron Injectable 4 milliGRAM(s) IV Push every 8 hours PRN Nausea and/or Vomiting  simethicone 80 milliGRAM(s) Chew four times a day PRN Gas      Vital Signs Last 24 Hrs  T(C): 36.7 (25 May 2023 08:00), Max: 37 (24 May 2023 20:46)  T(F): 98 (25 May 2023 08:00), Max: 98.6 (24 May 2023 20:46)  HR: 62 (25 May 2023 08:00) (62 - 75)  BP: 125/61 (25 May 2023 08:00) (123/67 - 128/62)  BP(mean): --  RR: 17 (25 May 2023 08:00) (17 - 18)  SpO2: 98% (25 May 2023 08:00) (98% - 100%)    Parameters below as of 25 May 2023 08:00  Patient On (Oxygen Delivery Method): room air        PHYSICAL EXAM:    Constitutional: No acute distress, well-developed, non-toxic appearing  HEENT: masked, good phonation, not icteric  Neck: supple, no lymphadenopathy  Respiratory: clear to ascultation bilaterally, no wheezing  Cardiovascular: S1 and S2, regular rate and rhythm, no murmurs rubs or gallops  Gastrointestinal: soft, mild tenderness lower abd, non-distended, +bowel sounds, no rebound or guarding, no surgical scars, no drains  Extremities: No peripheral edema, no cyanosis or clubbing  Vascular: 2+ peripheral pulses, no venous stasis  Neurological: A/O x 3, no focal deficits, no asterixis  Psychiatric: Normal mood, normal affect  Skin: No rashes, not jaundiced    LABS:                        13.3   18.86 )-----------( 323      ( 24 May 2023 07:02 )             38.2     05-25    143  |  109<H>  |  10  ----------------------------<  140<H>  3.9   |  30  |  0.58    Ca    8.2<L>      25 May 2023 06:20  Mg     2.3     05-25

## 2023-05-25 NOTE — PROGRESS NOTE ADULT - ASSESSMENT
27 year old with history UC presenting with abdominal pain/frequent loose stools    Imp: Pancolitis, UC flare    Pain improved, still with cramping and gas   tolerated clears and fulls  ESR and CRP downtrending nicely    Rec:  ::Trend daily ESR/CRP  ::Titrate down hydrocort IV to Q12hr   ::Transition to oral steroid upon dc- prednisone 50 mg with 5mg weekly taper  ::Proctosol for hemorrhoidal pain  ::Simethicone for gas  ::Adv diet to low res  ::Chemical VTE prophylaxis despite age and mobility due to high risk in IBD patients

## 2023-05-25 NOTE — PROGRESS NOTE ADULT - SUBJECTIVE AND OBJECTIVE BOX
CC: Pt is a pleasant 28 y/o Male w/PMHx of Crohn's disease,  IBS, anxiety disorder, esophageal reflux, hemorrhoids who  presented to [facility] c/o Crohn's flare.  Pt has been  on steroids for 5 days and reported left side and lower abdominal cramping  for five days with associated  bright red  blood per rectum.   He has been eating less as it causes more abd pain and stools.  He reports associated fatigue and  also light-headedness .  He  denies cpain/SOB,  no fever/chills, no nausea /vomiting,  no urinary or resp complaints.     5/23 - no cp palps sob; mild diffuse abdo pain. diarrhea   5/24 - tolertaed FLD today but with persistent diarrhea, pt was in the bathroom both times that i came to see him  states his abdo pain is a little better   5/25 - on po diet, diarrhea improving, ambualting    Vital Signs Last 24 Hrs  T(F): 97.9 (25 May 2023 16:00), Max: 98.6 (24 May 2023 20:46)  HR: 72 (25 May 2023 16:00) (62 - 72)  BP: 112/56 (25 May 2023 16:00) (112/56 - 125/61)  RR: 17 (25 May 2023 16:00) (17 - 17)  SpO2: 100% (25 May 2023 16:00) (98% - 100%)/RA   Constitutional: NAD, awake and alert  HEENT: PERR, EOMI  Neck: Soft and supple,  No JVD  Respiratory: Stable no audible wheezing   Cardiovascular: no murmurs  Gastrointestinal: abdo soft, mild tenderness   Extremities: No peripheral edema  Vascular: 2+ peripheral pulses  Neurological: A/O x 3, no focal deficits  Musculoskeletal: 5/5 strength b/l upper and lower extremities  Skin: No rashes    RADIOLOGY/EKG:  CT ABDO - shows pancolitis     LABS: All Labs Reviewed:                        13.3   18.86 )-----------( 323      ( 24 May 2023 07:02 )             38.2     05-25    143  |  109<H>  |  10  ----------------------------<  140<H>  3.9   |  30  |  0.58    Ca    8.2<L>      25 May 2023 06:20  Mg     2.3     05-25      MEDS:   acetaminophen     Tablet .. 650 milliGRAM(s) Oral every 6 hours PRN  AQUAPHOR (petrolatum Ointment) 1 Application(s) Topical two times a day  bacitracin   Ointment 1 Application(s) Topical three times a day  enoxaparin Injectable 40 milliGRAM(s) SubCutaneous every 24 hours  gabapentin 100 milliGRAM(s) Oral every 8 hours  hemorrhoidal Ointment 1 Application(s) Rectal every 6 hours  hydrocortisone 2.5% Rectal Cream 1 Application(s) Rectal three times a day  hydrocortisone sodium succinate Injectable 100 milliGRAM(s) IV Push two times a day  lidocaine   4% Patch 1 Patch Transdermal daily  mesalamine Suppository 1000 milliGRAM(s) Rectal at bedtime  ondansetron Injectable 4 milliGRAM(s) IV Push every 8 hours PRN  simethicone 80 milliGRAM(s) Chew four times a day PRN  sodium chloride 0.9%. 1000 milliLiter(s) IV Continuous <Continuous>

## 2023-05-25 NOTE — PROGRESS NOTE ADULT - ASSESSMENT
Abdominal pain  GI Bleed  Pancolitis  Leukocytosis , inflammatory due to recent prednisone  Dehydration  Crohn's Disease  Eczema of left hand     - adm to medicine  - ESR is 22, CRP improving   - s/p 1 L NS in the ED, cont with 100ml/hr  - clear liq diet, now on low residue  - apprec GI consult,  -GI PCR is negative, can be on IV steroids, reduced HC q12h   - Nutrition consult  - DVT proph: lovenox    DISPO - home tmr after two doses of IV HC on PO steroids   discussed with family at bedside

## 2023-05-26 LAB
ANION GAP SERPL CALC-SCNC: 3 MMOL/L — LOW (ref 5–17)
BASOPHILS # BLD AUTO: 0 K/UL — SIGNIFICANT CHANGE UP (ref 0–0.2)
BASOPHILS NFR BLD AUTO: 0 % — SIGNIFICANT CHANGE UP (ref 0–2)
BUN SERPL-MCNC: 10 MG/DL — SIGNIFICANT CHANGE UP (ref 7–23)
CALCIUM SERPL-MCNC: 7.9 MG/DL — LOW (ref 8.5–10.1)
CHLORIDE SERPL-SCNC: 109 MMOL/L — HIGH (ref 96–108)
CO2 SERPL-SCNC: 31 MMOL/L — SIGNIFICANT CHANGE UP (ref 22–31)
CREAT SERPL-MCNC: 0.58 MG/DL — SIGNIFICANT CHANGE UP (ref 0.5–1.3)
CRP SERPL-MCNC: 32 MG/L — HIGH
EGFR: 136 ML/MIN/1.73M2 — SIGNIFICANT CHANGE UP
EOSINOPHIL # BLD AUTO: 0 K/UL — SIGNIFICANT CHANGE UP (ref 0–0.5)
EOSINOPHIL NFR BLD AUTO: 0 % — SIGNIFICANT CHANGE UP (ref 0–6)
ERYTHROCYTE [SEDIMENTATION RATE] IN BLOOD: 10 MM/HR — SIGNIFICANT CHANGE UP (ref 0–15)
GLUCOSE SERPL-MCNC: 115 MG/DL — HIGH (ref 70–99)
HCT VFR BLD CALC: 36.2 % — LOW (ref 39–50)
HGB BLD-MCNC: 12.4 G/DL — LOW (ref 13–17)
LYMPHOCYTES # BLD AUTO: 1.26 K/UL — SIGNIFICANT CHANGE UP (ref 1–3.3)
LYMPHOCYTES # BLD AUTO: 12 % — LOW (ref 13–44)
MAGNESIUM SERPL-MCNC: 2.4 MG/DL — SIGNIFICANT CHANGE UP (ref 1.6–2.6)
MANUAL SMEAR VERIFICATION: SIGNIFICANT CHANGE UP
MCHC RBC-ENTMCNC: 32.4 PG — SIGNIFICANT CHANGE UP (ref 27–34)
MCHC RBC-ENTMCNC: 34.3 GM/DL — SIGNIFICANT CHANGE UP (ref 32–36)
MCV RBC AUTO: 94.5 FL — SIGNIFICANT CHANGE UP (ref 80–100)
MONOCYTES # BLD AUTO: 1.47 K/UL — HIGH (ref 0–0.9)
MONOCYTES NFR BLD AUTO: 14 % — SIGNIFICANT CHANGE UP (ref 2–14)
NEUTROPHILS # BLD AUTO: 7.69 K/UL — HIGH (ref 1.8–7.4)
NEUTROPHILS NFR BLD AUTO: 73 % — SIGNIFICANT CHANGE UP (ref 43–77)
NRBC # BLD: 0 /100 — SIGNIFICANT CHANGE UP (ref 0–0)
NRBC # BLD: SIGNIFICANT CHANGE UP /100 WBCS (ref 0–0)
PLAT MORPH BLD: NORMAL — SIGNIFICANT CHANGE UP
PLATELET # BLD AUTO: 323 K/UL — SIGNIFICANT CHANGE UP (ref 150–400)
POTASSIUM SERPL-MCNC: 3.5 MMOL/L — SIGNIFICANT CHANGE UP (ref 3.5–5.3)
POTASSIUM SERPL-SCNC: 3.5 MMOL/L — SIGNIFICANT CHANGE UP (ref 3.5–5.3)
RBC # BLD: 3.83 M/UL — LOW (ref 4.2–5.8)
RBC # FLD: 11.6 % — SIGNIFICANT CHANGE UP (ref 10.3–14.5)
RBC BLD AUTO: NORMAL — SIGNIFICANT CHANGE UP
SODIUM SERPL-SCNC: 143 MMOL/L — SIGNIFICANT CHANGE UP (ref 135–145)
VARIANT LYMPHS # BLD: 1 % — SIGNIFICANT CHANGE UP (ref 0–6)
WBC # BLD: 10.53 K/UL — HIGH (ref 3.8–10.5)
WBC # FLD AUTO: 10.53 K/UL — HIGH (ref 3.8–10.5)

## 2023-05-26 PROCEDURE — 99232 SBSQ HOSP IP/OBS MODERATE 35: CPT

## 2023-05-26 PROCEDURE — 99232 SBSQ HOSP IP/OBS MODERATE 35: CPT | Mod: GC

## 2023-05-26 PROCEDURE — 74177 CT ABD & PELVIS W/CONTRAST: CPT | Mod: 26

## 2023-05-26 RX ORDER — ACETAMINOPHEN 500 MG
1000 TABLET ORAL ONCE
Refills: 0 | Status: COMPLETED | OUTPATIENT
Start: 2023-05-26 | End: 2023-05-26

## 2023-05-26 RX ORDER — HYDROCORTISONE 20 MG
100 TABLET ORAL THREE TIMES A DAY
Refills: 0 | Status: DISCONTINUED | OUTPATIENT
Start: 2023-05-26 | End: 2023-05-30

## 2023-05-26 RX ADMIN — SODIUM CHLORIDE 100 MILLILITER(S): 9 INJECTION INTRAMUSCULAR; INTRAVENOUS; SUBCUTANEOUS at 21:04

## 2023-05-26 RX ADMIN — PHENYLEPHRINE-SHARK LIVER OIL-MINERAL OIL-PETROLATUM RECTAL OINTMENT 1 APPLICATION(S): at 18:09

## 2023-05-26 RX ADMIN — Medication 1 APPLICATION(S): at 22:08

## 2023-05-26 RX ADMIN — Medication 400 MILLIGRAM(S): at 15:06

## 2023-05-26 RX ADMIN — Medication 1 APPLICATION(S): at 10:13

## 2023-05-26 RX ADMIN — Medication 1 APPLICATION(S): at 22:07

## 2023-05-26 RX ADMIN — Medication 100 MILLIGRAM(S): at 18:08

## 2023-05-26 RX ADMIN — Medication 1 APPLICATION(S): at 18:09

## 2023-05-26 RX ADMIN — Medication 1 APPLICATION(S): at 10:14

## 2023-05-26 RX ADMIN — Medication 100 MILLIGRAM(S): at 10:10

## 2023-05-26 RX ADMIN — PHENYLEPHRINE-SHARK LIVER OIL-MINERAL OIL-PETROLATUM RECTAL OINTMENT 1 APPLICATION(S): at 13:16

## 2023-05-26 RX ADMIN — PHENYLEPHRINE-SHARK LIVER OIL-MINERAL OIL-PETROLATUM RECTAL OINTMENT 1 APPLICATION(S): at 05:32

## 2023-05-26 RX ADMIN — Medication 1000 MILLIGRAM(S): at 15:36

## 2023-05-26 RX ADMIN — SIMETHICONE 80 MILLIGRAM(S): 80 TABLET, CHEWABLE ORAL at 13:16

## 2023-05-26 RX ADMIN — Medication 1 APPLICATION(S): at 18:10

## 2023-05-26 RX ADMIN — PHENYLEPHRINE-SHARK LIVER OIL-MINERAL OIL-PETROLATUM RECTAL OINTMENT 1 APPLICATION(S): at 00:47

## 2023-05-26 RX ADMIN — SIMETHICONE 80 MILLIGRAM(S): 80 TABLET, CHEWABLE ORAL at 18:08

## 2023-05-26 RX ADMIN — SODIUM CHLORIDE 100 MILLILITER(S): 9 INJECTION INTRAMUSCULAR; INTRAVENOUS; SUBCUTANEOUS at 00:44

## 2023-05-26 RX ADMIN — SODIUM CHLORIDE 100 MILLILITER(S): 9 INJECTION INTRAMUSCULAR; INTRAVENOUS; SUBCUTANEOUS at 11:03

## 2023-05-26 NOTE — PROGRESS NOTE ADULT - ASSESSMENT
27 year old with history UC presenting with abdominal pain/frequent loose stools    Imp: Pancolitis, UC flare    Pain improved overall with setback after large meal last night- increased frequency and cramping  Tolerated some low-residue yesterday  ESR and CRP downtrending    Rec:  ::Trend daily ESR/CRP  ::Continue hydrocort IV Q12hr   ::Repeat CT  ::Transition to oral steroid upon dc- prednisone 50 mg with 5mg weekly taper  ::Proctosol for hemorrhoidal pain  ::Simethicone for gas  ::May back off diet to fulls if needed, low res as josse  ::Chemical VTE prophylaxis despite age and mobility due to high risk in IBD patients

## 2023-05-26 NOTE — PROGRESS NOTE ADULT - SUBJECTIVE AND OBJECTIVE BOX
CC: Pt is a pleasant 28 y/o Male w/PMHx of Crohn's disease,  IBS, anxiety disorder, esophageal reflux, hemorrhoids who  presented to [facility] c/o Crohn's flare.  Pt has been  on steroids for 5 days and reported left side and lower abdominal cramping  for five days with associated  bright red  blood per rectum.   He has been eating less as it causes more abd pain and stools.  He reports associated fatigue and  also light-headedness .  He  denies cpain/SOB,  no fever/chills, no nausea /vomiting,  no urinary or resp complaints.     5/23 - no cp palps sob; mild diffuse abdo pain. diarrhea   5/24 - tolertaed FLD today but with persistent diarrhea, pt was in the bathroom both times that i came to see him  states his abdo pain is a little better   5/25 - on po diet, diarrhea improving, ambualting  5/26 - about 15 BMS today; pt thinks he he overate yesterday and today feels worse and eating and drinking less    Vital Signs Last 24 Hrs  T(F): 97.8 (26 May 2023 15:24), Max: 98.9 (25 May 2023 23:03)  HR: 66 (26 May 2023 15:24) (58 - 79)  BP: 116/60 (26 May 2023 15:24) (116/60 - 128/65)  RR: 18 (26 May 2023 15:24) (17 - 18)  SpO2: 99% (26 May 2023 15:24) (98% - 99%)/RA   Constitutional: NAD, awake and alert  HEENT: PERR, EOMI  Neck: Soft and supple,  No JVD  Respiratory: Stable no audible wheezing   Cardiovascular: no murmurs  Gastrointestinal: abdo soft, mild tenderness   Extremities: No peripheral edema  Vascular: 2+ peripheral pulses  Neurological: A/O x 3, no focal deficits  Musculoskeletal: 5/5 strength b/l upper and lower extremities  Skin: No rashes      RADIOLOGY/EKG:  RPT CT ABDO 5/26:   < from: CT Abdomen and Pelvis w/ IV Cont (05.26.23 @ 10:47) >  *  Pancolitis again seen with worsening of thickening and inflammation in   the ascending and transverse colon. No luminal distention or pneumatosis.  *  No evidence of ileitis or obstruction.        LABS: All Labs Reviewed:                     12.4   10.53 )-----------( 323      ( 26 May 2023 08:26 )             36.2   143  |  109<H>  |  10  ----------------------------<  115<H>  3.5   |  31  |  0.58    Ca    7.9<L>      26 May 2023 08:26  Mg     2.4     05-26    MEDS:   acetaminophen     Tablet .. 650 milliGRAM(s) Oral every 6 hours PRN  AQUAPHOR (petrolatum Ointment) 1 Application(s) Topical two times a day  bacitracin   Ointment 1 Application(s) Topical three times a day  enoxaparin Injectable 40 milliGRAM(s) SubCutaneous every 24 hours  gabapentin 100 milliGRAM(s) Oral every 8 hours  hemorrhoidal Ointment 1 Application(s) Rectal every 6 hours  hydrocortisone 2.5% Rectal Cream 1 Application(s) Rectal three times a day  hydrocortisone sodium succinate Injectable 100 milliGRAM(s) IV Push three times a day  lidocaine   4% Patch 1 Patch Transdermal daily  mesalamine Suppository 1000 milliGRAM(s) Rectal at bedtime  ondansetron Injectable 4 milliGRAM(s) IV Push every 8 hours PRN  simethicone 80 milliGRAM(s) Chew four times a day PRN  sodium chloride 0.9%. 1000 milliLiter(s) IV Continuous <Continuous>

## 2023-05-26 NOTE — PROGRESS NOTE ADULT - ASSESSMENT
Abdominal pain  GI Bleed  Pancolitis  Leukocytosis , inflammatory due to recent prednisone  Dehydration  Crohn's Disease  Eczema of left hand     - ESR is 22, CRP steadily improving   - now on low residue diet but with poor po intake compared to yesterday  - cont IVFs  - RPT CT imaging noted   -GI PCR is negative, can be on IV steroids, reduced HC q12h - keep this dose   - Nutrition consult  - DVT proph: lovenox    DISPO - consider dc home tmr after two doses of IV HC on PO steroids if hydrating and eating reasonably well

## 2023-05-26 NOTE — PROGRESS NOTE ADULT - SUBJECTIVE AND OBJECTIVE BOX
Patient is a 27y old  Male who presents with a chief complaint of Abdominal pain    Followup: UC flare  Pt endorses tolerating eggs/toast/bagel yesterday and cereal then after turkey dinner had increase cramping and increased frequency loose stools with pain overnight and minimal sleep. "Scared to eat or drink now," per patient.       MEDICATIONS  (STANDING):  AQUAPHOR (petrolatum Ointment) 1 Application(s) Topical two times a day  bacitracin   Ointment 1 Application(s) Topical three times a day  enoxaparin Injectable 40 milliGRAM(s) SubCutaneous every 24 hours  gabapentin 100 milliGRAM(s) Oral every 8 hours  hemorrhoidal Ointment 1 Application(s) Rectal every 6 hours  hydrocortisone 2.5% Rectal Cream 1 Application(s) Rectal three times a day  hydrocortisone sodium succinate Injectable 100 milliGRAM(s) IV Push two times a day  lidocaine   4% Patch 1 Patch Transdermal daily  mesalamine Suppository 1000 milliGRAM(s) Rectal at bedtime  sodium chloride 0.9%. 1000 milliLiter(s) (100 mL/Hr) IV Continuous <Continuous>    MEDICATIONS  (PRN):  acetaminophen     Tablet .. 650 milliGRAM(s) Oral every 6 hours PRN Mild Pain (1 - 3)  ondansetron Injectable 4 milliGRAM(s) IV Push every 8 hours PRN Nausea and/or Vomiting  simethicone 80 milliGRAM(s) Chew four times a day PRN Gas      Vital Signs Last 24 Hrs  T(C): 36.8 (26 May 2023 08:19), Max: 37.2 (25 May 2023 23:03)  T(F): 98.2 (26 May 2023 08:19), Max: 98.9 (25 May 2023 23:03)  HR: 58 (26 May 2023 08:19) (58 - 79)  BP: 128/65 (26 May 2023 08:19) (112/56 - 128/65)  BP(mean): --  RR: 17 (26 May 2023 08:19) (17 - 18)  SpO2: 99% (26 May 2023 08:19) (98% - 100%)    Parameters below as of 26 May 2023 08:19  Patient On (Oxygen Delivery Method): room air        PHYSICAL EXAM:    Constitutional: No acute distress, well-developed, non-toxic appearing  HEENT: masked, good phonation, not icteric  Neck: supple, no lymphadenopathy  Respiratory: clear to ascultation bilaterally, no wheezing  Cardiovascular: S1 and S2, regular rate and rhythm, no murmurs rubs or gallops  Gastrointestinal: soft, LLQ tender, non-distended, +bowel sounds, no rebound or guarding, no surgical scars, no drains  Extremities: No peripheral edema, no cyanosis or clubbing  Vascular: 2+ peripheral pulses, no venous stasis  Neurological: A/O x 3, no focal deficits, no asterixis  Psychiatric: Normal mood, normal affect  Skin: No rashes, not jaundiced    LABS:                        12.4   10.53 )-----------( 323      ( 26 May 2023 08:26 )             36.2     05-26    143  |  109<H>  |  10  ----------------------------<  115<H>  3.5   |  31  |  0.58    Ca    7.9<L>      26 May 2023 08:26  Mg     2.4     05-26    RADIOLOGY & ADDITIONAL STUDIES: Patient is a 27y old  Male who presents with a chief complaint of Abdominal pain    Followup: UC flare  Pt endorses tolerating eggs/toast/bagel yesterday and cereal then after turkey dinner had increase cramping and increased frequency loose stools with pain overnight and minimal sleep. "Scared to eat or drink now," per patient.       MEDICATIONS  (STANDING):  AQUAPHOR (petrolatum Ointment) 1 Application(s) Topical two times a day  bacitracin   Ointment 1 Application(s) Topical three times a day  enoxaparin Injectable 40 milliGRAM(s) SubCutaneous every 24 hours  gabapentin 100 milliGRAM(s) Oral every 8 hours  hemorrhoidal Ointment 1 Application(s) Rectal every 6 hours  hydrocortisone 2.5% Rectal Cream 1 Application(s) Rectal three times a day  hydrocortisone sodium succinate Injectable 100 milliGRAM(s) IV Push two times a day  lidocaine   4% Patch 1 Patch Transdermal daily  mesalamine Suppository 1000 milliGRAM(s) Rectal at bedtime  sodium chloride 0.9%. 1000 milliLiter(s) (100 mL/Hr) IV Continuous <Continuous>    MEDICATIONS  (PRN):  acetaminophen     Tablet .. 650 milliGRAM(s) Oral every 6 hours PRN Mild Pain (1 - 3)  ondansetron Injectable 4 milliGRAM(s) IV Push every 8 hours PRN Nausea and/or Vomiting  simethicone 80 milliGRAM(s) Chew four times a day PRN Gas      Vital Signs Last 24 Hrs  T(C): 36.8 (26 May 2023 08:19), Max: 37.2 (25 May 2023 23:03)  T(F): 98.2 (26 May 2023 08:19), Max: 98.9 (25 May 2023 23:03)  HR: 58 (26 May 2023 08:19) (58 - 79)  BP: 128/65 (26 May 2023 08:19) (112/56 - 128/65)  BP(mean): --  RR: 17 (26 May 2023 08:19) (17 - 18)  SpO2: 99% (26 May 2023 08:19) (98% - 100%)    Parameters below as of 26 May 2023 08:19  Patient On (Oxygen Delivery Method): room air        PHYSICAL EXAM:    Constitutional: No acute distress, well-developed, non-toxic appearing  HEENT: unmasked, good phonation, not icteric  Neck: supple, no lymphadenopathy  Respiratory: clear to ascultation bilaterally, no wheezing  Cardiovascular: S1 and S2, regular rate and rhythm, no murmurs rubs or gallops  Gastrointestinal: soft, LLQ tender to deep palpation, non-distended, +bowel sounds, no rebound or guarding, no surgical scars, no drains  Extremities: No peripheral edema, no cyanosis or clubbing  Vascular: 2+ peripheral pulses, no venous stasis  Neurological: A/O x 3, no focal deficits, no asterixis  Psychiatric: Normal mood, normal affect  Skin: No rashes, not jaundiced    LABS:                        12.4   10.53 )-----------( 323      ( 26 May 2023 08:26 )             36.2     05-26    143  |  109<H>  |  10  ----------------------------<  115<H>  3.5   |  31  |  0.58    Ca    7.9<L>      26 May 2023 08:26  Mg     2.4     05-26    RADIOLOGY & ADDITIONAL STUDIES:

## 2023-05-27 LAB
ANION GAP SERPL CALC-SCNC: 4 MMOL/L — LOW (ref 5–17)
BUN SERPL-MCNC: 7 MG/DL — SIGNIFICANT CHANGE UP (ref 7–23)
CALCIUM SERPL-MCNC: 7.7 MG/DL — LOW (ref 8.5–10.1)
CHLORIDE SERPL-SCNC: 104 MMOL/L — SIGNIFICANT CHANGE UP (ref 96–108)
CO2 SERPL-SCNC: 30 MMOL/L — SIGNIFICANT CHANGE UP (ref 22–31)
CREAT SERPL-MCNC: 0.48 MG/DL — LOW (ref 0.5–1.3)
CRP SERPL-MCNC: 40 MG/L — HIGH
EGFR: 144 ML/MIN/1.73M2 — SIGNIFICANT CHANGE UP
ERYTHROCYTE [SEDIMENTATION RATE] IN BLOOD: 14 MM/HR — SIGNIFICANT CHANGE UP (ref 0–15)
GLUCOSE SERPL-MCNC: 131 MG/DL — HIGH (ref 70–99)
POTASSIUM SERPL-MCNC: 3.2 MMOL/L — LOW (ref 3.5–5.3)
POTASSIUM SERPL-SCNC: 3.2 MMOL/L — LOW (ref 3.5–5.3)
SODIUM SERPL-SCNC: 138 MMOL/L — SIGNIFICANT CHANGE UP (ref 135–145)

## 2023-05-27 PROCEDURE — 99232 SBSQ HOSP IP/OBS MODERATE 35: CPT

## 2023-05-27 RX ORDER — POTASSIUM CHLORIDE 20 MEQ
40 PACKET (EA) ORAL ONCE
Refills: 0 | Status: COMPLETED | OUTPATIENT
Start: 2023-05-27 | End: 2023-05-27

## 2023-05-27 RX ORDER — DEXTROSE MONOHYDRATE, SODIUM CHLORIDE, AND POTASSIUM CHLORIDE 50; .745; 4.5 G/1000ML; G/1000ML; G/1000ML
1000 INJECTION, SOLUTION INTRAVENOUS
Refills: 0 | Status: DISCONTINUED | OUTPATIENT
Start: 2023-05-27 | End: 2023-05-28

## 2023-05-27 RX ORDER — POTASSIUM CHLORIDE 20 MEQ
20 PACKET (EA) ORAL
Refills: 0 | Status: DISCONTINUED | OUTPATIENT
Start: 2023-05-27 | End: 2023-05-27

## 2023-05-27 RX ORDER — SODIUM CHLORIDE 9 MG/ML
1000 INJECTION INTRAMUSCULAR; INTRAVENOUS; SUBCUTANEOUS
Refills: 0 | Status: DISCONTINUED | OUTPATIENT
Start: 2023-05-27 | End: 2023-05-27

## 2023-05-27 RX ADMIN — Medication 100 MILLIGRAM(S): at 10:25

## 2023-05-27 RX ADMIN — Medication 1 APPLICATION(S): at 10:25

## 2023-05-27 RX ADMIN — PHENYLEPHRINE-SHARK LIVER OIL-MINERAL OIL-PETROLATUM RECTAL OINTMENT 1 APPLICATION(S): at 11:51

## 2023-05-27 RX ADMIN — Medication 100 MILLIGRAM(S): at 02:00

## 2023-05-27 RX ADMIN — DEXTROSE MONOHYDRATE, SODIUM CHLORIDE, AND POTASSIUM CHLORIDE 80 MILLILITER(S): 50; .745; 4.5 INJECTION, SOLUTION INTRAVENOUS at 22:56

## 2023-05-27 RX ADMIN — Medication 1 APPLICATION(S): at 21:06

## 2023-05-27 RX ADMIN — Medication 1 APPLICATION(S): at 15:58

## 2023-05-27 RX ADMIN — SODIUM CHLORIDE 100 MILLILITER(S): 9 INJECTION INTRAMUSCULAR; INTRAVENOUS; SUBCUTANEOUS at 21:05

## 2023-05-27 RX ADMIN — PHENYLEPHRINE-SHARK LIVER OIL-MINERAL OIL-PETROLATUM RECTAL OINTMENT 1 APPLICATION(S): at 00:00

## 2023-05-27 RX ADMIN — PHENYLEPHRINE-SHARK LIVER OIL-MINERAL OIL-PETROLATUM RECTAL OINTMENT 1 APPLICATION(S): at 18:00

## 2023-05-27 RX ADMIN — Medication 40 MILLIEQUIVALENT(S): at 21:05

## 2023-05-27 RX ADMIN — PHENYLEPHRINE-SHARK LIVER OIL-MINERAL OIL-PETROLATUM RECTAL OINTMENT 1 APPLICATION(S): at 22:57

## 2023-05-27 RX ADMIN — Medication 100 MILLIGRAM(S): at 18:26

## 2023-05-27 RX ADMIN — SODIUM CHLORIDE 100 MILLILITER(S): 9 INJECTION INTRAMUSCULAR; INTRAVENOUS; SUBCUTANEOUS at 06:42

## 2023-05-27 RX ADMIN — Medication 1 APPLICATION(S): at 17:59

## 2023-05-27 NOTE — PROGRESS NOTE ADULT - SUBJECTIVE AND OBJECTIVE BOX
CC: Pt is a pleasant 28 y/o Male w/PMHx of Crohn's disease,  IBS, anxiety disorder, esophageal reflux, hemorrhoids who  presented to [facility] c/o Crohn's flare.  Pt has been  on steroids for 5 days and reported left side and lower abdominal cramping  for five days with associated  bright red  blood per rectum.   He has been eating less as it causes more abd pain and stools.  He reports associated fatigue and  also light-headedness .  He  denies cpain/SOB,  no fever/chills, no nausea /vomiting,  no urinary or resp complaints.     5/23 - no cp palps sob; mild diffuse abdo pain. diarrhea   5/24 - tolertaed FLD today but with persistent diarrhea, pt was in the bathroom both times that i came to see him  states his abdo pain is a little better   5/25 - on po diet, diarrhea improving, ambualting  5/26 - about 15 BMS today; pt thinks he overate yesterday and today feels worse and eating and drinking less  5/27 - no cp palps sob. had 15 episodes of diarrhea, poor appetite     Vital Signs Last 24 Hrs  T(F): 98 (27 May 2023 18:16), Max: 98.7 (26 May 2023 23:30)  HR: 56 (27 May 2023 18:16) (56 - 57)  BP: 125/68 (27 May 2023 18:16) (125/68 - 128/67)  BP(mean): 80 (27 May 2023 18:16) (80 - 80)  RR: 18 (27 May 2023 18:16) (18 - 18)  SpO2: 99% (27 May 2023 18:16) (97% - 99%)/RA   Constitutional: NAD, awake and alert - pt with rec diarrhea, limited physical examn   HEENT: PERR, EOMI  Neck: Soft and supple,  No JVD  Respiratory: Stable no audible wheezing   Cardiovascular: not examined   Gastrointestinal: not examined   Extremities: No peripheral edema  Vascular: 2+ peripheral pulses  Neurological: A/O x 3, no focal deficits  Musculoskeletal: 5/5 strength b/l upper and lower extremities  Skin: No rashes      LABS: All Labs Reviewed:                      12.4   10.53 )-----------( 323      ( 26 May 2023 08:26 )             36.2    138  |  104  |  7   ----------------------------<  131<H>  3.2<L>   |  30  |  0.48<L>      RPT CT ABDO 5/26:   < from: CT Abdomen and Pelvis w/ IV Cont (05.26.23 @ 10:47) >  *  Pancolitis again seen with worsening of thickening and inflammation in   the ascending and transverse colon. No luminal distention or pneumatosis.  *  No evidence of ileitis or obstruction.    MEDS:   acetaminophen     Tablet .. 650 milliGRAM(s) Oral every 6 hours PRN  AQUAPHOR (petrolatum Ointment) 1 Application(s) Topical two times a day  bacitracin   Ointment 1 Application(s) Topical three times a day  enoxaparin Injectable 40 milliGRAM(s) SubCutaneous every 24 hours  gabapentin 100 milliGRAM(s) Oral every 8 hours  hemorrhoidal Ointment 1 Application(s) Rectal every 6 hours  hydrocortisone 2.5% Rectal Cream 1 Application(s) Rectal three times a day  hydrocortisone sodium succinate Injectable 100 milliGRAM(s) IV Push three times a day  lidocaine   4% Patch 1 Patch Transdermal daily  mesalamine Suppository 1000 milliGRAM(s) Rectal at bedtime  ondansetron Injectable 4 milliGRAM(s) IV Push every 8 hours PRN  simethicone 80 milliGRAM(s) Chew four times a day PRN  sodium chloride 0.9%. 1000 milliLiter(s) IV Continuous <Continuous>

## 2023-05-27 NOTE — PROGRESS NOTE ADULT - ASSESSMENT
Abdominal pain  GI Bleed  Pancolitis  Leukocytosis , inflammatory due to recent prednisone  Dehydration  Crohn's Disease  Eczema of left hand     Plan:  - ESR is 22, CRP steadily improving   - now on low residue diet but with poor po intake compared to yesterday  - RPT CT imaging noted   - give KCL PO; lower IVFs   - On IV  q8h; due to persistent diarrhea will recheck CDIFF and GI PCR   - Nutrition consult  - DVT proph: lovenox    DISPO - discussed with Dr Kaiser, covering for the service; Dr Cox to see pt tmr      Abdominal pain  GI Bleed  Pancolitis  Leukocytosis , inflammatory due to recent prednisone  Dehydration  Crohn's Disease  Eczema of left hand     Plan:  - ESR is 22, CRP steadily improving   - now on low residue diet but with poor po intake compared to yesterday  - revert to [place] diet   - RPT CT imaging noted   - give KCL PO; lower IVFs : [id number] at 80cc/hr   - On IV  q8h; due to persistent diarrhea will recheck CDIFF and GI PCR   - Nutrition consult  - DVT proph: lovenox    DISPO - discussed with Dr Kaiser, covering for the service; Dr Cox to see pt tmr

## 2023-05-28 LAB
ANION GAP SERPL CALC-SCNC: 4 MMOL/L — LOW (ref 5–17)
BUN SERPL-MCNC: 8 MG/DL — SIGNIFICANT CHANGE UP (ref 7–23)
C DIFF BY PCR RESULT: SIGNIFICANT CHANGE UP
CALCIUM SERPL-MCNC: 7.8 MG/DL — LOW (ref 8.5–10.1)
CHLORIDE SERPL-SCNC: 103 MMOL/L — SIGNIFICANT CHANGE UP (ref 96–108)
CO2 SERPL-SCNC: 31 MMOL/L — SIGNIFICANT CHANGE UP (ref 22–31)
CREAT SERPL-MCNC: 0.59 MG/DL — SIGNIFICANT CHANGE UP (ref 0.5–1.3)
CRP SERPL-MCNC: 33 MG/L — HIGH
EGFR: 136 ML/MIN/1.73M2 — SIGNIFICANT CHANGE UP
GI PCR PANEL: SIGNIFICANT CHANGE UP
GLUCOSE SERPL-MCNC: 127 MG/DL — HIGH (ref 70–99)
HCT VFR BLD CALC: 37 % — LOW (ref 39–50)
HGB BLD-MCNC: 12.6 G/DL — LOW (ref 13–17)
MAGNESIUM SERPL-MCNC: 2.4 MG/DL — SIGNIFICANT CHANGE UP (ref 1.6–2.6)
MCHC RBC-ENTMCNC: 31.7 PG — SIGNIFICANT CHANGE UP (ref 27–34)
MCHC RBC-ENTMCNC: 34.1 GM/DL — SIGNIFICANT CHANGE UP (ref 32–36)
MCV RBC AUTO: 93.2 FL — SIGNIFICANT CHANGE UP (ref 80–100)
PHOSPHATE SERPL-MCNC: 2.8 MG/DL — SIGNIFICANT CHANGE UP (ref 2.5–4.5)
PLATELET # BLD AUTO: 366 K/UL — SIGNIFICANT CHANGE UP (ref 150–400)
POTASSIUM SERPL-MCNC: 3.5 MMOL/L — SIGNIFICANT CHANGE UP (ref 3.5–5.3)
POTASSIUM SERPL-SCNC: 3.5 MMOL/L — SIGNIFICANT CHANGE UP (ref 3.5–5.3)
RBC # BLD: 3.97 M/UL — LOW (ref 4.2–5.8)
RBC # FLD: 11.4 % — SIGNIFICANT CHANGE UP (ref 10.3–14.5)
SODIUM SERPL-SCNC: 138 MMOL/L — SIGNIFICANT CHANGE UP (ref 135–145)
WBC # BLD: 11.99 K/UL — HIGH (ref 3.8–10.5)
WBC # FLD AUTO: 11.99 K/UL — HIGH (ref 3.8–10.5)

## 2023-05-28 PROCEDURE — 99232 SBSQ HOSP IP/OBS MODERATE 35: CPT

## 2023-05-28 RX ADMIN — Medication 100 MILLIGRAM(S): at 17:35

## 2023-05-28 RX ADMIN — PHENYLEPHRINE-SHARK LIVER OIL-MINERAL OIL-PETROLATUM RECTAL OINTMENT 1 APPLICATION(S): at 14:54

## 2023-05-28 RX ADMIN — PHENYLEPHRINE-SHARK LIVER OIL-MINERAL OIL-PETROLATUM RECTAL OINTMENT 1 APPLICATION(S): at 21:21

## 2023-05-28 RX ADMIN — Medication 1 APPLICATION(S): at 21:21

## 2023-05-28 RX ADMIN — Medication 1 APPLICATION(S): at 10:40

## 2023-05-28 RX ADMIN — Medication 1 APPLICATION(S): at 16:40

## 2023-05-28 RX ADMIN — Medication 1 APPLICATION(S): at 21:22

## 2023-05-28 RX ADMIN — Medication 1 APPLICATION(S): at 10:39

## 2023-05-28 RX ADMIN — Medication 1 APPLICATION(S): at 16:12

## 2023-05-28 RX ADMIN — Medication 100 MILLIGRAM(S): at 02:13

## 2023-05-28 RX ADMIN — DEXTROSE MONOHYDRATE, SODIUM CHLORIDE, AND POTASSIUM CHLORIDE 80 MILLILITER(S): 50; .745; 4.5 INJECTION, SOLUTION INTRAVENOUS at 10:39

## 2023-05-28 RX ADMIN — Medication 100 MILLIGRAM(S): at 10:39

## 2023-05-28 NOTE — PROGRESS NOTE ADULT - SUBJECTIVE AND OBJECTIVE BOX
Patient is a 27y old  Male who presents with a chief complaint of Abdominal pain  GI Bleed  Pancolitis  Crohn's Disease (27 May 2023 21:57)    Follow up for: IBD/diarrhea/pain    Patient with less cramping but was only doing clears. Still with several watery stools, patient states 7 overnight. Had 15 total day prior. Team re-sent c diff/and GI PCR for worsening symptoms.       MEDICATIONS  (STANDING):  AQUAPHOR (petrolatum Ointment) 1 Application(s) Topical two times a day  bacitracin   Ointment 1 Application(s) Topical three times a day  enoxaparin Injectable 40 milliGRAM(s) SubCutaneous every 24 hours  gabapentin 100 milliGRAM(s) Oral every 8 hours  hemorrhoidal Ointment 1 Application(s) Rectal every 6 hours  hydrocortisone 2.5% Rectal Cream 1 Application(s) Rectal three times a day  hydrocortisone sodium succinate Injectable 100 milliGRAM(s) IV Push three times a day  lidocaine   4% Patch 1 Patch Transdermal daily  mesalamine Suppository 1000 milliGRAM(s) Rectal at bedtime  sodium chloride 0.45% with potassium chloride 20 mEq/L 1000 milliLiter(s) (80 mL/Hr) IV Continuous <Continuous>    MEDICATIONS  (PRN):  acetaminophen     Tablet .. 650 milliGRAM(s) Oral every 6 hours PRN Mild Pain (1 - 3)  ondansetron Injectable 4 milliGRAM(s) IV Push every 8 hours PRN Nausea and/or Vomiting  simethicone 80 milliGRAM(s) Chew four times a day PRN Gas      Vital Signs Last 24 Hrs  T(C): 36.9 (28 May 2023 01:56), Max: 36.9 (28 May 2023 01:56)  T(F): 98.5 (28 May 2023 01:56), Max: 98.5 (28 May 2023 01:56)  HR: 55 (28 May 2023 01:56) (55 - 57)  BP: 126/61 (28 May 2023 01:56) (125/68 - 127/66)  BP(mean): 76 (28 May 2023 01:56) (76 - 80)  RR: 18 (28 May 2023 01:56) (18 - 18)  SpO2: 97% (28 May 2023 01:56) (97% - 99%)    Parameters below as of 28 May 2023 01:56  Patient On (Oxygen Delivery Method): room air        PHYSICAL EXAM:    Constitutional: No acute distress, well-developed, non-toxic appearing  HEENT: unmasked, good phonation, not icteric  Neck: supple, no lymphadenopathy  Respiratory: clear to ascultation bilaterally, no wheezing  Cardiovascular: S1 and S2, regular rate and rhythm, no murmurs rubs or gallops  Gastrointestinal: soft, tender to deep palpation, non-distended, +bowel sounds, no rebound or guarding, no surgical scars, no drains  Extremities: No peripheral edema, no cyanosis or clubbing  Vascular: 2+ peripheral pulses, no venous stasis  Neurological: A/O x 3, no focal deficits, no asterixis  Psychiatric: Normal mood, normal affect  Skin: No rashes, not jaundiced    LABS:                        12.6   11.99 )-----------( 366      ( 28 May 2023 06:32 )             37.0     05-28    138  |  103  |  8   ----------------------------<  127<H>  3.5   |  31  |  0.59    Ca    7.8<L>      28 May 2023 06:32  Phos  2.8     05-28  Mg     2.4     05-28    ESR/CRP overall downtrending

## 2023-05-28 NOTE — PROGRESS NOTE ADULT - ASSESSMENT
27 year old man with crohn's colitis admitted with flare.     Will await repeat c diff but doubtful it will be positive. Feel that worsening symptoms secondary to dietary indiscretions previous night.   Continue IV hydrocort 100mg q 8 for now. If no improvement in symptoms in the next 24-48 hours may need flex sig.   Continue mesalamine.   Continue lovenox for dvt ppx in IBD patient with flare even if mobile  Daily esr/crp  I encouraged patient to try and eat, will do full liquids now and can advance to low residue diet as tolerated.   Avoid narcotics.    27 year old man with crohn's colitis admitted with flare.     Will await repeat c diff but doubtful it will be positive. Feel that worsening symptoms secondary to dietary indiscretions previous night.   Continue IV hydrocort 100mg q 8 for now. If no improvement in symptoms in the next 24-48 hours may need flex sig.   Continue mesalamine.   Continue lovenox for dvt ppx in IBD patient with flare even if mobile  Daily esr/crp  I encouraged patient to try and eat, will do full liquids now and can advance to low residue diet as tolerated.   Try and avoid narcotics.

## 2023-05-28 NOTE — PROGRESS NOTE ADULT - SUBJECTIVE AND OBJECTIVE BOX
CC: Pt is a pleasant 28 y/o Male w/PMHx of Crohn's disease,  IBS, anxiety disorder, esophageal reflux, hemorrhoids who  presented to [facility] c/o Crohn's flare.  Pt has been  on steroids for 5 days and reported left side and lower abdominal cramping  for five days with associated  bright red  blood per rectum.   He has been eating less as it causes more abd pain and stools.  He reports associated fatigue and  also light-headedness .  He  denies cpain/SOB,  no fever/chills, no nausea /vomiting,  no urinary or resp complaints.     5/23 - no cp palps sob; mild diffuse abdo pain. diarrhea   5/24 - tolerated FLD today but with persistent diarrhea, pt was in the bathroom both times that i came to see him  states his abdo pain is a little better   5/25 - on po diet, diarrhea improving, ambualting  5/26 - about 15 BMS today; pt thinks he overate yesterday and today feels worse and eating and drinking less  5/27 - no cp palps sob. had 15 episodes of diarrhea, poor appetite   5/28 - having pasta; feels a bit better today; diarrhea improving; OOB    Vital Signs Last 24 Hrs  T(F): 98.4 (28 May 2023 16:32), Max: 98.5 (28 May 2023 01:56)  HR: 59 (28 May 2023 16:32) (55 - 59)  BP: 117/69 (28 May 2023 16:32) (117/69 - 126/61)  BP(mean): 76 (28 May 2023 01:56) (76 - 76)  RR: 18 (28 May 2023 16:32) (18 - 18)  SpO2: 98% (28 May 2023 16:32) (97% - 98%)/RA   Constitutional: NAD, awake and alert  HEENT: PERR, EOMI  Neck: Soft and supple,  No JVD  Respiratory: Stable no audible wheezing   Cardiovascular: S1 S2 no murmurs  Gastrointestinal: soft   Extremities: No peripheral edema  Vascular: 2+ peripheral pulses  Neurological: A/O x 3, no focal deficits  Musculoskeletal: 5/5 strength b/l upper and lower extremities  Skin: No rashes      LABS/RAD   RPT CT ABDO 5/26:   < from: CT Abdomen and Pelvis w/ IV Cont (05.26.23 @ 10:47) >  *  Pancolitis again seen with worsening of thickening and inflammation in   the ascending and transverse colon. No luminal distention or pneumatosis.  *  No evidence of ileitis or obstruction.    LABS: All Labs Reviewed                     12.6   11.99 )-----------( 366      ( 28 May 2023 06:32 )             37.0  138  |  103  |  8   ----------------------------<  127<H>  3.5   |  31  |  0.59    MEDS:   acetaminophen     Tablet .. 650 milliGRAM(s) Oral every 6 hours PRN  AQUAPHOR (petrolatum Ointment) 1 Application(s) Topical two times a day  bacitracin   Ointment 1 Application(s) Topical three times a day  enoxaparin Injectable 40 milliGRAM(s) SubCutaneous every 24 hours  gabapentin 100 milliGRAM(s) Oral every 8 hours  hemorrhoidal Ointment 1 Application(s) Rectal every 6 hours  hydrocortisone 2.5% Rectal Cream 1 Application(s) Rectal three times a day  hydrocortisone sodium succinate Injectable 100 milliGRAM(s) IV Push three times a day  lidocaine   4% Patch 1 Patch Transdermal daily  mesalamine Suppository 1000 milliGRAM(s) Rectal at bedtime  ondansetron Injectable 4 milliGRAM(s) IV Push every 8 hours PRN  simethicone 80 milliGRAM(s) Chew four times a day PRN  sodium chloride 0.45% with potassium chloride 20 mEq/L 1000 milliLiter(s) IV Continuous <Continuous>

## 2023-05-28 NOTE — PROGRESS NOTE ADULT - ASSESSMENT
Abdominal pain  GI Bleed  Pancolitis  Leukocytosis , inflammatory due to recent prednisone  Dehydration  Crohn's Disease  Eczema of left hand     Plan:  - ESR is 22, CRP steadily improving   - now on low residue diet  - RPT CT imaging noted   - DC IVFs   - On IV  q8h;   - due to persistent diarrhea will recheck CDIFF and GI PCR - NEG   - Nutrition consult  - DVT proph: lovenox rec but pt declined; is OOB     DISPO - cont PO diet, IV HC, dc IVF

## 2023-05-29 LAB
ANION GAP SERPL CALC-SCNC: 5 MMOL/L — SIGNIFICANT CHANGE UP (ref 5–17)
BUN SERPL-MCNC: 9 MG/DL — SIGNIFICANT CHANGE UP (ref 7–23)
CALCIUM SERPL-MCNC: 8 MG/DL — LOW (ref 8.5–10.1)
CHLORIDE SERPL-SCNC: 105 MMOL/L — SIGNIFICANT CHANGE UP (ref 96–108)
CO2 SERPL-SCNC: 31 MMOL/L — SIGNIFICANT CHANGE UP (ref 22–31)
CREAT SERPL-MCNC: 0.45 MG/DL — LOW (ref 0.5–1.3)
EGFR: 147 ML/MIN/1.73M2 — SIGNIFICANT CHANGE UP
GLUCOSE SERPL-MCNC: 137 MG/DL — HIGH (ref 70–99)
MAGNESIUM SERPL-MCNC: 2.6 MG/DL — SIGNIFICANT CHANGE UP (ref 1.6–2.6)
PHOSPHATE SERPL-MCNC: 2.8 MG/DL — SIGNIFICANT CHANGE UP (ref 2.5–4.5)
POTASSIUM SERPL-MCNC: 3.3 MMOL/L — LOW (ref 3.5–5.3)
POTASSIUM SERPL-SCNC: 3.3 MMOL/L — LOW (ref 3.5–5.3)
SODIUM SERPL-SCNC: 141 MMOL/L — SIGNIFICANT CHANGE UP (ref 135–145)

## 2023-05-29 PROCEDURE — 99232 SBSQ HOSP IP/OBS MODERATE 35: CPT

## 2023-05-29 PROCEDURE — 99232 SBSQ HOSP IP/OBS MODERATE 35: CPT | Mod: GC

## 2023-05-29 RX ORDER — POTASSIUM CHLORIDE 20 MEQ
40 PACKET (EA) ORAL ONCE
Refills: 0 | Status: COMPLETED | OUTPATIENT
Start: 2023-05-29 | End: 2023-05-29

## 2023-05-29 RX ADMIN — Medication 1 APPLICATION(S): at 22:18

## 2023-05-29 RX ADMIN — Medication 1 APPLICATION(S): at 15:10

## 2023-05-29 RX ADMIN — PHENYLEPHRINE-SHARK LIVER OIL-MINERAL OIL-PETROLATUM RECTAL OINTMENT 1 APPLICATION(S): at 18:40

## 2023-05-29 RX ADMIN — Medication 100 MILLIGRAM(S): at 10:53

## 2023-05-29 RX ADMIN — PHENYLEPHRINE-SHARK LIVER OIL-MINERAL OIL-PETROLATUM RECTAL OINTMENT 1 APPLICATION(S): at 12:31

## 2023-05-29 RX ADMIN — Medication 40 MILLIEQUIVALENT(S): at 23:21

## 2023-05-29 RX ADMIN — Medication 1 APPLICATION(S): at 16:18

## 2023-05-29 RX ADMIN — Medication 100 MILLIGRAM(S): at 01:55

## 2023-05-29 RX ADMIN — Medication 1 APPLICATION(S): at 10:30

## 2023-05-29 RX ADMIN — Medication 100 MILLIGRAM(S): at 18:39

## 2023-05-29 RX ADMIN — PHENYLEPHRINE-SHARK LIVER OIL-MINERAL OIL-PETROLATUM RECTAL OINTMENT 1 APPLICATION(S): at 23:21

## 2023-05-29 RX ADMIN — Medication 1 APPLICATION(S): at 10:53

## 2023-05-29 NOTE — PROGRESS NOTE ADULT - ASSESSMENT
Abdominal pain  GI Bleed  Pancolitis  Leukocytosis , inflammatory due to recent prednisone  Dehydration  Crohn's Disease  Eczema of left hand     Plan:  - ESR is 22, CRP steadily improving   - now on low residue diet  - RPT CT imaging noted   - DC IVFs   - On IV  q8h; possibly taper tmr   - due to persistent diarrhea will recheck CDIFF and GI PCR - NEG   - Nutrition consult  - DVT proph: lovenox rec but pt declined; is OOB     DISPO - cont PO diet, IV HC

## 2023-05-29 NOTE — PROGRESS NOTE ADULT - SUBJECTIVE AND OBJECTIVE BOX
Patient is a 27y old  Male who presents with a chief complaint of Abdominal pain  GI Bleed  Pancolitis  Crohn's Disease (28 May 2023 18:40)    Follow up for: crohn's colitis    Patient with improvement. Only had two BM's last night, watery, but this when he was awakened (had 7 the other night). Non blood, just watery.   Tolerated his solid diet.       MEDICATIONS  (STANDING):  AQUAPHOR (petrolatum Ointment) 1 Application(s) Topical two times a day  bacitracin   Ointment 1 Application(s) Topical three times a day  enoxaparin Injectable 40 milliGRAM(s) SubCutaneous every 24 hours  gabapentin 100 milliGRAM(s) Oral every 8 hours  hemorrhoidal Ointment 1 Application(s) Rectal every 6 hours  hydrocortisone 2.5% Rectal Cream 1 Application(s) Rectal three times a day  hydrocortisone sodium succinate Injectable 100 milliGRAM(s) IV Push three times a day  lidocaine   4% Patch 1 Patch Transdermal daily  mesalamine Suppository 1000 milliGRAM(s) Rectal at bedtime    MEDICATIONS  (PRN):  acetaminophen     Tablet .. 650 milliGRAM(s) Oral every 6 hours PRN Mild Pain (1 - 3)  ondansetron Injectable 4 milliGRAM(s) IV Push every 8 hours PRN Nausea and/or Vomiting  simethicone 80 milliGRAM(s) Chew four times a day PRN Gas      Vital Signs Last 24 Hrs  T(C): 36.7 (29 May 2023 07:40), Max: 36.9 (28 May 2023 08:30)  T(F): 98 (29 May 2023 07:40), Max: 98.5 (29 May 2023 00:00)  HR: 56 (29 May 2023 07:40) (50 - 59)  BP: 118/55 (29 May 2023 07:40) (117/69 - 121/64)  BP(mean): --  RR: 18 (29 May 2023 07:40) (18 - 18)  SpO2: 96% (29 May 2023 07:40) (96% - 99%)    Parameters below as of 29 May 2023 07:40  Patient On (Oxygen Delivery Method): room air        PHYSICAL EXAM:    Constitutional: No acute distress, well-developed, non-toxic appearing  HEENT: masked, good phonation, not icteric  Neck: supple, no lymphadenopathy  Respiratory: clear to ascultation bilaterally, no wheezing  Cardiovascular: S1 and S2, regular rate and rhythm, no murmurs rubs or gallops  Gastrointestinal: soft, tender to deep palpation, non-distended, +bowel sounds, no rebound or guarding, no surgical scars, no drains  Extremities: No peripheral edema, no cyanosis or clubbing  Vascular: 2+ peripheral pulses, no venous stasis  Neurological: A/O x 3, no focal deficits, no asterixis  Psychiatric: Normal mood, normal affect  Skin: No rashes, not jaundiced    LABS:                        12.6   11.99 )-----------( 366      ( 28 May 2023 06:32 )             37.0     05-29    141  |  105  |  9   ----------------------------<  137<H>  3.3<L>   |  31  |  0.45<L>    Ca    8.0<L>      29 May 2023 06:34  Phos  2.8     05-29  Mg     2.6     05-29             Patient is a 27y old  Male who presents with a chief complaint of Abdominal pain  GI Bleed  Pancolitis  Crohn's Disease (28 May 2023 18:40)    Follow up for: crohn's colitis    Patient with improvement. Only had two BM's last night, watery, but this when he was awakened (had 7 the other night). Non blood, just watery.   Tolerated his solid diet.       MEDICATIONS  (STANDING):  AQUAPHOR (petrolatum Ointment) 1 Application(s) Topical two times a day  bacitracin   Ointment 1 Application(s) Topical three times a day  enoxaparin Injectable 40 milliGRAM(s) SubCutaneous every 24 hours  gabapentin 100 milliGRAM(s) Oral every 8 hours  hemorrhoidal Ointment 1 Application(s) Rectal every 6 hours  hydrocortisone 2.5% Rectal Cream 1 Application(s) Rectal three times a day  hydrocortisone sodium succinate Injectable 100 milliGRAM(s) IV Push three times a day  lidocaine   4% Patch 1 Patch Transdermal daily  mesalamine Suppository 1000 milliGRAM(s) Rectal at bedtime    MEDICATIONS  (PRN):  acetaminophen     Tablet .. 650 milliGRAM(s) Oral every 6 hours PRN Mild Pain (1 - 3)  ondansetron Injectable 4 milliGRAM(s) IV Push every 8 hours PRN Nausea and/or Vomiting  simethicone 80 milliGRAM(s) Chew four times a day PRN Gas      Vital Signs Last 24 Hrs  T(C): 36.7 (29 May 2023 07:40), Max: 36.9 (28 May 2023 08:30)  T(F): 98 (29 May 2023 07:40), Max: 98.5 (29 May 2023 00:00)  HR: 56 (29 May 2023 07:40) (50 - 59)  BP: 118/55 (29 May 2023 07:40) (117/69 - 121/64)  BP(mean): --  RR: 18 (29 May 2023 07:40) (18 - 18)  SpO2: 96% (29 May 2023 07:40) (96% - 99%)    Parameters below as of 29 May 2023 07:40  Patient On (Oxygen Delivery Method): room air        PHYSICAL EXAM:    Constitutional: No acute distress, well-developed, non-toxic appearing  HEENT: unmasked, good phonation, not icteric  Neck: supple, no lymphadenopathy  Respiratory: clear to ascultation bilaterally, no wheezing  Cardiovascular: S1 and S2, regular rate and rhythm, no murmurs rubs or gallops  Gastrointestinal: soft, tender to deep palpation, non-distended, +bowel sounds, no rebound or guarding, no surgical scars, no drains  Extremities: No peripheral edema, no cyanosis or clubbing  Vascular: 2+ peripheral pulses, no venous stasis  Neurological: A/O x 3, no focal deficits, no asterixis  Psychiatric: Normal mood, normal affect  Skin: No rashes, not jaundiced    LABS:                        12.6   11.99 )-----------( 366      ( 28 May 2023 06:32 )             37.0     05-29    141  |  105  |  9   ----------------------------<  137<H>  3.3<L>   |  31  |  0.45<L>    Ca    8.0<L>      29 May 2023 06:34  Phos  2.8     05-29  Mg     2.6     05-29

## 2023-05-29 NOTE — PROGRESS NOTE ADULT - ASSESSMENT
27 year old man with crohn's colitis flare.     Finally turning the corner.   Continue IV steroids at current rate, hydrocort 100 IV q 8. May start to taper tomorrow.   Continue dvt ppx in IBD patient even if mobile, pro inlammatory state.   c diff, gi pcr negative  daily ESR, CRP, these are downtrending

## 2023-05-29 NOTE — PROGRESS NOTE ADULT - SUBJECTIVE AND OBJECTIVE BOX
CC: Pt is a pleasant 28 y/o Male w/PMHx of Crohn's disease,  IBS, anxiety disorder, esophageal reflux, hemorrhoids who  presented to [facility] c/o Crohn's flare.  Pt has been  on steroids for 5 days and reported left side and lower abdominal cramping  for five days with associated  bright red  blood per rectum.   He has been eating less as it causes more abd pain and stools.  He reports associated fatigue and  also light-headedness .  He  denies cpain/SOB,  no fever/chills, no nausea /vomiting,  no urinary or resp complaints.     5/23 - no cp palps sob; mild diffuse abdo pain. diarrhea   5/24 - tolerated FLD today but with persistent diarrhea, pt was in the bathroom both times that i came to see him  states his abdo pain is a little better   5/25 - on po diet, diarrhea improving, ambualting  5/26 - about 15 BMS today; pt thinks he overate yesterday and today feels worse and eating and drinking less  5/27 - no cp palps sob. had 15 episodes of diarrhea, poor appetite   5/28 - having pasta; feels a bit better today; diarrhea improving; OOB  5/30 - no cp palps sob; abdo pain better. ambulating     Vital Signs Last 24 Hrs  Vital Signs Last 24 Hrs  T(C): 36.4 (29 May 2023 15:30), Max: 36.9 (29 May 2023 00:00)  T(F): 97.6 (29 May 2023 15:30), Max: 98.5 (29 May 2023 00:00)  HR: 72 (29 May 2023 15:30) (50 - 72)  BP: 123/62 (29 May 2023 15:30) (118/55 - 123/62)  BP(mean): 76 (29 May 2023 15:30) (76 - 76)  RR: 18 (29 May 2023 15:30) (18 - 18)  SpO2: 96% (29 May 2023 15:30) (96% - 99%)/RA   Constitutional: NAD, awake and alert  HEENT: PERR, EOMI  Neck: Soft and supple,  No JVD  Respiratory: Stable no audible wheezing   Cardiovascular: S1 S2 no murmurs  Gastrointestinal: soft   Extremities: No peripheral edema  Vascular: 2+ peripheral pulses  Neurological: A/O x 3, no focal deficits  Musculoskeletal: 5/5 strength b/l upper and lower extremities  Skin: No rashes      LABS/RAD   RPT CT ABDO 5/26:   < from: CT Abdomen and Pelvis w/ IV Cont (05.26.23 @ 10:47) >  *  Pancolitis again seen with worsening of thickening and inflammation in   the ascending and transverse colon. No luminal distention or pneumatosis.  *  No evidence of ileitis or obstruction.    LABS: All Labs Reviewed:                        12.6   11.99 )-----------( 366      ( 28 May 2023 06:32 )             37.0     05-29    141  |  105  |  9   ----------------------------<  137<H>  3.3<L>   |  31  |  0.45<L>        acetaminophen     Tablet .. 650 milliGRAM(s) Oral every 6 hours PRN  AQUAPHOR (petrolatum Ointment) 1 Application(s) Topical two times a day  bacitracin   Ointment 1 Application(s) Topical three times a day  enoxaparin Injectable 40 milliGRAM(s) SubCutaneous every 24 hours  gabapentin 100 milliGRAM(s) Oral every 8 hours  hemorrhoidal Ointment 1 Application(s) Rectal every 6 hours  hydrocortisone 2.5% Rectal Cream 1 Application(s) Rectal three times a day  hydrocortisone sodium succinate Injectable 100 milliGRAM(s) IV Push three times a day  lidocaine   4% Patch 1 Patch Transdermal daily  mesalamine Suppository 1000 milliGRAM(s) Rectal at bedtime  ondansetron Injectable 4 milliGRAM(s) IV Push every 8 hours PRN  simethicone 80 milliGRAM(s) Chew four times a day PRN

## 2023-05-30 LAB
ANION GAP SERPL CALC-SCNC: 4 MMOL/L — LOW (ref 5–17)
BUN SERPL-MCNC: 9 MG/DL — SIGNIFICANT CHANGE UP (ref 7–23)
CALCIUM SERPL-MCNC: 8 MG/DL — LOW (ref 8.5–10.1)
CHLORIDE SERPL-SCNC: 104 MMOL/L — SIGNIFICANT CHANGE UP (ref 96–108)
CO2 SERPL-SCNC: 32 MMOL/L — HIGH (ref 22–31)
CREAT SERPL-MCNC: 0.55 MG/DL — SIGNIFICANT CHANGE UP (ref 0.5–1.3)
EGFR: 138 ML/MIN/1.73M2 — SIGNIFICANT CHANGE UP
GLUCOSE SERPL-MCNC: 127 MG/DL — HIGH (ref 70–99)
MAGNESIUM SERPL-MCNC: 2.5 MG/DL — SIGNIFICANT CHANGE UP (ref 1.6–2.6)
POTASSIUM SERPL-MCNC: 3.3 MMOL/L — LOW (ref 3.5–5.3)
POTASSIUM SERPL-SCNC: 3.3 MMOL/L — LOW (ref 3.5–5.3)
SODIUM SERPL-SCNC: 140 MMOL/L — SIGNIFICANT CHANGE UP (ref 135–145)

## 2023-05-30 PROCEDURE — 99232 SBSQ HOSP IP/OBS MODERATE 35: CPT

## 2023-05-30 RX ORDER — POTASSIUM CHLORIDE 20 MEQ
10 PACKET (EA) ORAL
Refills: 0 | Status: DISCONTINUED | OUTPATIENT
Start: 2023-05-30 | End: 2023-05-30

## 2023-05-30 RX ORDER — POTASSIUM CHLORIDE 20 MEQ
10 PACKET (EA) ORAL
Refills: 0 | Status: COMPLETED | OUTPATIENT
Start: 2023-05-30 | End: 2023-05-30

## 2023-05-30 RX ORDER — DEXTROSE MONOHYDRATE, SODIUM CHLORIDE, AND POTASSIUM CHLORIDE 50; .745; 4.5 G/1000ML; G/1000ML; G/1000ML
1000 INJECTION, SOLUTION INTRAVENOUS
Refills: 0 | Status: DISCONTINUED | OUTPATIENT
Start: 2023-05-30 | End: 2023-05-31

## 2023-05-30 RX ADMIN — PHENYLEPHRINE-SHARK LIVER OIL-MINERAL OIL-PETROLATUM RECTAL OINTMENT 1 APPLICATION(S): at 18:12

## 2023-05-30 RX ADMIN — Medication 1 APPLICATION(S): at 10:15

## 2023-05-30 RX ADMIN — Medication 10 MILLIEQUIVALENT(S): at 16:58

## 2023-05-30 RX ADMIN — PHENYLEPHRINE-SHARK LIVER OIL-MINERAL OIL-PETROLATUM RECTAL OINTMENT 1 APPLICATION(S): at 12:00

## 2023-05-30 RX ADMIN — Medication 1 APPLICATION(S): at 18:13

## 2023-05-30 RX ADMIN — Medication 1 APPLICATION(S): at 18:12

## 2023-05-30 RX ADMIN — Medication 30 MILLIGRAM(S): at 10:15

## 2023-05-30 RX ADMIN — Medication 1 APPLICATION(S): at 10:16

## 2023-05-30 RX ADMIN — Medication 1 APPLICATION(S): at 22:33

## 2023-05-30 RX ADMIN — Medication 10 MILLIEQUIVALENT(S): at 18:50

## 2023-05-30 RX ADMIN — Medication 30 MILLIGRAM(S): at 22:24

## 2023-05-30 RX ADMIN — Medication 1 APPLICATION(S): at 18:14

## 2023-05-30 RX ADMIN — DEXTROSE MONOHYDRATE, SODIUM CHLORIDE, AND POTASSIUM CHLORIDE 65 MILLILITER(S): 50; .745; 4.5 INJECTION, SOLUTION INTRAVENOUS at 23:31

## 2023-05-30 RX ADMIN — Medication 100 MILLIGRAM(S): at 02:19

## 2023-05-30 NOTE — PROGRESS NOTE ADULT - SUBJECTIVE AND OBJECTIVE BOX
Patient is a 27y old  Male who presents with a chief complaint of Abdominal pain    Followup: crohns colitis flare  At bedside still with 15+ watery/blood mixed stools daily. Tolerating plain, low residue diet. Endorses gassy bloated abdomen, denying acute pain.    MEDICATIONS  (STANDING):  AQUAPHOR (petrolatum Ointment) 1 Application(s) Topical two times a day  bacitracin   Ointment 1 Application(s) Topical three times a day  enoxaparin Injectable 40 milliGRAM(s) SubCutaneous every 24 hours  gabapentin 100 milliGRAM(s) Oral every 8 hours  hemorrhoidal Ointment 1 Application(s) Rectal every 6 hours  hydrocortisone 2.5% Rectal Cream 1 Application(s) Rectal three times a day  lidocaine   4% Patch 1 Patch Transdermal daily  mesalamine Suppository 1000 milliGRAM(s) Rectal at bedtime  methylPREDNISolone sodium succinate Injectable 30 milliGRAM(s) IV Push every 12 hours  potassium chloride    Tablet ER 10 milliEquivalent(s) Oral every 2 hours    MEDICATIONS  (PRN):  acetaminophen     Tablet .. 650 milliGRAM(s) Oral every 6 hours PRN Mild Pain (1 - 3)  ondansetron Injectable 4 milliGRAM(s) IV Push every 8 hours PRN Nausea and/or Vomiting  simethicone 80 milliGRAM(s) Chew four times a day PRN Gas      Vital Signs Last 24 Hrs  T(C): 36.5 (30 May 2023 09:52), Max: 36.9 (29 May 2023 23:00)  T(F): 97.7 (30 May 2023 09:52), Max: 98.4 (29 May 2023 23:00)  HR: 71 (30 May 2023 09:52) (61 - 72)  BP: 122/57 (30 May 2023 09:52) (122/57 - 124/69)  BP(mean): 76 (29 May 2023 15:30) (76 - 76)  RR: 20 (30 May 2023 09:52) (18 - 20)  SpO2: 99% (30 May 2023 09:52) (96% - 99%)    Parameters below as of 30 May 2023 09:52  Patient On (Oxygen Delivery Method): room air        PHYSICAL EXAM:    Constitutional: No acute distress, well-developed, non-toxic appearing  HEENT: masked, good phonation, not icteric  Neck: supple, no lymphadenopathy  Respiratory: clear to ascultation bilaterally, no wheezing  Cardiovascular: S1 and S2, regular rate and rhythm, no murmurs rubs or gallops  Gastrointestinal: soft, non-tender, non-distended, +bowel sounds, no rebound or guarding, no surgical scars, no drains  Extremities: No peripheral edema, no cyanosis or clubbing  Vascular: 2+ peripheral pulses, no venous stasis  Neurological: A/O x 3, no focal deficits, no asterixis  Psychiatric: Normal mood, normal affect  Skin: No rashes, not jaundiced    LABS:    05-30    140  |  104  |  9   ----------------------------<  127<H>  3.3<L>   |  32<H>  |  0.55    Ca    8.0<L>      30 May 2023 07:39  Phos  2.8     05-29  Mg     2.5     05-30            RADIOLOGY & ADDITIONAL STUDIES:

## 2023-05-30 NOTE — PROGRESS NOTE ADULT - ASSESSMENT
28 y/o Male w/PMHx of Crohn's disease,  IBS, anxiety disorder, esophageal reflux, hemorrhoids admitted  to  on 5/22/23  with  Crohn's flare.  Pt has been  on steroids for 5 days and reported left side and lower abdominal cramping  for five days with associated  bright red  blood per rectum.   He has been eating less as it causes more abd pain and stools.  He reports associated fatigue and  also light-headedness .       Diffuse  Abdominal pain with bloody diarrhea due to Crohn's pancolitis  flare  - CT abd noted -  thickening and inflammation in the ascending and transverse colon.   - CRP trending down 120--> 33   - Leukocytosis , inflammatory due to recent prednisone  - s/p  IV hydrocortisone 100 q8h, now on solumedrol 30 mg q12h    - as per GI team plan to  dc on  prednisone 50 mg with 5mg weekly taper  - c/w mesalamine 1000 mg rectal supp  - diet advance to low residue   - DI consult - plan for endoscopic evaluation eli , will keep pt NPO     Rectal pain   - c/w hydrocortisone cream and hemorrhoid cream    Hypokalemia, Hypocalcemia  - pt refuses to take PO K , check vit D     Normocytic anemia due to lower GI bleed  - check iron studies, B12, folate  - monitor Hb daily    Dehydration, resolved  - s/p fluids, now some ankle edema  - monitor off IV fluids    Eczema of left hand   - c/w creams     DVT proph: lovenox      DISPO - cont PO diet,  IV steroids, endoscopic evaluation as per GI

## 2023-05-30 NOTE — PROGRESS NOTE ADULT - SUBJECTIVE AND OBJECTIVE BOX
CC: multiple bowel movements with blood and mucus        28 y/o Male w/PMHx of Crohn's disease,  IBS, anxiety disorder, esophageal reflux, hemorrhoids admitted  to  on 5/22/23  with  Crohn's flare.  Pt has been  on steroids for 5 days and reported left side and lower abdominal cramping  for five days with associated  bright red  blood per rectum.   He has been eating less as it causes more abd pain and stools.  He reports associated fatigue and  also light-headedness .        hospital course :   5/24 - tolerated FLD today but with persistent diarrhea ,  abdominal  pain is a little better   5/26 - about 15 BMS today; pt thinks he overate yesterday and today feels worse and eating and drinking less  5/28 - feels a bit better today; diarrhea improving; OOB    5/30 - pt seen and examined, chart reviewed, reports 15 BM a day , was up to 60 before admission, denies cp, dyspnea , palpitations, dizziness, abdominal pain worse after BMs, afebrile, can not tolerate PO K pill and do not IV form ,  will take foods pema in potassium    Review of system- Rest of the review of system are negative except mentioned in HPI    Vital sings reviewed for last 24 h  T(C): 36.9 (05-30-23 @ 16:00), Max: 36.9 (05-29-23 @ 23:00)  T(F): 98.5 (05-30-23 @ 16:00), Max: 98.5 (05-30-23 @ 16:00)  HR: 75 (05-30-23 @ 16:00) (61 - 75)  BP: 122/57 (05-30-23 @ 16:00) (122/57 - 124/69)  RR: 18 (05-30-23 @ 16:00) (18 - 20)  SpO2: 97% (05-30-23 @ 16:00) (96% - 99%)  Wt(kg): --  Daily     Daily   CAPILLARY BLOOD GLUCOSE  PHYSICAL EXAM:    Constitutional: NAD, awake and alert   HEENT: PERR, EOMI, Normal Hearing, MMM  Neck: Soft and supple, No LAD, No JVD  Respiratory: Breath sounds are clear bilaterally, No wheezing, rales or rhonchi  Cardiovascular: S1 and S2, regular rate and rhythm, no Murmurs, gallops or rubs  Gastrointestinal: Bowel Sounds present, soft, diffuse tenderness,  nondistended, no guarding, no rebound  Extremities: trace bilateral ankle peripheral edema  Vascular: 2+ peripheral pulses  Neurological: A/O x 3, no focal deficits  Musculoskeletal: 5/5 strength b/l upper and lower extremities  Skin: No rashes  rectal : deferred, not indicated      All labs radiology and other studies reviewed and interpreted :   05-30    140  |  104  |  9   ----------------------------<  127<H>  3.3<L>   |  32<H>  |  0.55    Ca    8.0<L>      30 May 2023 07:39  Phos  2.8     05-29  Mg     2.5     05-30       CT Abdomen and Pelvis w/ IV Cont (05.26.23 @ 10:47) >  FINDINGS:  LOWER CHEST: Clear.    LIVER: Mild periportal edema.  BILE DUCTS: Nondilated.  GALLBLADDER: Normal.  SPLEEN: Normal.  PANCREAS: Normal.  ADRENALS: Normal.  KIDNEYS/URETERS: Symmetric nephrograms. No hydronephrosis.    BLADDER: Underdistended limiting evaluation.  REPRODUCTIVE ORGANS: Nonenlarged.    BOWEL: Pancolitis again seen with worsening of thickening and   inflammation in the ascending and transverse colon. No luminal distention   or pneumatosis. No evidence of ileitis or obstruction.  PERITONEUM: Small pelvic free fluid. No free air.  VESSELS: Normal caliber aorta.  RETROPERITONEUM/LYMPH NODES: No adenopathy.  ABDOMINAL WALL: Normal.  BONES: No acute bony abnormality.    IMPRESSION:  *  Pancolitis again seen with worsening of thickening and inflammation in   the ascending and transverse colon. No luminal distention or pneumatosis.  *  No evidence of ileitis or obstruction.        C-Reactive Protein, Serum: 33 mg/L (05-28-23 @ 06:32)  C-Reactive Protein, Serum: 40 mg/L (05-27-23 @ 07:29)  C-Reactive Protein, Serum: 32 mg/L (05-26-23 @ 08:26)  C-Reactive Protein, Serum: 51 mg/L (05-25-23 @ 06:20)  C-Reactive Protein, Serum: 120 mg/L (05-24-23 @ 07:02)  C-Reactive Protein, Serum: 88 mg/L (05-22-23 @ 15:00)    GI PCR Panel Stool (05.27.23 @ 19:30)    GI PCR Panel: NotDetec:    Clostridium difficile Toxin by PCR (05.27.23 @ 19:30)    C Diff by PCR Result: NotDetec:       MEDICATIONS  (STANDING):  AQUAPHOR (petrolatum Ointment) 1 Application(s) Topical two times a day  bacitracin   Ointment 1 Application(s) Topical three times a day  enoxaparin Injectable 40 milliGRAM(s) SubCutaneous every 24 hours  gabapentin 100 milliGRAM(s) Oral every 8 hours  hemorrhoidal Ointment 1 Application(s) Rectal every 6 hours  hydrocortisone 2.5% Rectal Cream 1 Application(s) Rectal three times a day  lidocaine   4% Patch 1 Patch Transdermal daily  mesalamine Suppository 1000 milliGRAM(s) Rectal at bedtime  methylPREDNISolone sodium succinate Injectable 30 milliGRAM(s) IV Push every 12 hours    MEDICATIONS  (PRN):  acetaminophen     Tablet .. 650 milliGRAM(s) Oral every 6 hours PRN Mild Pain (1 - 3)  ondansetron Injectable 4 milliGRAM(s) IV Push every 8 hours PRN Nausea and/or Vomiting  simethicone 80 milliGRAM(s) Chew four times a day PRN Gas

## 2023-05-30 NOTE — PROGRESS NOTE ADULT - ASSESSMENT
27 year old with history crohns presenting with abdominal pain/frequent loose stools    Imp: Pancolitis, crohns colitis flare    Tolerating low residue  Bloated/gassy  15+ loose mixed blood stools still  ESR and CRP downtrending    Rec:  ::Trend daily ESR/CRP  ::Hydrocort interchange to medrol 60 mg IV due to shortage  ::Transition to oral steroid upon dc- prednisone 50 mg with 5mg weekly taper  ::Proctosol for hemorrhoidal pain  ::Simethicone for gas  ::Continue low residue diet  ::Chemical VTE prophylaxis despite age and mobility due to high risk in IBD patients

## 2023-05-30 NOTE — PROGRESS NOTE ADULT - NS ATTEND AMEND GEN_ALL_CORE FT
Pt well known to me  Lost to f/u after d/c meds    Now with flare  we are just starting steroids and we discussed plan for hospitalization and then outpt colonoscopy in several weeks and restarting biologics
he is slowly improving  Bm improved and less pain  still with pain with oral intake    anticipate tapering steroids today or tomorrow
His pain is improving but still with frequent loose BMs at night    Will check flex sig tomorrow - evaluate for extent of disease  taper steroids  d/c planning  ?restart stelara
27 year old man with UC with flare.     Titrating steroids and transitioning to PO.
27 year old with UC here with flare.     Worsening pain after dietary indescretions, will increase IV steroids this weekend to TID. Will follow and adjust accordingly.   Repeat CT with ongoing inflammation  daily esr/crp  continue dvt ppx, chemical, even if mobile, as ibd is a proinflammatory state

## 2023-05-31 LAB
24R-OH-CALCIDIOL SERPL-MCNC: 23.3 NG/ML — LOW (ref 30–80)
ALBUMIN SERPL ELPH-MCNC: 2.6 G/DL — LOW (ref 3.3–5)
ALP SERPL-CCNC: 36 U/L — LOW (ref 40–120)
ALT FLD-CCNC: 46 U/L — SIGNIFICANT CHANGE UP (ref 12–78)
ANION GAP SERPL CALC-SCNC: 4 MMOL/L — LOW (ref 5–17)
AST SERPL-CCNC: 15 U/L — SIGNIFICANT CHANGE UP (ref 15–37)
BASOPHILS # BLD AUTO: 0.02 K/UL — SIGNIFICANT CHANGE UP (ref 0–0.2)
BASOPHILS NFR BLD AUTO: 0.2 % — SIGNIFICANT CHANGE UP (ref 0–2)
BILIRUB SERPL-MCNC: 0.3 MG/DL — SIGNIFICANT CHANGE UP (ref 0.2–1.2)
BUN SERPL-MCNC: 7 MG/DL — SIGNIFICANT CHANGE UP (ref 7–23)
CALCIUM SERPL-MCNC: 8.1 MG/DL — LOW (ref 8.5–10.1)
CHLORIDE SERPL-SCNC: 105 MMOL/L — SIGNIFICANT CHANGE UP (ref 96–108)
CO2 SERPL-SCNC: 30 MMOL/L — SIGNIFICANT CHANGE UP (ref 22–31)
CREAT SERPL-MCNC: 0.67 MG/DL — SIGNIFICANT CHANGE UP (ref 0.5–1.3)
CRP SERPL-MCNC: 12 MG/L — HIGH
EGFR: 130 ML/MIN/1.73M2 — SIGNIFICANT CHANGE UP
EOSINOPHIL # BLD AUTO: 0.02 K/UL — SIGNIFICANT CHANGE UP (ref 0–0.5)
EOSINOPHIL NFR BLD AUTO: 0.2 % — SIGNIFICANT CHANGE UP (ref 0–6)
FERRITIN SERPL-MCNC: 166 NG/ML — SIGNIFICANT CHANGE UP (ref 30–400)
FOLATE SERPL-MCNC: 10 NG/ML — SIGNIFICANT CHANGE UP
GLUCOSE SERPL-MCNC: 119 MG/DL — HIGH (ref 70–99)
HCT VFR BLD CALC: 40.7 % — SIGNIFICANT CHANGE UP (ref 39–50)
HGB BLD-MCNC: 14 G/DL — SIGNIFICANT CHANGE UP (ref 13–17)
IMM GRANULOCYTES NFR BLD AUTO: 0.5 % — SIGNIFICANT CHANGE UP (ref 0–0.9)
IRON SATN MFR SERPL: 14 % — LOW (ref 16–55)
IRON SATN MFR SERPL: 33 UG/DL — LOW (ref 45–165)
LYMPHOCYTES # BLD AUTO: 1.16 K/UL — SIGNIFICANT CHANGE UP (ref 1–3.3)
LYMPHOCYTES # BLD AUTO: 12.3 % — LOW (ref 13–44)
MAGNESIUM SERPL-MCNC: 2.4 MG/DL — SIGNIFICANT CHANGE UP (ref 1.6–2.6)
MANUAL SMEAR VERIFICATION: SIGNIFICANT CHANGE UP
MCHC RBC-ENTMCNC: 32 PG — SIGNIFICANT CHANGE UP (ref 27–34)
MCHC RBC-ENTMCNC: 34.4 GM/DL — SIGNIFICANT CHANGE UP (ref 32–36)
MCV RBC AUTO: 93.1 FL — SIGNIFICANT CHANGE UP (ref 80–100)
MONOCYTES # BLD AUTO: 1.74 K/UL — HIGH (ref 0–0.9)
MONOCYTES NFR BLD AUTO: 18.5 % — HIGH (ref 2–14)
NEUTROPHILS # BLD AUTO: 6.44 K/UL — SIGNIFICANT CHANGE UP (ref 1.8–7.4)
NEUTROPHILS NFR BLD AUTO: 68.3 % — SIGNIFICANT CHANGE UP (ref 43–77)
PHOSPHATE SERPL-MCNC: 3.1 MG/DL — SIGNIFICANT CHANGE UP (ref 2.5–4.5)
PLAT MORPH BLD: NORMAL — SIGNIFICANT CHANGE UP
PLATELET # BLD AUTO: 456 K/UL — HIGH (ref 150–400)
POTASSIUM SERPL-MCNC: 4 MMOL/L — SIGNIFICANT CHANGE UP (ref 3.5–5.3)
POTASSIUM SERPL-SCNC: 4 MMOL/L — SIGNIFICANT CHANGE UP (ref 3.5–5.3)
PROT SERPL-MCNC: 6.4 GM/DL — SIGNIFICANT CHANGE UP (ref 6–8.3)
RBC # BLD: 4.37 M/UL — SIGNIFICANT CHANGE UP (ref 4.2–5.8)
RBC # FLD: 11.5 % — SIGNIFICANT CHANGE UP (ref 10.3–14.5)
RBC BLD AUTO: NORMAL — SIGNIFICANT CHANGE UP
SODIUM SERPL-SCNC: 139 MMOL/L — SIGNIFICANT CHANGE UP (ref 135–145)
TIBC SERPL-MCNC: 241 UG/DL — SIGNIFICANT CHANGE UP (ref 220–430)
TSH SERPL-MCNC: 1.21 UU/ML — SIGNIFICANT CHANGE UP (ref 0.34–4.82)
UIBC SERPL-MCNC: 208 UG/DL — SIGNIFICANT CHANGE UP (ref 110–370)
VIT B12 SERPL-MCNC: 894 PG/ML — SIGNIFICANT CHANGE UP (ref 232–1245)
WBC # BLD: 9.43 K/UL — SIGNIFICANT CHANGE UP (ref 3.8–10.5)
WBC # FLD AUTO: 9.43 K/UL — SIGNIFICANT CHANGE UP (ref 3.8–10.5)

## 2023-05-31 PROCEDURE — 99232 SBSQ HOSP IP/OBS MODERATE 35: CPT

## 2023-05-31 PROCEDURE — 45331 SIGMOIDOSCOPY AND BIOPSY: CPT

## 2023-05-31 RX ORDER — CHOLECALCIFEROL (VITAMIN D3) 125 MCG
2000 CAPSULE ORAL DAILY
Refills: 0 | Status: DISCONTINUED | OUTPATIENT
Start: 2023-05-31 | End: 2023-06-01

## 2023-05-31 RX ADMIN — PHENYLEPHRINE-SHARK LIVER OIL-MINERAL OIL-PETROLATUM RECTAL OINTMENT 1 APPLICATION(S): at 11:45

## 2023-05-31 RX ADMIN — Medication 1 APPLICATION(S): at 18:31

## 2023-05-31 RX ADMIN — Medication 1 APPLICATION(S): at 22:09

## 2023-05-31 RX ADMIN — Medication 2000 UNIT(S): at 15:33

## 2023-05-31 RX ADMIN — Medication 1 APPLICATION(S): at 09:40

## 2023-05-31 RX ADMIN — PHENYLEPHRINE-SHARK LIVER OIL-MINERAL OIL-PETROLATUM RECTAL OINTMENT 1 APPLICATION(S): at 06:00

## 2023-05-31 RX ADMIN — Medication 1 APPLICATION(S): at 09:39

## 2023-05-31 RX ADMIN — PHENYLEPHRINE-SHARK LIVER OIL-MINERAL OIL-PETROLATUM RECTAL OINTMENT 1 APPLICATION(S): at 19:04

## 2023-05-31 RX ADMIN — Medication 30 MILLIGRAM(S): at 09:36

## 2023-05-31 RX ADMIN — Medication 1 APPLICATION(S): at 18:30

## 2023-05-31 RX ADMIN — Medication 30 MILLIGRAM(S): at 22:04

## 2023-05-31 NOTE — PROGRESS NOTE ADULT - PROVIDER SPECIALTY LIST ADULT
Gastroenterology
Gastroenterology
Hospitalist
Gastroenterology
Hospitalist
Gastroenterology
Gastroenterology
Hospitalist
Hospitalist

## 2023-05-31 NOTE — CONSULT NOTE ADULT - ATTENDING COMMENTS
Patient seen and examined at bedside. As above patient does not wish to entertain discussions involving surgery at this time. Patient can follow up with colorectal surgery as needed.

## 2023-05-31 NOTE — CONSULT NOTE ADULT - ASSESSMENT
27M with Ulcerative Colitis  unable to complete consultation as patient refused any surgical evaluation-- stating "the power of Garcia Gill will save me, I won't need surgery"    surgery team will sign off at this time, please reconsult if patient becomes amenable to surgery    Plan discussed with Dr. Vaughn

## 2023-05-31 NOTE — PROGRESS NOTE ADULT - SUBJECTIVE AND OBJECTIVE BOX
CC: multiple bowel movements with blood and mucus        26 y/o Male w/PMHx of Crohn's disease,  IBS, anxiety disorder, esophageal reflux, hemorrhoids admitted  to  on 5/22/23  with  Crohn's flare.  Pt has been  on steroids for 5 days and reported left side and lower abdominal cramping  for five days with associated  bright red  blood per rectum.   He has been eating less as it causes more abd pain and stools.  He reports associated fatigue and  also light-headedness .        hospital course :   5/24 - tolerated FLD today but with persistent diarrhea ,  abdominal  pain is a little better   5/26 - about 15 BMS today; pt thinks he overate yesterday and today feels worse and eating and drinking less  5/28 - feels a bit better today; diarrhea improving; OOB    5/30 - pt seen and examined, chart reviewed, reports 15 BM a day , was up to 60 before admission, denies cp, dyspnea , palpitations, dizziness, abdominal pain worse after BMs, afebrile, can not tolerate PO K pill and do not IV form ,  will take foods pema in potassium   5/31- diarrhea improved 10-12 times in last 24 h, abdominal pain  better, tolerating po intake , plan for endoscopic evaluation and colorectal surgery evaluation discussed     Review of system- Rest of the review of system are negative except mentioned in HPI    Vital sings reviewed for last 24 h  T(C): 37.2 (05-31-23 @ 12:49), Max: 37.2 (05-31-23 @ 12:49)  T(F): 99 (05-31-23 @ 12:49), Max: 99 (05-31-23 @ 12:49)  HR: 71 (05-31-23 @ 12:49) (58 - 75)  BP: 134/73 (05-31-23 @ 12:49) (122/57 - 134/73)  RR: 18 (05-31-23 @ 12:49) (18 - 18)  SpO2: 99% (05-31-23 @ 12:49) (95% - 99%)  Wt(kg): --  Daily     Daily   CAPILLARY BLOOD GLUCOSE          Constitutional: NAD, awake and alert   HEENT: PERR, EOMI, Normal Hearing, MMM  Neck: Soft and supple, No LAD, No JVD  Respiratory: Breath sounds are clear bilaterally, No wheezing, rales or rhonchi  Cardiovascular: S1 and S2, regular rate and rhythm, no Murmurs, gallops or rubs  Gastrointestinal: Bowel Sounds present, soft, diffuse tenderness,  nondistended, no guarding, no rebound  Extremities: trace bilateral ankle peripheral edema  Vascular: 2+ peripheral pulses  Neurological: A/O x 3, no focal deficits  Musculoskeletal: 5/5 strength b/l upper and lower extremities  Skin: No rashes  rectal : deferred, not indicated      All labs radiology and other studies reviewed and interpreted :   05-31    139  |  105  |  7   ----------------------------<  119<H>  4.0   |  30  |  0.67    Ca    8.1<L>      31 May 2023 06:30  Phos  3.1     05-31  Mg     2.4     05-31    TPro  6.4  /  Alb  2.6<L>  /  TBili  0.3  /  DBili  x   /  AST  15  /  ALT  46  /  AlkPhos  36<L>  05-31                            14.0   9.43  )-----------( 456      ( 31 May 2023 06:30 )             40.7             LIVER FUNCTIONS - ( 31 May 2023 06:30 )  Alb: 2.6 g/dL / Pro: 6.4 gm/dL / ALK PHOS: 36 U/L / ALT: 46 U/L / AST: 15 U/L / GGT: x                   05-30    140  |  104  |  9   ----------------------------<  127<H>  3.3<L>   |  32<H>  |  0.55    Ca    8.0<L>      30 May 2023 07:39  Phos  2.8     05-29  Mg     2.5     05-30       CT Abdomen and Pelvis w/ IV Cont (05.26.23 @ 10:47) >  FINDINGS:  LOWER CHEST: Clear.    LIVER: Mild periportal edema.  BILE DUCTS: Nondilated.  GALLBLADDER: Normal.  SPLEEN: Normal.  PANCREAS: Normal.  ADRENALS: Normal.  KIDNEYS/URETERS: Symmetric nephrograms. No hydronephrosis.    BLADDER: Underdistended limiting evaluation.  REPRODUCTIVE ORGANS: Nonenlarged.    BOWEL: Pancolitis again seen with worsening of thickening and   inflammation in the ascending and transverse colon. No luminal distention   or pneumatosis. No evidence of ileitis or obstruction.  PERITONEUM: Small pelvic free fluid. No free air.  VESSELS: Normal caliber aorta.  RETROPERITONEUM/LYMPH NODES: No adenopathy.  ABDOMINAL WALL: Normal.  BONES: No acute bony abnormality.    IMPRESSION:  *  Pancolitis again seen with worsening of thickening and inflammation in   the ascending and transverse colon. No luminal distention or pneumatosis.  *  No evidence of ileitis or obstruction.        C-Reactive Protein, Serum: 33 mg/L (05-28-23 @ 06:32)  C-Reactive Protein, Serum: 40 mg/L (05-27-23 @ 07:29)  C-Reactive Protein, Serum: 32 mg/L (05-26-23 @ 08:26)  C-Reactive Protein, Serum: 51 mg/L (05-25-23 @ 06:20)  C-Reactive Protein, Serum: 120 mg/L (05-24-23 @ 07:02)  C-Reactive Protein, Serum: 88 mg/L (05-22-23 @ 15:00)    GI PCR Panel Stool (05.27.23 @ 19:30)    GI PCR Panel: NotDetec:    Clostridium difficile Toxin by PCR (05.27.23 @ 19:30)    C Diff by PCR Result: NotDetec:       MEDICATIONS  (STANDING):  AQUAPHOR (petrolatum Ointment) 1 Application(s) Topical two times a day  bacitracin   Ointment 1 Application(s) Topical three times a day  enoxaparin Injectable 40 milliGRAM(s) SubCutaneous every 24 hours  gabapentin 100 milliGRAM(s) Oral every 8 hours  hemorrhoidal Ointment 1 Application(s) Rectal every 6 hours  hydrocortisone 2.5% Rectal Cream 1 Application(s) Rectal three times a day  lidocaine   4% Patch 1 Patch Transdermal daily  mesalamine Suppository 1000 milliGRAM(s) Rectal at bedtime  methylPREDNISolone sodium succinate Injectable 30 milliGRAM(s) IV Push every 12 hours    MEDICATIONS  (PRN):  acetaminophen     Tablet .. 650 milliGRAM(s) Oral every 6 hours PRN Mild Pain (1 - 3)  ondansetron Injectable 4 milliGRAM(s) IV Push every 8 hours PRN Nausea and/or Vomiting  simethicone 80 milliGRAM(s) Chew four times a day PRN Gas

## 2023-05-31 NOTE — PROGRESS NOTE ADULT - ASSESSMENT
28 y/o Male w/PMHx of Crohn's disease,  IBS, anxiety disorder, esophageal reflux, hemorrhoids admitted  to  on 5/22/23  with  Crohn's flare.  Pt has been  on steroids for 5 days and reported left side and lower abdominal cramping  for five days with associated  bright red  blood per rectum.   He has been eating less as it causes more abd pain and stools.  He reports associated fatigue and  also light-headedness .       Diffuse  Abdominal pain with bloody diarrhea due to Crohn's pancolitis  flare  - CT abd noted -  thickening and inflammation in the ascending and transverse colon.   - CRP trending down 120--> 33 --> 9   - Leukocytosis , inflammatory due to recent prednisone  - s/p  IV hydrocortisone 100 q8h, now on solumedrol 30 mg q12h    - as per GI team plan to  dc on  prednisone 50 mg with 5mg weekly taper  - c/w mesalamine 1000 mg rectal supp  - diet advance to low residue   - DI consult - plan for endoscopic evaluation , will keep pt NPO   - 5/31 - colorectal surgery consult as per [facility] team recs    Rectal pain   - c/w hydrocortisone cream and hemorrhoid cream    Hypokalemia, Hypocalcemia  - pt refuses to take PO K , check vit D     Normocytic anemia due to lower GI bleed  - check iron studies, B12, folate  - monitor Hb daily    Dehydration, resolved  - s/p fluids, now some ankle edema  - monitor off IV fluids    Eczema of left hand   - c/w creams     DVT proph: lovenox      DISPO - cont PO diet,  IV steroids, endoscopic evaluation as per GI, colorectal surgeon consult     26 y/o Male w/PMHx of Crohn's disease,  IBS, anxiety disorder, esophageal reflux, hemorrhoids admitted  to  on 5/22/23  with  Crohn's flare.  Pt has been  on steroids for 5 days and reported left side and lower abdominal cramping  for five days with associated  bright red  blood per rectum.   He has been eating less as it causes more abd pain and stools.  He reports associated fatigue and  also light-headedness .       Diffuse  Abdominal pain with bloody diarrhea due to Crohn's pancolitis  flare  - CT abd noted -  thickening and inflammation in the ascending and transverse colon.   - CRP trending down 120--> 33 --> 9   - Leukocytosis , inflammatory due to recent prednisone  - s/p  IV hydrocortisone 100 q8h, now on solumedrol 30 mg q12h    - as per GI team plan to  dc on  prednisone 50 mg with 5mg weekly taper  - c/w mesalamine 1000 mg rectal supp  - diet advance to low residue   - DI consult - plan for endoscopic evaluation , will keep pt NPO   - 5/31 - colorectal surgery consult as per [facility] team recs    Rectal pain   - c/w hydrocortisone cream and hemorrhoid cream    Hypokalemia, Hypocalcemia  Vitamin D deficiency   - pt refuses to take PO K   - replace  vit D     Normocytic anemia due to lower GI bleed  - check iron studies, B12, folate  - monitor Hb daily    Dehydration, resolved  - s/p fluids, now some ankle edema  - monitor off IV fluids    Eczema of left hand   - c/w creams     DVT proph: lovenox      DISPO - cont PO diet,  IV steroids, endoscopic evaluation as per GI, colorectal surgeon consult

## 2023-05-31 NOTE — CONSULT NOTE ADULT - SUBJECTIVE AND OBJECTIVE BOX
27 year old man with anxiety, reflux, ulcerative colitis admitted with abdominal pain and diarrhea.     Per patient has been under increased stress as of late and 7 days ago had onset of predominantly LLQ abdominal cramping radiating into flank followed with numerous loose stools and rectal pain. Pain is crampy in nature, sudden onset, 5/10, waxes and wanes, without known exacerbating or alleviating factors.  This is also associated with rectal bleeding without clots. Spoke with GI office- and prescribed mesalamine supps and Uceris. Patient endorses that pain/cramping worsened and was aggravated by any oral intake of food or water. Usually consumed about 2800calories daily now down to about 400calories and limites liquids due to cramping/pain. Denies hematemesis or melena. Denies vomiting. Admits to fatigue. Does feel hungry but scared to eat due to discomfortf. Denies any sick contacts, recent travel, or consumption of undercooked raw meats. Does endorse history of biologics in the past. Remicade several years ago and self discontinued and more recently on Stelara in 2020 after thought to tb stress induced flare during height of Covid 2020. Stopped this on his own over a year ago. Has had flares in the past with good response to prednisone. Last colonoscopy within last 1-2 years per patient unremarkable. Currently with WBC 25 and CT showing pancolitis. IV tylenol with relief of pain. HH stable.  Colorectal surgery consulted due to severity of Crohn's colitis.    Vital Signs Last 24 Hrs  T(C): 37.2 (31 May 2023 12:49), Max: 37.2 (31 May 2023 12:49)  T(F): 99 (31 May 2023 12:49), Max: 99 (31 May 2023 12:49)  HR: 71 (31 May 2023 12:49) (58 - 75)  BP: 134/73 (31 May 2023 12:49) (122/57 - 134/73)  BP(mean): 74 (30 May 2023 22:22) (74 - 74)  RR: 18 (31 May 2023 12:49) (18 - 18)  SpO2: 99% (31 May 2023 12:49) (95% - 99%)    Parameters below as of 31 May 2023 12:49  Patient On (Oxygen Delivery Method): room air                            14.0   9.43  )-----------( 456      ( 31 May 2023 06:30 )             40.7     05-31    139  |  105  |  7   ----------------------------<  119<H>  4.0   |  30  |  0.67    Ca    8.1<L>      31 May 2023 06:30  Phos  3.1     05-31  Mg     2.4     05-31    TPro  6.4  /  Alb  2.6<L>  /  TBili  0.3  /  DBili  x   /  AST  15  /  ALT  46  /  AlkPhos  36<L>  05-31    I&O's Summary    30 May 2023 07:01  -  31 May 2023 07:00  --------------------------------------------------------  IN: 585 mL / OUT: 0 mL / NET: 585 mL    31 May 2023 07:01  -  31 May 2023 15:25  --------------------------------------------------------  IN: 325 mL / OUT: 1 mL / NET: 324 mL      Physical Exam deferred as patient refused surgical examination/evaluation    ACC: [id number] EXAM: CT ABDOMEN AND PELVIS IC ORDERED BY: BELLA JAIME    PROCEDURE DATE: 05/26/2023        INTERPRETATION: CLINICAL INFORMATION: Ulcerative colitis flare with pan colitis and worsening abdominal pain    COMPARISON: CT abdomen and pelvis 5/22/2023    CONTRAST/COMPLICATIONS:  IV Contrast: Omnipaque 350 90 cc administered 10 cc discarded  Oral Contrast: Omnipaque 300  Complications: None reported at time of study completion    PROCEDURE:  CT of the Abdomen and Pelvis was performed.  Sagittal and coronal reformats were performed.    FINDINGS:  LOWER CHEST: Clear.    LIVER: Mild periportal edema.  BILE DUCTS: Nondilated.  GALLBLADDER: Normal.  SPLEEN: Normal.  PANCREAS: Normal.  ADRENALS: Normal.  KIDNEYS/URETERS: Symmetric nephrograms. No hydronephrosis.    BLADDER: Underdistended limiting evaluation.  REPRODUCTIVE ORGANS: Nonenlarged.    BOWEL: Pancolitis again seen with worsening of thickening and inflammation in the ascending and transverse colon. No luminal distention or pneumatosis. No evidence of ileitis or obstruction.  PERITONEUM: Small pelvic free fluid. No free air.  VESSELS: Normal caliber aorta.  RETROPERITONEUM/LYMPH NODES: No adenopathy.  ABDOMINAL WALL: Normal.  BONES: No acute bony abnormality.    IMPRESSION:  * Pancolitis again seen with worsening of thickening and inflammation in the ascending and transverse colon. No luminal distention or pneumatosis.  * No evidence of ileitis or obstruction.

## 2023-05-31 NOTE — PROGRESS NOTE ADULT - NUTRITIONAL ASSESSMENT
This patient has been assessed with a concern for Malnutrition and has been determined to have a diagnosis/diagnoses of Severe protein-calorie malnutrition.    This patient is being managed with:   Diet Low Fiber-  Entered: May 25 2023 10:19AM  
This patient has been assessed with a concern for Malnutrition and has been determined to have a diagnosis/diagnoses of Severe protein-calorie malnutrition.    This patient is being managed with:   Diet Low Fiber-  Entered: May 28 2023 12:00PM  
This patient has been assessed with a concern for Malnutrition and has been determined to have a diagnosis/diagnoses of Severe protein-calorie malnutrition.    This patient is being managed with:   Diet Full Liquid-  Entered: May 24 2023  3:06PM  
This patient has been assessed with a concern for Malnutrition and has been determined to have a diagnosis/diagnoses of Severe protein-calorie malnutrition.    This patient is being managed with:   Diet Low Fiber-  Entered: May 25 2023 10:19AM  
This patient has been assessed with a concern for Malnutrition and has been determined to have a diagnosis/diagnoses of Severe protein-calorie malnutrition.    This patient is being managed with:   Diet Low Fiber-  Entered: May 28 2023 12:00PM  
This patient has been assessed with a concern for Malnutrition and has been determined to have a diagnosis/diagnoses of Severe protein-calorie malnutrition.    This patient is being managed with:   Diet Low Fiber-  Entered: May 28 2023 12:00PM  
This patient has been assessed with a concern for Malnutrition and has been determined to have a diagnosis/diagnoses of Severe protein-calorie malnutrition.    This patient is being managed with:   Diet NPO after Midnight-     NPO Start Date: 30-May-2023   NPO Start Time: 23:59  Entered: May 30 2023  3:56PM    Diet Low Fiber-  Entered: May 28 2023 12:00PM  
This patient has been assessed with a concern for Malnutrition and has been determined to have a diagnosis/diagnoses of Severe protein-calorie malnutrition.    This patient is being managed with:   Diet Regular-  Entered: May 27 2023  8:20PM

## 2023-05-31 NOTE — PROGRESS NOTE ADULT - REASON FOR ADMISSION
Abdominal pain  GI Bleed  Pancolitis  Crohn's Disease

## 2023-06-01 ENCOUNTER — TRANSCRIPTION ENCOUNTER (OUTPATIENT)
Age: 28
End: 2023-06-01

## 2023-06-01 VITALS
TEMPERATURE: 99 F | SYSTOLIC BLOOD PRESSURE: 121 MMHG | OXYGEN SATURATION: 97 % | DIASTOLIC BLOOD PRESSURE: 67 MMHG | HEART RATE: 92 BPM | RESPIRATION RATE: 18 BRPM

## 2023-06-01 LAB
ANION GAP SERPL CALC-SCNC: 5 MMOL/L — SIGNIFICANT CHANGE UP (ref 5–17)
BUN SERPL-MCNC: 12 MG/DL — SIGNIFICANT CHANGE UP (ref 7–23)
CALCIUM SERPL-MCNC: 8.4 MG/DL — LOW (ref 8.5–10.1)
CHLORIDE SERPL-SCNC: 101 MMOL/L — SIGNIFICANT CHANGE UP (ref 96–108)
CO2 SERPL-SCNC: 31 MMOL/L — SIGNIFICANT CHANGE UP (ref 22–31)
CREAT SERPL-MCNC: 0.66 MG/DL — SIGNIFICANT CHANGE UP (ref 0.5–1.3)
CRP SERPL-MCNC: 30 MG/L — HIGH
EGFR: 131 ML/MIN/1.73M2 — SIGNIFICANT CHANGE UP
GLUCOSE SERPL-MCNC: 106 MG/DL — HIGH (ref 70–99)
HCT VFR BLD CALC: 42.4 % — SIGNIFICANT CHANGE UP (ref 39–50)
HGB BLD-MCNC: 14.3 G/DL — SIGNIFICANT CHANGE UP (ref 13–17)
MAGNESIUM SERPL-MCNC: 2.4 MG/DL — SIGNIFICANT CHANGE UP (ref 1.6–2.6)
MCHC RBC-ENTMCNC: 31.6 PG — SIGNIFICANT CHANGE UP (ref 27–34)
MCHC RBC-ENTMCNC: 33.7 GM/DL — SIGNIFICANT CHANGE UP (ref 32–36)
MCV RBC AUTO: 93.8 FL — SIGNIFICANT CHANGE UP (ref 80–100)
PHOSPHATE SERPL-MCNC: 4.2 MG/DL — SIGNIFICANT CHANGE UP (ref 2.5–4.5)
PLATELET # BLD AUTO: 489 K/UL — HIGH (ref 150–400)
POTASSIUM SERPL-MCNC: 3.4 MMOL/L — LOW (ref 3.5–5.3)
POTASSIUM SERPL-SCNC: 3.4 MMOL/L — LOW (ref 3.5–5.3)
RBC # BLD: 4.52 M/UL — SIGNIFICANT CHANGE UP (ref 4.2–5.8)
RBC # FLD: 11.8 % — SIGNIFICANT CHANGE UP (ref 10.3–14.5)
SODIUM SERPL-SCNC: 137 MMOL/L — SIGNIFICANT CHANGE UP (ref 135–145)
WBC # BLD: 12.4 K/UL — HIGH (ref 3.8–10.5)
WBC # FLD AUTO: 12.4 K/UL — HIGH (ref 3.8–10.5)

## 2023-06-01 PROCEDURE — 99222 1ST HOSP IP/OBS MODERATE 55: CPT

## 2023-06-01 PROCEDURE — 99239 HOSP IP/OBS DSCHRG MGMT >30: CPT

## 2023-06-01 RX ORDER — HYDROCORTISONE 1 %
1 OINTMENT (GRAM) TOPICAL
Qty: 1 | Refills: 0
Start: 2023-06-01 | End: 2023-06-30

## 2023-06-01 RX ORDER — POTASSIUM CHLORIDE 20 MEQ
10 PACKET (EA) ORAL
Refills: 0 | Status: DISCONTINUED | OUTPATIENT
Start: 2023-06-01 | End: 2023-06-01

## 2023-06-01 RX ORDER — BUDESONIDE, MICRONIZED 100 %
1 POWDER (GRAM) MISCELLANEOUS
Refills: 0 | DISCHARGE

## 2023-06-01 RX ORDER — FERROUS SULFATE 325(65) MG
1 TABLET ORAL
Qty: 30 | Refills: 0
Start: 2023-06-01 | End: 2023-06-30

## 2023-06-01 RX ORDER — FERROUS SULFATE 325(65) MG
325 TABLET ORAL DAILY
Refills: 0 | Status: DISCONTINUED | OUTPATIENT
Start: 2023-06-01 | End: 2023-06-01

## 2023-06-01 RX ORDER — ACETAMINOPHEN 500 MG
2 TABLET ORAL
Qty: 0 | Refills: 0 | DISCHARGE
Start: 2023-06-01

## 2023-06-01 RX ORDER — CHOLECALCIFEROL (VITAMIN D3) 125 MCG
1 CAPSULE ORAL
Qty: 30 | Refills: 0
Start: 2023-06-01 | End: 2023-06-30

## 2023-06-01 RX ORDER — GLYCERIN, LIDOCAINE, PETROLATUM, AND PHENYLEPHRINE HYDROCHLORIDE 144; 50; 150; 2.5 MG/G; MG/G; MG/G; MG/G
1 CREAM TOPICAL
Qty: 2 | Refills: 0
Start: 2023-06-01 | End: 2023-06-30

## 2023-06-01 RX ORDER — POTASSIUM CHLORIDE 20 MEQ
1 PACKET (EA) ORAL
Qty: 4 | Refills: 0
Start: 2023-06-01 | End: 2023-06-04

## 2023-06-01 RX ORDER — GABAPENTIN 400 MG/1
1 CAPSULE ORAL
Qty: 90 | Refills: 0
Start: 2023-06-01 | End: 2023-06-30

## 2023-06-01 RX ORDER — SIMETHICONE 80 MG/1
1 TABLET, CHEWABLE ORAL
Qty: 120 | Refills: 0
Start: 2023-06-01 | End: 2023-06-30

## 2023-06-01 RX ADMIN — PHENYLEPHRINE-SHARK LIVER OIL-MINERAL OIL-PETROLATUM RECTAL OINTMENT 1 APPLICATION(S): at 10:14

## 2023-06-01 RX ADMIN — Medication 1 APPLICATION(S): at 10:05

## 2023-06-01 RX ADMIN — Medication 10 MILLIEQUIVALENT(S): at 10:52

## 2023-06-01 RX ADMIN — Medication 1 APPLICATION(S): at 10:04

## 2023-06-01 RX ADMIN — Medication 60 MILLIGRAM(S): at 10:47

## 2023-06-01 RX ADMIN — SIMETHICONE 80 MILLIGRAM(S): 80 TABLET, CHEWABLE ORAL at 10:47

## 2023-06-01 RX ADMIN — Medication 2000 UNIT(S): at 10:47

## 2023-06-01 RX ADMIN — PHENYLEPHRINE-SHARK LIVER OIL-MINERAL OIL-PETROLATUM RECTAL OINTMENT 1 APPLICATION(S): at 07:17

## 2023-06-01 NOTE — DISCHARGE NOTE PROVIDER - CARE PROVIDER_API CALL
Sidney Cheung  Gastroenterology  [address] [place] [address]  Phone: [phone]  Fax: [phone]  Follow Up Time: 1 week    Nitish Arteaga  Internal Medicine  [address] [place] [address]  Phone: [phone]  Fax: [phone]  Follow Up Time: 1 week

## 2023-06-01 NOTE — DISCHARGE NOTE PROVIDER - HOSPITAL COURSE
CC: multiple bowel movements with blood and mucus        26 y/o Male w/PMHx of Crohn's disease,  IBS, anxiety disorder, esophageal reflux, hemorrhoids admitted  to  on 5/22/23  with  Crohn's flare.  Pt has been  on steroids for 5 days and reported left side and lower abdominal cramping  for five days with associated  bright red  blood per rectum.   He has been eating less as it causes more abd pain and stools.  He reports associated fatigue and  also light-headedness .        hospital course :   5/24 - tolerated FLD today but with persistent diarrhea ,  abdominal  pain is a little better   5/26 - about 15 BMS today; pt thinks he overate yesterday and today feels worse and eating and drinking less  5/28 - feels a bit better today; diarrhea improving; OOB    5/30 - pt seen and examined, chart reviewed, reports 15 BM a day , was up to 60 before admission, denies cp, dyspnea , palpitations, dizziness, abdominal pain worse after BMs, afebrile, can not tolerate PO K pill and do not IV form ,  will take foods pema in potassium   5/31- diarrhea improved 10-12 times in last 24 h, abdominal pain  better, tolerating po intake , plan for endoscopic evaluation and colorectal surgery evaluation discussed   6/1 - diarrhea improving, denies cp, dyspnea, abdominal pain not severe, tolerating diet, plan discussed     Review of system- Rest of the review of system are negative except mentioned in HPI  Vital sings reviewed for last 24 h  T(C): 36.5 (06-01-23 @ 08:30), Max: 37.2 (05-31-23 @ 12:49)  T(F): 97.7 (06-01-23 @ 08:30), Max: 99 (05-31-23 @ 12:49)  HR: 79 (06-01-23 @ 08:30) (71 - 79)  BP: 129/68 (06-01-23 @ 08:30) (119/55 - 134/73)  RR: 18 (06-01-23 @ 08:30) (18 - 18)  SpO2: 99% (06-01-23 @ 08:30) (97% - 99%)  Constitutional: NAD, awake and alert   HEENT: PERR, EOMI, Normal Hearing, MMM  Neck: Soft and supple, No LAD, No JVD  Respiratory: Breath sounds are clear bilaterally, No wheezing, rales or rhonchi  Cardiovascular: S1 and S2, regular rate and rhythm, no Murmurs, gallops or rubs  Gastrointestinal: Bowel Sounds present, soft, diffuse tenderness,  nondistended, no guarding, no rebound  Extremities: trace bilateral ankle peripheral edema  Vascular: 2+ peripheral pulses  Neurological: A/O x 3, no focal deficits  Musculoskeletal: 5/5 strength b/l upper and lower extremities  Skin: No rashes  rectal : deferred, not indicated      All labs radiology and other studies reviewed and interpreted   · Assessment    26 y/o Male w/PMHx of Crohn's disease,  IBS, anxiety disorder, esophageal reflux, hemorrhoids admitted  to  on 5/22/23  with  Crohn's flare.  Pt has been  on steroids for 5 days and reported left side and lower abdominal cramping  for five days with associated  bright red  blood per rectum.   He has been eating less as it causes more abd pain and stools.  He reports associated fatigue and  also light-headedness .       Diffuse  Abdominal pain with bloody diarrhea due to Crohn's pancolitis  flare  - CT abd noted -  thickening and inflammation in the ascending and transverse colon.   - CRP trending down 120--> 33 --> 9  --> 12  - Leukocytosis , inflammatory due to recent prednisone  - s/p  IV hydrocortisone 100 q8h, now on solumedrol 30 mg q12h    - as per GI team plan to  dc on  prednisone 60 mg with taper as o/p   - c/w mesalamine 1000 mg rectal supp  - diet advance to low residue   - 5/31 - Ulcerative colitis. Inflammation was found from the  anus to the descending colon. This was severe.  the  findings are worsened compared to previous examinations.  - 6/1  - colorectal surgery consult  - pt refused evaluation     Rectal pain  - c/w hydrocortisone cream and hemorrhoid cream    Hypokalemia, Hypocalcemia - replace po     Vitamin D deficiency  -  replace  vit D     Normocytic anemia due to lower GI bleed, iron deficiency - daily po iron   - check iron studies, B12, folate noted ,  monitor Hb daily    Dehydration, resolved   - s/p fluids, now some ankle edema  - monitor off IV fluids    Eczema of left hand   - c/w creams   Final diagnosis, treatment plan, and follow-up recommendations were discussed and explained to the patient. The patient was given an opportunity to ask questions concerning the diagnosis and treatment plan.   The patient acknowledged understanding of the diagnosis, treatment, and follow-up recommendations.   The patient was advised to seek urgent care upon discharge if worsening symptoms develop prior to scheduled follow-up.   Time spent on discharge included time with the patient, and also coordinating discharge care as outlined below.  Discharge note faxed to PCP with my contact information to call me back   PCP Yuan Saldana  Total time spent: 50 min

## 2023-06-01 NOTE — DISCHARGE NOTE PROVIDER - NSDCCPTREATMENT_GEN_ALL_CORE_FT
PRINCIPAL PROCEDURE  Procedure: Flexible sigmoidoscopy  Findings and Treatment:   FINDING      Inflammation characterized by erythema, friability andserpentine   FINDING      ulcerations was found in a continuous and circumferential pattern from   FINDING      the anus to the descending colon. No sites were spared. This was severe,   FINDING      and when compared to previous examinations, the findings are worsened.  COMPLIC Complications:       No immediate complications. Estimated blood loss: None.  IMPRESS Impression:          - Ulcerative colitis. Inflammation was found from the   IMPRESS                      anus to the descending colon. This was severe. The   IMPRESS                      findings are worsened compared to previous examinations.  IMPRESS                      - No specimens collected.  ENDORECOMMENDATION Recommendation:      - The signs and symptoms of potential delayed   ENDORECOMMENDATION                      complications were discussed with the patient.  ENDORECOMMENDATION                      - Patient has a contact number available for emergencies.        SECONDARY PROCEDURE  Procedure: Abdomen CT  Findings and Treatment: FINDINGS:  LOWER CHEST: Clear.  LIVER: Mild periportal edema.  BILE DUCTS: Nondilated.  GALLBLADDER: Normal.  SPLEEN: Normal.  PANCREAS: Normal.  ADRENALS: Normal.  KIDNEYS/URETERS: Symmetric nephrograms. No hydronephrosis.  BLADDER: Underdistended limiting evaluation.  REPRODUCTIVE ORGANS: Nonenlarged.  BOWEL: Pancolitis again seen with worsening of thickening and   inflammation in the ascending and transverse colon. No luminal distention   or pneumatosis. No evidence of ileitis or obstruction.  PERITONEUM: Small pelvic free fluid. No free air.  VESSELS: Normal caliber aorta.  RETROPERITONEUM/LYMPH NODES: No adenopathy.  ABDOMINAL WALL: Normal.  BONES: No acute bony abnormality.  IMPRESSION:  *  Pancolitis again seen with worsening of thickening and inflammation in   the ascending and transverse colon. No luminal distention or pneumatosis.  *  No evidence of ileitis or obstruction.

## 2023-06-01 NOTE — DISCHARGE NOTE PROVIDER - PROVIDER TOKENS
PROVIDER:[TOKEN:[[id number]],FOLLOWUP:[1 week]],PROVIDER:[TOKEN:[[id number]],FOLLOWUP:[1 week]]

## 2023-06-01 NOTE — DISCHARGE NOTE PROVIDER - NSDCCPCAREPLAN_GEN_ALL_CORE_FT
PRINCIPAL DISCHARGE DIAGNOSIS  Diagnosis: Acute Crohn's disease  Assessment and Plan of Treatment: take prednisone 60 mg daily with food in am, rectal suppositorium as prescribed,  stop uceris for now,  follow up with Dr Cheung within 1 week for further recommendations and management  return to ED if worsening of abdominal pain, nausea, vomiting , inability to tolerate PO intake, fever, chills, or other concerns      SECONDARY DISCHARGE DIAGNOSES  Diagnosis: Abdominal pain  Assessment and Plan of Treatment: take tylenol as needed  continue gabapentin tid       Diagnosis: Iron deficiency anemia  Assessment and Plan of Treatment: take iron daily, follow up with your PCP for further monitoring , repeat blood work in 1 week    Diagnosis: Vitamin D deficiency  Assessment and Plan of Treatment: take vitamin D daily, repeat leve in 1 month    Diagnosis: Hypokalemia  Assessment and Plan of Treatment: take potassium supplements for 4 days, repeat blood work in 4-5 days with your PCP

## 2023-06-01 NOTE — DISCHARGE NOTE PROVIDER - NSDCMRMEDTOKEN_GEN_ALL_CORE_FT
mesalamine 1000 mg rectal suppository: 1 suppository(ies) rectal once a day (at bedtime)  Uceris 9 mg oral tablet, extended release: 1 tab(s) orally once a day   acetaminophen 325 mg oral tablet: 2 tab(s) orally every 6 hours As needed Mild Pain (1 - 3)  cholecalciferol 50 mcg (2000 intl units) oral tablet: 1 tab(s) orally once a day  ferrous sulfate 325 mg (65 mg elemental iron) oral tablet: 1 tab(s) orally once a day  gabapentin 100 mg oral capsule: 1 cap(s) orally every 8 hours  hydrocortisone 2.5% topical cream: Apply topically to affected area 3 times a day rectal cream for pain  mesalamine 1000 mg rectal suppository: 1 suppository(ies) rectal once a day (at bedtime)  Potassium Chloride (Eqv-Klor-Con M20) 20 mEq oral tablet, extended release: 1 tab(s) orally once a day  predniSONE 20 mg oral tablet: 3 tab(s) orally once a day follow up with GI for further taper instruction in 1 week  Preparation H Rapid Relief With Lidocaine topical cream: Apply topically to affected area every 6 hours  simethicone 80 mg oral tablet, chewable: 1 tab(s) orally 4 times a day as needed for Gas

## 2023-06-01 NOTE — DISCHARGE NOTE NURSING/CASE MANAGEMENT/SOCIAL WORK - PATIENT PORTAL LINK FT
You can access the FollowMyHealth Patient Portal offered by [facility] by registering at the following website: [email or web address]. By joining [employer]’s FollowMyHealth portal, you will also be able to view your health information using other applications (apps) compatible with our system.

## 2023-06-02 LAB — CALPROTECTIN STL-MCNT: 2700 UG/G — HIGH (ref 0–120)

## 2023-06-05 ENCOUNTER — LABORATORY RESULT (OUTPATIENT)
Age: 28
End: 2023-06-05

## 2023-06-05 ENCOUNTER — APPOINTMENT (OUTPATIENT)
Dept: GASTROENTEROLOGY | Facility: CLINIC | Age: 28
End: 2023-06-05
Payer: COMMERCIAL

## 2023-06-05 VITALS
HEIGHT: 67 IN | SYSTOLIC BLOOD PRESSURE: 134 MMHG | WEIGHT: 121 LBS | HEART RATE: 67 BPM | DIASTOLIC BLOOD PRESSURE: 76 MMHG | BODY MASS INDEX: 18.99 KG/M2

## 2023-06-05 PROCEDURE — 99214 OFFICE O/P EST MOD 30 MIN: CPT

## 2023-06-08 ENCOUNTER — NON-APPOINTMENT (OUTPATIENT)
Age: 28
End: 2023-06-08

## 2023-06-08 LAB
ALBUMIN SERPL ELPH-MCNC: 3.7 G/DL
ALP BLD-CCNC: 45 U/L
ALT SERPL-CCNC: 32 U/L
ANION GAP SERPL CALC-SCNC: 12 MMOL/L
AST SERPL-CCNC: 14 U/L
BILIRUB SERPL-MCNC: 0.4 MG/DL
BUN SERPL-MCNC: 15 MG/DL
CALCIUM SERPL-MCNC: 9.2 MG/DL
CHLORIDE SERPL-SCNC: 93 MMOL/L
CO2 SERPL-SCNC: 27 MMOL/L
CREAT SERPL-MCNC: 0.78 MG/DL
CRP SERPL-MCNC: 25 MG/L
EGFR: 125 ML/MIN/1.73M2
ERYTHROCYTE [SEDIMENTATION RATE] IN BLOOD BY WESTERGREN METHOD: 10 MM/HR
GLUCOSE SERPL-MCNC: 213 MG/DL
HBV E AG SER QL: NONREACTIVE
HBV SURFACE AG SER QL: NONREACTIVE
HCV AB SER QL: NONREACTIVE
HCV S/CO RATIO: 0.1 S/CO
M TB IFN-G BLD-IMP: ABNORMAL
POTASSIUM SERPL-SCNC: 5.1 MMOL/L
PROT SERPL-MCNC: 6.1 G/DL
QUANTIFERON TB PLUS MITOGEN MINUS NIL: 0.08 IU/ML
QUANTIFERON TB PLUS NIL: 0.02 IU/ML
QUANTIFERON TB PLUS TB1 MINUS NIL: -0.01 IU/ML
QUANTIFERON TB PLUS TB2 MINUS NIL: 0 IU/ML
SODIUM SERPL-SCNC: 133 MMOL/L

## 2023-06-08 RX ORDER — POTASSIUM CHLORIDE 750 MG/1
10 TABLET, EXTENDED RELEASE ORAL
Qty: 15 | Refills: 0 | Status: ACTIVE | COMMUNITY
Start: 2023-06-08 | End: 1900-01-01

## 2023-06-08 NOTE — HISTORY OF PRESENT ILLNESS
[FreeTextEntry1] : 26yo male with colitis\par \par He recently got our  hospital where treated for colitis flare for over a week\par \par He is on prednisone 60mg with decreased pain and BM but still going multiple times per day\par He had been on remission stelara and stopped 2 years ago and was ok until recent severe flare\par \par

## 2023-06-08 NOTE — PHYSICAL EXAM
[Alert] : alert [Normal Voice/Communication] : normal voice/communication [Healthy Appearing] : healthy appearing [No Acute Distress] : no acute distress [Sclera] : the sclera and conjunctiva were normal [Hearing Threshold Finger Rub Not [place]] : hearing was normal [Normal Lips/Gums] : the lips and gums were normal [Oropharynx] : the oropharynx was normal [Normal Appearance] : the appearance of the neck was normal [No Neck Mass] : no neck mass was observed [No Respiratory Distress] : no respiratory distress [No Acc Muscle Use] : no accessory muscle use [Respiration, Rhythm And Depth] : normal respiratory rhythm and effort [Auscultation Breath Sounds / Voice Sounds] : lungs were clear to auscultation bilaterally [Heart Rate And Rhythm] : heart rate was normal and rhythm regular [Normal S1, S2] : normal S1 and S2 [Murmurs] : no murmurs [Bowel Sounds] : normal bowel sounds [Abdomen Tenderness] : non-tender [No Masses] : no abdominal mass palpated [Abdomen Soft] : soft [] : no hepatosplenomegaly [Oriented To Time, Place, And Person] : oriented to person, place, and time

## 2023-06-08 NOTE — ASSESSMENT
[FreeTextEntry1] : 28yo male with colitis\par \par he is doing better on prednisone but we discussed need to restart biologics\par He failed remicade several years ago. \par After discussion considering options we have agreed to restart stelara\par he is unable to work during flare and will fill out appropriate paperwork\par will check labs with tb and hep serologies prior to starting biologic\par \par Total time spent 33 minutes, including record review, face to face time, and documentation\par \par

## 2023-06-09 NOTE — CHART NOTE - NSCHARTNOTEFT_GEN_A_CORE
In Dietitian Initial Assessment on 5/23,   Patient Physical Assessment  "well nourished" was accidentally checked off.  Pt was not "well nourished."  Pt was at risk for and diagnosed with severe malnutrition.
Visited with pt several times today, he was asleep one time, in bathroom two other times.  When asked if he had started his clear diet yet (yesterday he was reluctant due to pain and discomfort, no n/v)  He reported that he had not, but was going to start.  Unable to get further information from patient  If pt unable to start on clear liquids today, suggest initiate PPN.    Initiate PO Intake when medically feasible  Monitor labs, meds, wt

## 2023-06-12 DIAGNOSIS — K51.011 ULCERATIVE (CHRONIC) PANCOLITIS WITH RECTAL BLEEDING: ICD-10-CM

## 2023-06-12 DIAGNOSIS — D72.828 OTHER ELEVATED WHITE BLOOD CELL COUNT: ICD-10-CM

## 2023-06-12 DIAGNOSIS — F32.A DEPRESSION, UNSPECIFIED: ICD-10-CM

## 2023-06-12 DIAGNOSIS — E43 UNSPECIFIED SEVERE PROTEIN-CALORIE MALNUTRITION: ICD-10-CM

## 2023-06-12 DIAGNOSIS — T38.0X5A ADVERSE EFFECT OF GLUCOCORTICOIDS AND SYNTHETIC ANALOGUES, INITIAL ENCOUNTER: ICD-10-CM

## 2023-06-12 DIAGNOSIS — D50.0 IRON DEFICIENCY ANEMIA SECONDARY TO BLOOD LOSS (CHRONIC): ICD-10-CM

## 2023-06-12 DIAGNOSIS — K58.9 IRRITABLE BOWEL SYNDROME WITHOUT DIARRHEA: ICD-10-CM

## 2023-06-12 DIAGNOSIS — Z20.822 CONTACT WITH AND (SUSPECTED) EXPOSURE TO COVID-19: ICD-10-CM
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
## 2023-06-12 DIAGNOSIS — K64.9 UNSPECIFIED HEMORRHOIDS: ICD-10-CM

## 2023-06-12 DIAGNOSIS — E83.51 HYPOCALCEMIA: ICD-10-CM

## 2023-06-12 DIAGNOSIS — K21.9 GASTRO-ESOPHAGEAL REFLUX DISEASE WITHOUT ESOPHAGITIS: ICD-10-CM

## 2023-06-12 DIAGNOSIS — L30.9 DERMATITIS, UNSPECIFIED: ICD-10-CM

## 2023-06-12 DIAGNOSIS — K50.111 CROHN'S DISEASE OF LARGE INTESTINE WITH RECTAL BLEEDING: ICD-10-CM

## 2023-06-12 DIAGNOSIS — E87.6 HYPOKALEMIA: ICD-10-CM

## 2023-06-12 DIAGNOSIS — K62.89 OTHER SPECIFIED DISEASES OF ANUS AND RECTUM: ICD-10-CM

## 2023-06-12 DIAGNOSIS — F41.9 ANXIETY DISORDER, UNSPECIFIED: ICD-10-CM
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
## 2023-06-12 DIAGNOSIS — R60.0 LOCALIZED EDEMA: ICD-10-CM

## 2023-06-12 DIAGNOSIS — E55.9 VITAMIN D DEFICIENCY, UNSPECIFIED: ICD-10-CM

## 2023-06-12 DIAGNOSIS — E86.0 DEHYDRATION: ICD-10-CM

## 2023-06-12 DIAGNOSIS — Z87.19 PERSONAL HISTORY OF OTHER DISEASES OF THE DIGESTIVE SYSTEM: ICD-10-CM

## 2023-06-13 RX ORDER — PREDNISONE 10 MG/1
10 TABLET ORAL
Qty: 60 | Refills: 0 | Status: ACTIVE | COMMUNITY
Start: 2020-05-05 | End: 1900-01-01

## 2023-06-14 ENCOUNTER — NON-APPOINTMENT (OUTPATIENT)
Age: 28
End: 2023-06-14

## 2023-06-16 ENCOUNTER — NON-APPOINTMENT (OUTPATIENT)
Age: 28
End: 2023-06-16

## 2023-06-21 LAB
ANION GAP SERPL CALC-SCNC: 14 MMOL/L
BUN SERPL-MCNC: 15 MG/DL
CALCIUM SERPL-MCNC: 8.7 MG/DL
CHLORIDE SERPL-SCNC: 98 MMOL/L
CO2 SERPL-SCNC: 29 MMOL/L
CREAT SERPL-MCNC: 0.71 MG/DL
EGFR: 128 ML/MIN/1.73M2
GLUCOSE SERPL-MCNC: 117 MG/DL
POTASSIUM SERPL-SCNC: 4.1 MMOL/L
SODIUM SERPL-SCNC: 141 MMOL/L

## 2023-06-22 ENCOUNTER — APPOINTMENT (OUTPATIENT)
Dept: RHEUMATOLOGY | Facility: CLINIC | Age: 28
End: 2023-06-22
Payer: COMMERCIAL

## 2023-06-22 VITALS
HEART RATE: 90 BPM | RESPIRATION RATE: 18 BRPM | SYSTOLIC BLOOD PRESSURE: 128 MMHG | TEMPERATURE: 98 F | DIASTOLIC BLOOD PRESSURE: 78 MMHG | OXYGEN SATURATION: 98 %

## 2023-06-22 PROCEDURE — 96413 CHEMO IV INFUSION 1 HR: CPT

## 2023-06-22 RX ORDER — USTEKINUMAB 130 MG/26ML
130 SOLUTION INTRAVENOUS
Qty: 0 | Refills: 0 | Status: COMPLETED | OUTPATIENT
Start: 2023-06-13

## 2023-06-22 NOTE — HISTORY OF PRESENT ILLNESS
[N/A] : N/A [Denies] : Denies [No] : No [Yes] : Yes [TB] : Tuberculosis screening [Hep acute panel] : Hepatitis acute panel [de-identified] : reloading IV dose today [Right upper extremity] : Right upper extremity [24g] : 24g [Start Time: ___] : Medication Start Time: [unfilled] [End Time: ___] : Medication End Time: [unfilled] [IV discontinued. Intact. No signs or symptoms of IV complications noted. Time: ___] : IV discontinued. Intact. No signs or symptoms of IV complications noted. Time: [unfilled] [Patient  instructed to seek medical attention with signs and symptoms of adverse effects] : Patient  instructed to seek medical attention with signs and symptoms of adverse effects [Patient left unit in no acute distress] : Patient left unit in no acute distress [Medications administered as ordered and tolerated well.] : Medications administered as ordered and tolerated well. [de-identified] : 11:20 am

## 2023-08-19 NOTE — HISTORY OF PRESENT ILLNESS
[FreeTextEntry1] : Yohan presents to the office for followup of his UC and h/o left anterior perianal abscess that had been incised and drained. 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 He reports intermittent episodes of anal pain and swelling, and reports occasional drainage. He was seen and evaluated by Dr. Sirisha Mccarty and since no external fistulous opening could be identified on examination, recommendations were made for an operative EUA  to further elucidate the presence of anal fistula. As he was unable to schedule an EUA as readily as desired, he is here in office today as he would like confirmation regarding the presence of an external fistulous opening. He continues on remicade for treatment of his UC.\par \par 3/10/21 Yohan returns to the office for a consultation.  I had last seen him in office approximately 4 years ago for a left anterior perianal abscess.  He states that in the interim, he discontinued Remicade infusions which she has been receiving for management of his UC.  Around March or April 2020, when Covid pandemic first arrived, he began experiencing another flare.  He underwent a repeat colonoscopy with Dr. Tucker in [facility] approximately June 2020.  He was started on Stelara per recommendations.  He was feeling well and had symptoms under control until the end of December 2020 when he contracted Covid.  He had mild symptoms, but notably developed diarrhea.  The stools are passed approximately 6-8 times during the day and are loose in consistency though over the course of the last month, have gradually thickened and are now mushy in consistency.  He continues on the Stelara and is here for me to determine whether he has any evidence of an anorectal fistula and what my thoughts are with regards to his current management and options for addressing this diarrhea, whether it is postinfectious in nature versus an IBD flare.  He also notes that it appears as if he has Crohn's disease, and not UC.  He otherwise denies any issues with hemorrhoidal burning, itching or bleeding.  There is no anorectal pain though there is some abdominal cramping, discomfort and gassiness.  He has been on a bland diet and has recently begun eating more fruits and vegetables. Statement Selected

## 2023-09-07 ENCOUNTER — APPOINTMENT (OUTPATIENT)
Dept: GASTROENTEROLOGY | Facility: CLINIC | Age: 28
End: 2023-09-07
Payer: COMMERCIAL

## 2023-09-07 VITALS
BODY MASS INDEX: 22.76 KG/M2 | WEIGHT: 145 LBS | DIASTOLIC BLOOD PRESSURE: 78 MMHG | HEIGHT: 67 IN | HEART RATE: 87 BPM | SYSTOLIC BLOOD PRESSURE: 130 MMHG

## 2023-09-07 DIAGNOSIS — R10.13 EPIGASTRIC PAIN: ICD-10-CM

## 2023-09-07 PROCEDURE — 99214 OFFICE O/P EST MOD 30 MIN: CPT

## 2023-09-07 RX ORDER — OMEPRAZOLE 20 MG/1
20 TABLET, DELAYED RELEASE ORAL
Qty: 90 | Refills: 1 | Status: ACTIVE | COMMUNITY
Start: 1900-01-01 | End: 1900-01-01

## 2023-09-10 NOTE — PHYSICAL EXAM

## 2023-09-10 NOTE — HISTORY OF PRESENT ILLNESS
[FreeTextEntry1] : 26yo male with crohns, colitis  overall he is doing much better on stelara Essentially with normal BMs without bleeding Intermittent issues with dyspepsia - had been off PPi

## 2023-09-10 NOTE — HISTORY OF PRESENT ILLNESS
[FreeTextEntry1] : 28yo male with crohns, colitis  overall he is doing much better on stelara Essentially with normal BMs without bleeding Intermittent issues with dyspepsia - had been off PPi

## 2023-09-10 NOTE — PHYSICAL EXAM

## 2023-09-10 NOTE — ASSESSMENT
[FreeTextEntry1] : 26yo male with dyspepsia, colitis  will continue stelara will refill omeprazole  will check colonoscopy Risks and benefits of procedure(s) discussed with patient in detail, including but not limited to, perforation, bleeding, reaction to anesthesia, missed lesions.

## 2023-09-21 NOTE — H&P ADULT - HISTORY OF PRESENT ILLNESS
22 yo male with pmh  recently diagnosed ulcerative colitis presents with complaints of rectal pain, bright serg blood per rectum, mid abd pain.   denies fevers/recent travel. Recently underwent colonoscopy 3 days ago with dr Cheung who discovered inflammatory changes throughout the colon      ED course: WBC 13.9. Afebrile. Solumderol/morphine given Debridement Text: The wound edges were debrided prior to proceeding with the closure to facilitate wound healing.

## 2023-12-04 NOTE — DIETITIAN INITIAL EVALUATION ADULT - NS FNS DIET ORDER
Patient woke up yesterday with a sore throat, went to work and developed a migraine. Then pt went home with headache, nausea and fever.  Symptoms persisted so today pt went to urgent care and tested negative for strep, influenza, and UA. At urgent care pt received Toradol and Zofran around 1800. Pt reports nausea has since improved but still has a 8/10 headache.    Adam Ayala RN on 12/4/2023 at 12:11 AM          
Diet, Clear Liquid (05-22-23 @ 18:09)

## 2023-12-11 RX ORDER — SODIUM SULFATE, POTASSIUM SULFATE AND MAGNESIUM SULFATE 1.6; 3.13; 17.5 G/177ML; G/177ML; G/177ML
17.5-3.13-1.6 SOLUTION ORAL
Qty: 1 | Refills: 0 | Status: ACTIVE | COMMUNITY
Start: 2023-12-11 | End: 1900-01-01

## 2023-12-13 ENCOUNTER — APPOINTMENT (OUTPATIENT)
Dept: GASTROENTEROLOGY | Facility: AMBULATORY MEDICAL SERVICES | Age: 28
End: 2023-12-13
Payer: COMMERCIAL

## 2023-12-13 ENCOUNTER — RESULT REVIEW (OUTPATIENT)
Age: 28
End: 2023-12-13

## 2023-12-13 PROCEDURE — 45385 COLONOSCOPY W/LESION REMOVAL: CPT | Mod: GC

## 2023-12-13 PROCEDURE — 45380 COLONOSCOPY AND BIOPSY: CPT | Mod: GC,59

## 2024-06-07 ENCOUNTER — RX RENEWAL (OUTPATIENT)
Age: 29
End: 2024-06-07

## 2024-06-07 RX ORDER — USTEKINUMAB 90 MG/ML
90 INJECTION, SOLUTION SUBCUTANEOUS
Qty: 1 | Refills: 0 | Status: ACTIVE | COMMUNITY
Start: 2023-06-13 | End: 1900-01-01

## 2024-06-11 ENCOUNTER — APPOINTMENT (OUTPATIENT)
Dept: GASTROENTEROLOGY | Facility: CLINIC | Age: 29
End: 2024-06-11
Payer: COMMERCIAL

## 2024-06-11 VITALS
BODY MASS INDEX: 21.97 KG/M2 | WEIGHT: 140 LBS | HEIGHT: 67 IN | DIASTOLIC BLOOD PRESSURE: 70 MMHG | SYSTOLIC BLOOD PRESSURE: 142 MMHG

## 2024-06-11 DIAGNOSIS — K51.90 ULCERATIVE COLITIS, UNSPECIFIED, W/OUT COMPLICATIONS: ICD-10-CM

## 2024-06-11 DIAGNOSIS — K21.9 GASTRO-ESOPHAGEAL REFLUX DISEASE W/OUT ESOPHAGITIS: ICD-10-CM

## 2024-06-11 LAB
HCT VFR BLD CALC: 47.1 %
HGB BLD-MCNC: 15.4 G/DL
MCHC RBC-ENTMCNC: 32.2 PG
MCHC RBC-ENTMCNC: 32.7 GM/DL
MCV RBC AUTO: 98.3 FL
PLATELET # BLD AUTO: 285 K/UL
RBC # BLD: 4.79 M/UL
RBC # FLD: 12.1 %
WBC # FLD AUTO: 5.89 K/UL

## 2024-06-11 PROCEDURE — 99214 OFFICE O/P EST MOD 30 MIN: CPT

## 2024-06-11 NOTE — ASSESSMENT
[FreeTextEntry1] : 29yo male with colitis, gerd  continue stelara will check surveillance labs as do for annual check  gerd- continue pepcid pen

## 2024-06-11 NOTE — PHYSICAL EXAM

## 2024-06-11 NOTE — HISTORY OF PRESENT ILLNESS
[FreeTextEntry1] : 27yo male with colitis  He is largely doing well on Stelara BM normal without bleeding and no significant pain  He has intermittent GERD and takes pepcid prn which is effective Last colonoscopy reviewed about 6 months ago

## 2024-06-13 LAB
ALBUMIN SERPL ELPH-MCNC: 5 G/DL
ALP BLD-CCNC: 42 U/L
ALT SERPL-CCNC: 23 U/L
ANION GAP SERPL CALC-SCNC: 14 MMOL/L
AST SERPL-CCNC: 38 U/L
BILIRUB SERPL-MCNC: 0.8 MG/DL
BUN SERPL-MCNC: 14 MG/DL
CALCIUM SERPL-MCNC: 9.6 MG/DL
CHLORIDE SERPL-SCNC: 100 MMOL/L
CO2 SERPL-SCNC: 25 MMOL/L
CREAT SERPL-MCNC: 0.84 MG/DL
EGFR: 121 ML/MIN/1.73M2
GLUCOSE SERPL-MCNC: 84 MG/DL
HBV SURFACE AG SER QL: NONREACTIVE
HCV AB SER QL: NONREACTIVE
HCV S/CO RATIO: 0.1 S/CO
IRON SERPL-MCNC: 237 UG/DL
M TB IFN-G BLD-IMP: NEGATIVE
POTASSIUM SERPL-SCNC: 4.7 MMOL/L
PROT SERPL-MCNC: 7.5 G/DL
QUANTIFERON TB PLUS MITOGEN MINUS NIL: >10 IU/ML
QUANTIFERON TB PLUS NIL: 0.02 IU/ML
QUANTIFERON TB PLUS TB1 MINUS NIL: 0 IU/ML
QUANTIFERON TB PLUS TB2 MINUS NIL: 0 IU/ML
SODIUM SERPL-SCNC: 139 MMOL/L

## 2024-08-19 ENCOUNTER — RX RENEWAL (OUTPATIENT)
Age: 29
End: 2024-08-19

## 2024-08-29 ENCOUNTER — APPOINTMENT (OUTPATIENT)
Dept: DERMATOLOGY | Facility: CLINIC | Age: 29
End: 2024-08-29

## 2024-09-18 ENCOUNTER — RX RENEWAL (OUTPATIENT)
Age: 29
End: 2024-09-18

## 2024-09-18 ENCOUNTER — NON-APPOINTMENT (OUTPATIENT)
Age: 29
End: 2024-09-18

## 2024-09-18 LAB
ALBUMIN SERPL ELPH-MCNC: 4.7 G/DL
ALP BLD-CCNC: 43 U/L
ALT SERPL-CCNC: 13 U/L
ANION GAP SERPL CALC-SCNC: 11 MMOL/L
AST SERPL-CCNC: 17 U/L
BASOPHILS # BLD AUTO: 0.05 K/UL
BASOPHILS NFR BLD AUTO: 0.7 %
BILIRUB SERPL-MCNC: 0.4 MG/DL
BUN SERPL-MCNC: 16 MG/DL
CALCIUM SERPL-MCNC: 9.4 MG/DL
CHLORIDE SERPL-SCNC: 100 MMOL/L
CO2 SERPL-SCNC: 28 MMOL/L
CREAT SERPL-MCNC: 0.89 MG/DL
CRP SERPL-MCNC: <3 MG/L
EGFR: 119 ML/MIN/1.73M2
EOSINOPHIL # BLD AUTO: 0.46 K/UL
EOSINOPHIL NFR BLD AUTO: 6.4 %
ERYTHROCYTE [SEDIMENTATION RATE] IN BLOOD BY WESTERGREN METHOD: < 2 MM/HR
GLUCOSE SERPL-MCNC: 82 MG/DL
HCT VFR BLD CALC: 47 %
HGB BLD-MCNC: 15.7 G/DL
IMM GRANULOCYTES NFR BLD AUTO: 0.3 %
LYMPHOCYTES # BLD AUTO: 1.35 K/UL
LYMPHOCYTES NFR BLD AUTO: 18.7 %
MAN DIFF?: NORMAL
MCHC RBC-ENTMCNC: 32.7 PG
MCHC RBC-ENTMCNC: 33.4 GM/DL
MCV RBC AUTO: 97.9 FL
MONOCYTES # BLD AUTO: 0.66 K/UL
MONOCYTES NFR BLD AUTO: 9.1 %
NEUTROPHILS # BLD AUTO: 4.69 K/UL
NEUTROPHILS NFR BLD AUTO: 64.8 %
PLATELET # BLD AUTO: 297 K/UL
POTASSIUM SERPL-SCNC: 4.7 MMOL/L
PROT SERPL-MCNC: 7 G/DL
RBC # BLD: 4.8 M/UL
RBC # FLD: 11.8 %
SODIUM SERPL-SCNC: 140 MMOL/L
WBC # FLD AUTO: 7.23 K/UL

## 2024-09-20 ENCOUNTER — NON-APPOINTMENT (OUTPATIENT)
Age: 29
End: 2024-09-20

## 2024-09-20 LAB — CALPROTECTIN FECAL: 699 UG/G

## 2024-09-20 RX ORDER — PREDNISONE 10 MG/1
10 TABLET ORAL
Qty: 50 | Refills: 0 | Status: ACTIVE | COMMUNITY
Start: 2024-09-20 | End: 1900-01-01

## 2024-10-30 NOTE — PATIENT PROFILE ADULT - HAVE YOU BEEN EATING POORLY BECAUSE OF A DECREASED APPETITE?
Quality 137: Melanoma: Continuity Of Care - Recall System: Patient information entered into a recall system that includes: target date for the next exam specified AND a process to follow up with patients regarding missed or unscheduled appointments
Detail Level: Generalized
Quality 130: Documentation Of Current Medications In The Medical Record: Current Medications Documented
Quality 226: Preventive Care And Screening: Tobacco Use: Screening And Cessation Intervention: Patient screened for tobacco use and is an ex/non-smoker
Quality 47: Advance Care Plan: Advance Care Planning discussed and documented; advance care plan or surrogate decision maker documented in the medical record.
No (0)

## 2024-12-10 ENCOUNTER — NON-APPOINTMENT (OUTPATIENT)
Age: 29
End: 2024-12-10

## 2024-12-10 ENCOUNTER — APPOINTMENT (OUTPATIENT)
Dept: GASTROENTEROLOGY | Facility: CLINIC | Age: 29
End: 2024-12-10
Payer: COMMERCIAL

## 2024-12-10 VITALS
DIASTOLIC BLOOD PRESSURE: 76 MMHG | WEIGHT: 140 LBS | HEIGHT: 67 IN | BODY MASS INDEX: 21.97 KG/M2 | SYSTOLIC BLOOD PRESSURE: 120 MMHG

## 2024-12-10 DIAGNOSIS — R19.7 DIARRHEA, UNSPECIFIED: ICD-10-CM

## 2024-12-10 DIAGNOSIS — K50.811 CROHN'S DISEASE OF BOTH SMALL AND LARGE INTESTINE WITH RECTAL BLEEDING: ICD-10-CM

## 2024-12-10 PROCEDURE — 99214 OFFICE O/P EST MOD 30 MIN: CPT

## 2025-02-10 ENCOUNTER — NON-APPOINTMENT (OUTPATIENT)
Age: 30
End: 2025-02-10

## 2025-02-10 LAB — CALPROTECTIN FECAL: 223 UG/G

## 2025-02-21 DIAGNOSIS — K62.5 HEMORRHAGE OF ANUS AND RECTUM: ICD-10-CM

## 2025-02-21 DIAGNOSIS — R10.9 UNSPECIFIED ABDOMINAL PAIN: ICD-10-CM

## 2025-03-04 ENCOUNTER — APPOINTMENT (OUTPATIENT)
Dept: CT IMAGING | Facility: CLINIC | Age: 30
End: 2025-03-04

## 2025-03-11 ENCOUNTER — APPOINTMENT (OUTPATIENT)
Dept: GASTROENTEROLOGY | Facility: CLINIC | Age: 30
End: 2025-03-11
Payer: COMMERCIAL

## 2025-03-11 ENCOUNTER — NON-APPOINTMENT (OUTPATIENT)
Age: 30
End: 2025-03-11

## 2025-03-11 VITALS
BODY MASS INDEX: 20.4 KG/M2 | DIASTOLIC BLOOD PRESSURE: 74 MMHG | SYSTOLIC BLOOD PRESSURE: 124 MMHG | HEIGHT: 67 IN | WEIGHT: 130 LBS

## 2025-03-11 DIAGNOSIS — Z09 ENCOUNTER FOR FOLLOW-UP EXAMINATION AFTER COMPLETED TREATMENT FOR CONDITIONS OTHER THAN MALIGNANT NEOPLASM: ICD-10-CM

## 2025-03-11 DIAGNOSIS — Z71.9 COUNSELING, UNSPECIFIED: ICD-10-CM

## 2025-03-11 DIAGNOSIS — K62.5 HEMORRHAGE OF ANUS AND RECTUM: ICD-10-CM

## 2025-03-11 PROCEDURE — 99214 OFFICE O/P EST MOD 30 MIN: CPT

## 2025-03-11 PROCEDURE — G2211 COMPLEX E/M VISIT ADD ON: CPT | Mod: NC

## 2025-03-12 LAB
ALBUMIN SERPL ELPH-MCNC: 4.4 G/DL
ALP BLD-CCNC: 48 U/L
ALT SERPL-CCNC: 9 U/L
ANION GAP SERPL CALC-SCNC: 9 MMOL/L
AST SERPL-CCNC: 13 U/L
BILIRUB SERPL-MCNC: 0.3 MG/DL
BUN SERPL-MCNC: 15 MG/DL
CALCIUM SERPL-MCNC: 9.6 MG/DL
CHLORIDE SERPL-SCNC: 101 MMOL/L
CO2 SERPL-SCNC: 31 MMOL/L
CREAT SERPL-MCNC: 1.06 MG/DL
CRP SERPL-MCNC: <3 MG/L
EGFRCR SERPLBLD CKD-EPI 2021: 97 ML/MIN/1.73M2
ERYTHROCYTE [SEDIMENTATION RATE] IN BLOOD BY WESTERGREN METHOD: 2 MM/HR
GLUCOSE SERPL-MCNC: 75 MG/DL
HCT VFR BLD CALC: 46.7 %
HGB BLD-MCNC: 15.2 G/DL
MCHC RBC-ENTMCNC: 32.5 G/DL
MCHC RBC-ENTMCNC: 32.5 PG
MCV RBC AUTO: 99.8 FL
PLATELET # BLD AUTO: 328 K/UL
POTASSIUM SERPL-SCNC: 4.4 MMOL/L
PROT SERPL-MCNC: 6.8 G/DL
RBC # BLD: 4.68 M/UL
RBC # FLD: 12.3 %
SODIUM SERPL-SCNC: 142 MMOL/L
WBC # FLD AUTO: 12.31 K/UL

## 2025-03-19 ENCOUNTER — APPOINTMENT (OUTPATIENT)
Dept: GASTROENTEROLOGY | Facility: AMBULATORY MEDICAL SERVICES | Age: 30
End: 2025-03-19

## 2025-03-25 ENCOUNTER — APPOINTMENT (OUTPATIENT)
Dept: RHEUMATOLOGY | Facility: CLINIC | Age: 30
End: 2025-03-25
Payer: COMMERCIAL

## 2025-03-25 VITALS
BODY MASS INDEX: 20.56 KG/M2 | HEIGHT: 67 IN | WEIGHT: 131 LBS | OXYGEN SATURATION: 96 % | HEART RATE: 80 BPM | SYSTOLIC BLOOD PRESSURE: 120 MMHG | DIASTOLIC BLOOD PRESSURE: 78 MMHG

## 2025-03-25 PROBLEM — M25.50 ARTHRALGIA, UNSPECIFIED JOINT: Status: ACTIVE | Noted: 2025-03-25

## 2025-03-25 PROCEDURE — 36415 COLL VENOUS BLD VENIPUNCTURE: CPT

## 2025-03-25 PROCEDURE — 99215 OFFICE O/P EST HI 40 MIN: CPT

## 2025-03-25 PROCEDURE — G2211 COMPLEX E/M VISIT ADD ON: CPT | Mod: NC

## 2025-04-02 LAB
25(OH)D3 SERPL-MCNC: 34.7 NG/ML
ANA SER IF-ACNC: NEGATIVE
ANCA AB SER-IMP: ABNORMAL
APPEARANCE: CLEAR
BASOPHILS # BLD AUTO: 0.06 K/UL
BASOPHILS NFR BLD AUTO: 0.6 %
BILIRUBIN URINE: NEGATIVE
BLOOD URINE: NEGATIVE
C-ANCA SER-ACNC: NEGATIVE
C3 SERPL-MCNC: 93 MG/DL
C4 SERPL-MCNC: 26 MG/DL
CCP AB SER IA-ACNC: <8 U/ML
COLOR: YELLOW
CREAT SPEC-SCNC: 13 MG/DL
CREAT/PROT UR: NORMAL RATIO
CRP SERPL-MCNC: <3 MG/L
DSDNA AB SER-ACNC: <1 IU/ML
ENA RNP AB SER IA-ACNC: 0.2 AL
ENA SM AB SER IA-ACNC: <0.2 AL
ENA SS-A AB SER IA-ACNC: <0.2 AL
ENA SS-B AB SER IA-ACNC: <0.2 AL
EOSINOPHIL # BLD AUTO: 0.52 K/UL
EOSINOPHIL NFR BLD AUTO: 5.1 %
ERYTHROCYTE [SEDIMENTATION RATE] IN BLOOD BY WESTERGREN METHOD: 2 MM/HR
GLUCOSE QUALITATIVE U: NEGATIVE MG/DL
HBV CORE IGG+IGM SER QL: NONREACTIVE
HCT VFR BLD CALC: 46.8 %
HGB BLD-MCNC: 15.6 G/DL
IMM GRANULOCYTES NFR BLD AUTO: 0.3 %
KETONES URINE: NEGATIVE MG/DL
LEUKOCYTE ESTERASE URINE: NEGATIVE
LYMPHOCYTES # BLD AUTO: 1.42 K/UL
LYMPHOCYTES NFR BLD AUTO: 13.9 %
MAN DIFF?: NORMAL
MCHC RBC-ENTMCNC: 32.8 PG
MCHC RBC-ENTMCNC: 33.3 G/DL
MCV RBC AUTO: 98.5 FL
MONOCYTES # BLD AUTO: 0.78 K/UL
MONOCYTES NFR BLD AUTO: 7.6 %
NEUTROPHILS # BLD AUTO: 7.43 K/UL
NEUTROPHILS NFR BLD AUTO: 72.5 %
NITRITE URINE: NEGATIVE
P-ANCA TITR SER IF: NEGATIVE
PH URINE: 7
PLATELET # BLD AUTO: 345 K/UL
PROT UR-MCNC: <4 MG/DL
PROTEIN URINE: NEGATIVE MG/DL
RBC # BLD: 4.75 M/UL
RBC # FLD: 12.5 %
RF+CCP IGG SER-IMP: NEGATIVE
RHEUMATOID FACT SER QL: <10 IU/ML
SPECIFIC GRAVITY URINE: 1
UROBILINOGEN URINE: 0.2 MG/DL
WBC # FLD AUTO: 10.24 K/UL

## 2025-04-03 ENCOUNTER — APPOINTMENT (OUTPATIENT)
Dept: RHEUMATOLOGY | Facility: CLINIC | Age: 30
End: 2025-04-03
Payer: COMMERCIAL

## 2025-04-03 ENCOUNTER — NON-APPOINTMENT (OUTPATIENT)
Age: 30
End: 2025-04-03

## 2025-04-03 DIAGNOSIS — M25.50 PAIN IN UNSPECIFIED JOINT: ICD-10-CM

## 2025-04-03 DIAGNOSIS — K50.811 CROHN'S DISEASE OF BOTH SMALL AND LARGE INTESTINE WITH RECTAL BLEEDING: ICD-10-CM

## 2025-04-03 PROCEDURE — 99214 OFFICE O/P EST MOD 30 MIN: CPT | Mod: 95

## 2025-04-03 PROCEDURE — G2211 COMPLEX E/M VISIT ADD ON: CPT | Mod: NC,95

## 2025-08-04 ENCOUNTER — NON-APPOINTMENT (OUTPATIENT)
Age: 30
End: 2025-08-04